# Patient Record
Sex: FEMALE | Race: WHITE | NOT HISPANIC OR LATINO | Employment: FULL TIME | URBAN - METROPOLITAN AREA
[De-identification: names, ages, dates, MRNs, and addresses within clinical notes are randomized per-mention and may not be internally consistent; named-entity substitution may affect disease eponyms.]

---

## 2018-03-08 ENCOUNTER — TRANSCRIBE ORDERS (OUTPATIENT)
Dept: GASTROENTEROLOGY | Facility: CLINIC | Age: 58
End: 2018-03-08

## 2018-03-14 ENCOUNTER — TELEPHONE (OUTPATIENT)
Dept: GASTROENTEROLOGY | Facility: CLINIC | Age: 58
End: 2018-03-14

## 2018-03-15 ENCOUNTER — TELEPHONE (OUTPATIENT)
Dept: GASTROENTEROLOGY | Facility: CLINIC | Age: 58
End: 2018-03-15

## 2020-12-10 LAB — HCV AB SER-ACNC: NEGATIVE

## 2021-05-11 ENCOUNTER — APPOINTMENT (OUTPATIENT)
Dept: URGENT CARE | Facility: MEDICAL CENTER | Age: 61
End: 2021-05-11

## 2021-05-11 ENCOUNTER — TRANSCRIBE ORDERS (OUTPATIENT)
Dept: URGENT CARE | Facility: MEDICAL CENTER | Age: 61
End: 2021-05-11

## 2021-05-11 DIAGNOSIS — Z02.1 PHYSICAL EXAM, PRE-EMPLOYMENT: Primary | ICD-10-CM

## 2021-05-11 PROCEDURE — 86480 TB TEST CELL IMMUN MEASURE: CPT | Performed by: PHYSICIAN ASSISTANT

## 2021-05-13 LAB
GAMMA INTERFERON BACKGROUND BLD IA-ACNC: 0.03 IU/ML
M TB IFN-G BLD-IMP: NEGATIVE
M TB IFN-G CD4+ BCKGRND COR BLD-ACNC: 0 IU/ML
M TB IFN-G CD4+ BCKGRND COR BLD-ACNC: 0 IU/ML
MITOGEN IGNF BCKGRD COR BLD-ACNC: >10 IU/ML

## 2021-06-18 ENCOUNTER — APPOINTMENT (OUTPATIENT)
Dept: LAB | Facility: HOSPITAL | Age: 61
End: 2021-06-18

## 2021-06-18 DIAGNOSIS — Z01.84 IMMUNITY STATUS TESTING: ICD-10-CM

## 2021-06-18 PROCEDURE — 86787 VARICELLA-ZOSTER ANTIBODY: CPT

## 2021-06-18 PROCEDURE — 36415 COLL VENOUS BLD VENIPUNCTURE: CPT

## 2021-06-22 LAB — VZV IGG SER IA-ACNC: NORMAL

## 2021-11-10 ENCOUNTER — IMMUNIZATIONS (OUTPATIENT)
Dept: FAMILY MEDICINE CLINIC | Facility: HOSPITAL | Age: 61
End: 2021-11-10

## 2021-11-10 DIAGNOSIS — Z23 ENCOUNTER FOR IMMUNIZATION: Primary | ICD-10-CM

## 2021-11-10 PROCEDURE — 0013A COVID-19 MODERNA VACC 0.25 ML BOOSTER: CPT

## 2021-11-10 PROCEDURE — 91306 COVID-19 MODERNA VACC 0.25 ML BOOSTER: CPT

## 2022-03-09 ENCOUNTER — EVALUATION (OUTPATIENT)
Dept: PHYSICAL THERAPY | Facility: CLINIC | Age: 62
End: 2022-03-09
Payer: COMMERCIAL

## 2022-03-09 DIAGNOSIS — M54.12 CERVICAL RADICULOPATHY: Primary | ICD-10-CM

## 2022-03-09 PROCEDURE — 97162 PT EVAL MOD COMPLEX 30 MIN: CPT | Performed by: PHYSICAL THERAPIST

## 2022-03-09 PROCEDURE — 97110 THERAPEUTIC EXERCISES: CPT | Performed by: PHYSICAL THERAPIST

## 2022-03-09 NOTE — PROGRESS NOTES
PT Evaluation     Today's date: 3/9/2022  Patient name: Roverto Medrano  : 1960  MRN: [id number]  Referring provider: No ref  provider found  Dx:   Encounter Diagnosis     ICD-10-CM    1  Cervical radiculopathy  M54 12                   Assessment  Assessment details: Patient presents with signs and symptoms consistent with cervical radiculopathy  A Love cervical mechanical exam was performed  Shoulder pain and weaknes was resolved with repeated retraction/extension  Positive prognostic indicators include: Motivated to improve, directional preference found (retrction/extension)  Negative prognostic indicators include: acute on chronic presentation  She presents with: pain, decreased: ROM, strength and  functional capacity  She requires skilled PT to address these deficits and restore maximal functional capacity  Thank you for this pleasant referral        Impairments: abnormal or restricted ROM, activity intolerance, impaired physical strength, lacks appropriate home exercise program and pain with function  Understanding of Dx/Px/POC: good   Prognosis: good    Goals  ST-6 weeks  1  Patient to be independent with HEP  2   Decrease pain at least 2 subjective levels  LT-12 weeks  1    Patient to voice comfort with self management of condition  2   75% or > decreased pain  3   75% or > decreased functional deficits  4   Normalize AROM of all deficit planes  5   Normalize strength  7   Patient to voice understanding of activities/positions to avoid  8   Centralize symptoms      Plan  Patient would benefit from: skilled PT  Referral necessary: No  Planned modality interventions: cryotherapy  Planned therapy interventions: IADL retraining, joint mobilization, manual therapy, motor coordination training, neuromuscular re-education, patient education, postural training, self care, strengthening, stretching, therapeutic activities, therapeutic exercise, home exercise program, flexibility, ADL training, balance and body mechanics training  Frequency: 2x week  Duration in weeks: 6  Treatment plan discussed with: patient        Subjective Evaluation    History of Present Illness  Onset date: Many years, new episode since February  Mechanism of injury: Chief Complaint: R shoulder/upper arm pain    History:  Patient has an episodic history of shoulder/upper arm pain that has been worse when she attempted to return to swimming in February  Symptoms have been improved since onset  She had x-rays of shoulder  Treatment this far has consisted of meds  Currently she is not swimming  She works as a Director of Nursing  PMH: neck/back pain, HTN,      Aggravating factors: Sleeping, reaching (car radio, grab phone), donning sports bra    Relieving factors:   Ice/rest, sometimes head, ibuprofin, tennis ball massage    Functional Deficits: Comforter on bed, reaching, swimming    Patient Goals: 20 min swim race in May, swim for fitness    Quality of life: good    Pain  At best pain ratin  At worst pain ratin  Location: R upper arm          Objective     Postural Observations  Seated posture: poor  Correction of posture: has no consistent effect      Mechanical Assessment    Cervical    Seated retraction: repeated movements   Pain intensity: better  Pain level: decreased  Seated Extension: repeated movements  Pain intensity: better  Pain level: abolished    Thoracic      Lumbar      General Comments:      Cervical/Thoracic Comments  Shoulder AROM WFL  Shoulder MMT 4-/5, painful flexion (resolved post cx interventions- retraction/retratctoin pt OP, retraction with extension)  (-) TTP   (-) Impingement  Shoulder pain resolved with retraction/retraction pt OP/retraction-extension seated      Flowsheet Rows      Most Recent Value   PT/OT G-Codes    Current Score 61   Projected Score 73             Precautions: (-)      Manuals             Retraction Mob             Retraction with Ext with Distraction                                       Neuro Re-Ed             Posture Ed             "Neck Acct"             Slouch overcorrect                                                                 Ther Ex             HEP 10'            Seated Ret             Seated Ret pt OP             Seated Ret Ext             Supine Ret/Ext             Scap Squeezes                                       Ther Activity                                       Gait Training                                       Modalities

## 2022-03-15 ENCOUNTER — OFFICE VISIT (OUTPATIENT)
Dept: PHYSICAL THERAPY | Facility: CLINIC | Age: 62
End: 2022-03-15
Payer: COMMERCIAL

## 2022-03-15 DIAGNOSIS — M54.12 CERVICAL RADICULOPATHY: Primary | ICD-10-CM

## 2022-03-15 PROCEDURE — 97110 THERAPEUTIC EXERCISES: CPT | Performed by: PHYSICAL THERAPIST

## 2022-03-15 PROCEDURE — 97112 NEUROMUSCULAR REEDUCATION: CPT | Performed by: PHYSICAL THERAPIST

## 2022-03-15 NOTE — PROGRESS NOTES
Daily Note     Today's date: 3/15/2022  Patient name: Arsen Mcconnell  : 1960  MRN: [id number]  Referring provider: John Jonas MD  Dx:   Encounter Diagnosis     ICD-10-CM    1  Cervical radiculopathy  M54 12                   Subjective: Significant pain Saturday, unsure why  Generally a bit less pain, has been compliant with HEP  Objective: See treatment diary below  Min pain at rest, painful resisted shoulder flexion pre tx  Post tx: pain free at rest and painless/strong shl flexion MMT  Pt ed in prevention/reductive exercises 1 set every 2 hours (minimum)  Voiced understanding      Assessment: Tolerated treatment well  Patient had resolved posterior derangement  Plan: Continue per plan of care        Precautions: (-)      Manuals  3/15           Retraction Mob             Retraction with Ext with Distraction                                       Neuro Re-Ed             Posture Ed  C           "Neck Acct"  C           Slouch overcorrect             Posture Lx Roll  C                                                  Ther Ex             HEP 10'            Seated Ret  20           Seated Ret pt OP  20           Seated Ret Ext  20           Supine Ret/Ext             Scap Squeezes             Seated Ret with Ext with pt OP (towel return)  20                        Ther Activity                                       Gait Training                                       Modalities

## 2022-03-17 ENCOUNTER — OFFICE VISIT (OUTPATIENT)
Dept: PHYSICAL THERAPY | Facility: CLINIC | Age: 62
End: 2022-03-17
Payer: COMMERCIAL

## 2022-03-17 DIAGNOSIS — M54.12 CERVICAL RADICULOPATHY: Primary | ICD-10-CM

## 2022-03-17 PROCEDURE — 97110 THERAPEUTIC EXERCISES: CPT | Performed by: PHYSICAL THERAPIST

## 2022-03-17 PROCEDURE — 97140 MANUAL THERAPY 1/> REGIONS: CPT | Performed by: PHYSICAL THERAPIST

## 2022-03-17 NOTE — PROGRESS NOTES
Daily Note     Today's date: 3/17/2022  Patient name: Berta Whiteside  : 1960  MRN: [id number]  Referring provider: Checo Hightower MD  Dx:   Encounter Diagnosis     ICD-10-CM    1  Cervical radiculopathy  M54 12                   Subjective: Felt good 1 5 days since LV but awoke today with significant pain, feels "something pinching" in shoudler      Objective: See treatment diary below  Baselines: Painful abduction/flexoin AROM and MMT R Shoulder flexion       Assessment: Tolerated treatment well  Patient would benefit from continued PT  Pain and baseline values virtually resolved post tx      Plan: Continue per plan of care        Precautions: (-)      Manuals  3/15 3/17          Retraction Mob             Retraction with Ext with Distraction   10'                                    Neuro Re-Ed             Posture Ed  C           "Neck Acct"  C           Slouch overcorrect             Posture Lx Roll  C                                                  Ther Ex             HEP 10'            Seated Ret  20 20          Seated Ret pt OP  20 20          Seated Ret Ext  20 20          Supine Ret/Ext   20          Scap Squeezes             Seated Ret with Ext with pt OP (towel return)  20 20                       Ther Activity                                       Gait Training                                       Modalities

## 2022-03-18 ENCOUNTER — APPOINTMENT (OUTPATIENT)
Dept: PHYSICAL THERAPY | Facility: CLINIC | Age: 62
End: 2022-03-18
Payer: COMMERCIAL

## 2022-03-22 ENCOUNTER — OFFICE VISIT (OUTPATIENT)
Dept: PHYSICAL THERAPY | Facility: CLINIC | Age: 62
End: 2022-03-22
Payer: COMMERCIAL

## 2022-03-22 DIAGNOSIS — M54.12 CERVICAL RADICULOPATHY: Primary | ICD-10-CM

## 2022-03-22 PROCEDURE — 97140 MANUAL THERAPY 1/> REGIONS: CPT | Performed by: PHYSICAL THERAPIST

## 2022-03-22 PROCEDURE — 97110 THERAPEUTIC EXERCISES: CPT | Performed by: PHYSICAL THERAPIST

## 2022-03-22 NOTE — PROGRESS NOTES
Daily Note     Today's date: 3/22/2022  Patient name: Kayleigh Goodwin  : 1960  MRN: [id number]  Referring provider: Fabio Velázquez MD  Dx:   Encounter Diagnosis     ICD-10-CM    1  Cervical radiculopathy  M54 12                   Subjective: Pt having more shoulder pain and LBP through the weekend  Exercises help but do not fully resolve pain with HEP      Objective: See treatment diary below  Baseline: Pain with active abduction, supine "scooting" with R UE   Pt ed in HEP frequency and supine retraction/extension vs just seated and doing exercises until pain resolves  Assessment: Tolerated treatment well  Patient had fully resolved sympotms post tx  Req'd aggressive/repeated end range extension procedures to clear       Plan: Continue per plan of care     IE for LBP NV     Precautions: (-)      Manuals  3/15 3/17 3/22         Retraction Mob             Retraction with Ext with Distraction   10' 5         Prone Lower cx PA mobs    Gr 2-4 5'                      Neuro Re-Ed             Posture Ed  C  C         "Neck Acct"  C           Slouch overcorrect             Posture Lx Roll  C                                                  Ther Ex             HEP 10'            Seated Ret  20 20 20         Seated Ret pt OP  20 20 20         Seated Ret Ext  20 20 20         Supine Ret/Ext   20 20         Scap Squeezes             Seated Ret with Ext with pt OP (towel return)  20 20 29                      Ther Activity                                       Gait Training                                       Modalities

## 2022-03-24 ENCOUNTER — OFFICE VISIT (OUTPATIENT)
Dept: PHYSICAL THERAPY | Facility: CLINIC | Age: 62
End: 2022-03-24
Payer: COMMERCIAL

## 2022-03-24 DIAGNOSIS — M54.16 LUMBAR RADICULOPATHY: Primary | ICD-10-CM

## 2022-03-24 PROCEDURE — 97110 THERAPEUTIC EXERCISES: CPT | Performed by: PHYSICAL THERAPIST

## 2022-03-24 PROCEDURE — 97161 PT EVAL LOW COMPLEX 20 MIN: CPT | Performed by: PHYSICAL THERAPIST

## 2022-03-24 NOTE — PROGRESS NOTES
PT Evaluation     Today's date: 3/24/2022  Patient name: Peace Parker  : 1960  MRN: [id number]  Referring provider: Mike Mcleod MD  Dx:   Encounter Diagnosis     ICD-10-CM    1  Lumbar radiculopathy  M54 16                   Assessment  Assessment details: Patient presents with signs and symptoms consistent with mechanical lumbar pain/radiculopathy  A Love cervical mechanical exam was performed with symptoms consistent with a posterior derangement with a treatment principle of lumbar extension  Symptoms were resolved with repeated lumbar extension and extension mobilizations  She would benefit from PT including lumbar spine as well as continuing treatment for her shoulder/neck  Thank you for this pleasant referral        Impairments: abnormal or restricted ROM, activity intolerance, impaired physical strength, lacks appropriate home exercise program and pain with function  Understanding of Dx/Px/POC: good   Prognosis: good    Goals  ST-6 weeks  1  Patient to be independent with HEP  2   Decrease pain at least 2 subjective levels  LT-12 weeks  1    Patient to voice comfort with self management of condition  2   75% or > decreased pain  3   75% or > decreased functional deficits  4   Normalize AROM of all deficit planes  5   Normalize strength  7   Patient to voice understanding of activities/positions to avoid  8   Centralize symptoms      Plan  Patient would benefit from: skilled PT  Referral necessary: No  Planned modality interventions: cryotherapy  Planned therapy interventions: IADL retraining, joint mobilization, manual therapy, motor coordination training, neuromuscular re-education, patient education, postural training, self care, strengthening, stretching, therapeutic activities, therapeutic exercise, home exercise program, flexibility, ADL training, balance and body mechanics training  Frequency: 2x week  Duration in weeks: 6  Treatment plan discussed with: patient        Subjective Evaluation    History of Present Illness  Onset date: a few months ago  Mechanism of injury: Chief Complaint: back, R buttock, R LE paresthesias    History:  Patient has a history of LBP and R LE pain for many years including having a L5 Laminectomy a few years ago   She notes increased pain with starting her new job with a long car ride  Symptoms are worse with prolonged standing and rising after sitting and asa the day progresses     Symptoms are eased with stretching (cobra)       PMH: neck/back pain, HTN,       Functional Deficits: Disturbed sleeping, swimming, prolonged sitting    Patient Goals: be able to hike and walk a few miles    Quality of life: good    Pain  At best pain ratin  At worst pain ratin  Location: Back R buttock          Objective     Postural Observations  Seated posture: poor  Correction of posture: makes symptoms better      Mechanical Assessment    Cervical    Seated retraction: repeated movements   Pain intensity: better  Pain level: decreased  Seated Extension: repeated movements  Pain intensity: better  Pain level: abolished    Thoracic      Lumbar      General Comments:      Lumbar Comments  Extension: Min [place]  Remainder AROM WFL  Neuromotor Exam WFL  Holding 15 pound box increased pain    Mechanical Assessment: ADAN/ADAN- abolished pain, Better    Cervical/Thoracic Comments  CERVICAL MEASURES NOT RETESTED TODAY      Shoulder AROM WFL  Shoulder MMT 4-/5, painful flexion (resolved post cx interventions- retraction/retratctoin pt OP, retraction with extension)  (-) TTP   (-) Impingement  Shoulder pain resolved with retraction/retraction pt OP/retraction-extension seated             Precautions: (-)  Manuals  3/15 3/17 3/22 3/24        Retraction Mob             Retraction with Ext with Distraction   10' 5         Prone Lower cx PA mobs    Gr 2-4 5' C                     Neuro Re-Ed             Posture Ed  C  C         "Neck Acct"  C           Slouch overcorrect             Posture Lx Roll  C                                                  Ther Ex             HEP 10'    Lx 10'        Seated Ret  20 20 20         Seated Ret pt OP  20 20 20         Seated Ret Ext  20 20 20         Supine Ret/Ext   20 20         Scap Squeezes             Seated Ret with Ext with pt OP (towel return)  20 20 29                      EIS             EIL             EIS belt OP             EIL pt OP             Ther Activity                                       Gait Training                                       Modalities

## 2022-03-25 ENCOUNTER — APPOINTMENT (OUTPATIENT)
Dept: PHYSICAL THERAPY | Facility: CLINIC | Age: 62
End: 2022-03-25
Payer: COMMERCIAL

## 2022-03-29 ENCOUNTER — OFFICE VISIT (OUTPATIENT)
Dept: PHYSICAL THERAPY | Facility: CLINIC | Age: 62
End: 2022-03-29
Payer: COMMERCIAL

## 2022-03-29 DIAGNOSIS — M54.16 LUMBAR RADICULOPATHY: Primary | ICD-10-CM

## 2022-03-29 DIAGNOSIS — M54.12 CERVICAL RADICULOPATHY: ICD-10-CM

## 2022-03-29 PROCEDURE — 97110 THERAPEUTIC EXERCISES: CPT | Performed by: PHYSICAL THERAPIST

## 2022-03-29 PROCEDURE — 97140 MANUAL THERAPY 1/> REGIONS: CPT | Performed by: PHYSICAL THERAPIST

## 2022-03-29 NOTE — PROGRESS NOTES
Daily Note     Today's date: 3/29/2022  Patient name: Meredith Brian  : 1960  MRN: [id number]  Referring provider: Maite Winn MD  Dx:   Encounter Diagnosis     ICD-10-CM    1  Lumbar radiculopathy  M54 16    2  Cervical radiculopathy  M54 12                   Subjective: Using lumbar roll and initiated use of cx roll with sleeping which made significant relief      Objective: See treatment diary below      Assessment: Tolerated treatment well  Patient continues to repond to extension based interventions for neck/back    Maximally motivated/compliant with HEP      Plan: assess reponse to swimmign if patient able to try before NV     Precautions: (-)  Manuals  3/15 3/17 3/22 3/24 3/29       Retraction Mob             Retraction with Ext with Distraction   10' 5  5       Prone Lower cx PA mobs    Gr 2-4 5' C 5                    Neuro Re-Ed             Posture Ed  C  C         "Neck Acct"  C           Slouch overcorrect             Posture Lx Roll  C                                                  Ther Ex             HEP 10'    Lx 10'        Seated Ret  20 20 20  20       Seated Ret pt OP  20 20 20  20       Seated Ret Ext  20 20 20  20       Supine Ret/Ext   20 20  20       Scap Squeezes             Seated Ret with Ext with pt OP (towel return)  20 20 29                      EIS      20       EIL      20       EIS belt OP      20       EIL pt OP             Ther Activity                                       Gait Training                                       Modalities

## 2022-03-31 ENCOUNTER — APPOINTMENT (OUTPATIENT)
Dept: PHYSICAL THERAPY | Facility: CLINIC | Age: 62
End: 2022-03-31
Payer: COMMERCIAL

## 2022-04-01 ENCOUNTER — APPOINTMENT (OUTPATIENT)
Dept: PHYSICAL THERAPY | Facility: CLINIC | Age: 62
End: 2022-04-01
Payer: COMMERCIAL

## 2022-04-05 ENCOUNTER — OFFICE VISIT (OUTPATIENT)
Dept: PHYSICAL THERAPY | Facility: CLINIC | Age: 62
End: 2022-04-05
Payer: COMMERCIAL

## 2022-04-05 DIAGNOSIS — M54.16 LUMBAR RADICULOPATHY: ICD-10-CM

## 2022-04-05 DIAGNOSIS — M54.12 CERVICAL RADICULOPATHY: Primary | ICD-10-CM

## 2022-04-05 PROCEDURE — 97110 THERAPEUTIC EXERCISES: CPT | Performed by: PHYSICAL THERAPIST

## 2022-04-05 PROCEDURE — 97140 MANUAL THERAPY 1/> REGIONS: CPT | Performed by: PHYSICAL THERAPIST

## 2022-04-05 NOTE — PROGRESS NOTES
Daily Note     Today's date: 2022  Patient name: Radha Lala  : 1960  MRN: [id number]  Referring provider: Claire Crespo MD  Dx:   Encounter Diagnosis     ICD-10-CM    1  Cervical radiculopathy  M54 12    2  Lumbar radiculopathy  M54 16                   Subjective: continues to have some LBP and R Ut/Shoulder pain  Was able to swim without increase in pain  Compliant with HEP  Going to [place] for the weekend  Objective: See treatment diary below  Reviewed preventative exercises and "back account" especially given prolonged driving this weekend   Per pt request shown strengthening exercises as noted for UEs      Assessment: Tolerated treatment well  Patient would benefit from continued PT      Plan: Continue per plan of care        Precautions: (-)  Manuals  3/15 3/17 3/22 3/24 3/29 4      Retraction Mob             Retraction with Ext with Distraction   10' 5  5 5      Prone Lower cx PA mobs    Gr 2-4 5' C 5 5                   Neuro Re-Ed             Posture Ed  C  C         "Neck Acct"  C           Slouch overcorrect             Posture Lx Roll  C                                                  Ther Ex             HEP 10'    Lx 10'        Seated Ret  20 20 20  20 20      Seated Ret pt OP  20 20 20  20 20      Seated Ret Ext  20 20 20  20 20      Supine Ret/Ext   20 20  20 20      Scap Squeezes             Seated Ret with Ext with pt OP (towel return)  20 20 29   20      TB Rows       G :03 20      TB Ext       G :03 20                   EIS      20 20      EIL      20       EIS belt OP      20 20      EIL pt OP             Ther Activity                                       Gait Training                                       Modalities

## 2022-04-08 ENCOUNTER — APPOINTMENT (OUTPATIENT)
Dept: PHYSICAL THERAPY | Facility: CLINIC | Age: 62
End: 2022-04-08
Payer: COMMERCIAL

## 2022-04-12 ENCOUNTER — EVALUATION (OUTPATIENT)
Dept: PHYSICAL THERAPY | Facility: CLINIC | Age: 62
End: 2022-04-12
Payer: COMMERCIAL

## 2022-04-12 DIAGNOSIS — M54.12 CERVICAL RADICULOPATHY: ICD-10-CM

## 2022-04-12 DIAGNOSIS — M54.16 LUMBAR RADICULOPATHY: Primary | ICD-10-CM

## 2022-04-12 PROCEDURE — 97140 MANUAL THERAPY 1/> REGIONS: CPT | Performed by: PHYSICAL THERAPIST

## 2022-04-12 PROCEDURE — 97110 THERAPEUTIC EXERCISES: CPT | Performed by: PHYSICAL THERAPIST

## 2022-04-12 NOTE — PROGRESS NOTES
PT ReEvaluation and Discharge    Today's date: 2022  Patient name: Hossein Garg  : 1960  MRN: [id number]  Referring provider: Rosa M Staton MD  Dx:   Encounter Diagnosis     ICD-10-CM    1  Lumbar radiculopathy  M54 16    2  Cervical radiculopathy  M54 12                   Assessment  Assessment details: Patient has some remaining pain but is compliant with HEP and feels ready to self DC  She will return to therapy only as needed  Thank you for this pleasant referral     Impairments: activity intolerance and pain with function  Understanding of Dx/Px/POC: good   Prognosis: good    Goals  ST-6 weeks  1  Patient to be independent with HEP - Met  2  Decrease pain at least 2 subjective levels  - Met    LT-12 weeks  1    Patient to voice comfort with self management of condition - Met  2   75% or > decreased pain  - Met  3   75% or > decreased functional deficits  - Met  4  Normalize AROM of all deficit planes  - Met  5  Normalize strength  - Met  7  Patient to voice understanding of activities/positions to avoid  - Met  8  Centralize symptoms - Met    Plan  Patient would benefit from: skilled PT  Referral necessary: No  Planned modality interventions: cryotherapy  Planned therapy interventions: IADL retraining, joint mobilization, manual therapy, motor coordination training, neuromuscular re-education, patient education, postural training, self care, strengthening, stretching, therapeutic activities, therapeutic exercise, home exercise program, flexibility, ADL training, balance and body mechanics training  Treatment plan discussed with: patient        Subjective Evaluation    History of Present Illness  Onset date: Many years, new episode since February  Mechanism of injury: Patient feels 90% improvement or better since beginning therapy   She has returned to swimming  She still has pain but is independent and feels ready for DC to HEP        Patient Goals: 20 min swim race in May, swim for fitness    Quality of life: good    Pain  At best pain ratin  Location: R upper arm          Objective     Postural Observations  Seated posture: poor  Correction of posture: has no consistent effect      Mechanical Assessment    Cervical    Seated retraction: repeated movements   Pain intensity: better  Pain level: abolished  Seated Extension: repeated movements  Pain intensity: better  Pain level: abolished    Thoracic      Lumbar      General Comments:      Cervical/Thoracic Comments  Shoulder AROM WFL  Shoulder MMT 4-/5, painful flexion (resolved post cx interventions- retraction/retratctoin pt OP, retraction with extension)  (-) TTP   (-) Impingement  Shoulder pain resolved with retraction/retraction pt OP/retraction-extension seated    Lumbar AROM WFL- symptoms resolve with repeated lumbar extension      Flowsheet Rows      Most Recent Value   PT/OT G-Codes    Current Score 67   Projected Score 60             Precautions: (-)         Manuals  3/15 3/17 3/22 3/24 3/29 4/5 4/12     Retraction Mob             Retraction with Ext with Distraction   10' 5  5 5 10     Prone Lower cx PA mobs    Gr 2-4 5' C 5 5 5                  Neuro Re-Ed             Posture Ed  C  C         "Neck Acct"  C           Slouch overcorrect             Posture Lx Roll  C                                                  Ther Ex             HEP 10'    Lx 10'        Seated Ret  20 20 20  20 20 20     Seated Ret pt OP  20 20 20  20 20 20     Seated Ret Ext  20 20 20  20 20 20     Supine Ret/Ext   20 20  20 20 20     Scap Squeezes             Seated Ret with Ext with pt OP (towel return)  20 20 29   20 20     TB Rows       G :03 20      TB Ext       G :03 20                   EIS      20 20      EIL      20       EIS belt OP      20 20      EIL pt OP             Ther Activity                                       Gait Training                                       Modalities

## 2022-04-15 ENCOUNTER — APPOINTMENT (OUTPATIENT)
Dept: PHYSICAL THERAPY | Facility: CLINIC | Age: 62
End: 2022-04-15
Payer: COMMERCIAL

## 2022-06-20 ENCOUNTER — TELEPHONE (OUTPATIENT)
Dept: PAIN MEDICINE | Facility: CLINIC | Age: 62
End: 2022-06-20

## 2022-06-20 ENCOUNTER — TRANSCRIBE ORDERS (OUTPATIENT)
Dept: PAIN MEDICINE | Facility: CLINIC | Age: 62
End: 2022-06-20

## 2022-07-07 ENCOUNTER — TELEPHONE (OUTPATIENT)
Dept: PAIN MEDICINE | Facility: CLINIC | Age: 62
End: 2022-07-07

## 2022-07-07 NOTE — TELEPHONE ENCOUNTER
Ambulance Spoke with the [facility] and they put the records in the mail to us, unfortunately as of 7/7 we did not receive them  Will have them resend them next week if we do not receive the records  EMS

## 2022-07-27 ENCOUNTER — OFFICE VISIT (OUTPATIENT)
Dept: PAIN MEDICINE | Facility: CLINIC | Age: 62
End: 2022-07-27
Payer: COMMERCIAL

## 2022-07-27 ENCOUNTER — TRANSCRIBE ORDERS (OUTPATIENT)
Dept: PAIN MEDICINE | Facility: CLINIC | Age: 62
End: 2022-07-27

## 2022-07-27 VITALS — SYSTOLIC BLOOD PRESSURE: 142 MMHG | WEIGHT: 175 LBS | HEART RATE: 77 BPM | DIASTOLIC BLOOD PRESSURE: 80 MMHG

## 2022-07-27 DIAGNOSIS — M79.18 MYOFASCIAL PAIN SYNDROME: ICD-10-CM

## 2022-07-27 DIAGNOSIS — M50.120 CERVICAL DISC DISORDER WITH RADICULOPATHY OF MID-CERVICAL REGION: ICD-10-CM

## 2022-07-27 DIAGNOSIS — M47.812 CERVICAL SPONDYLOSIS: ICD-10-CM

## 2022-07-27 DIAGNOSIS — M48.062 LUMBAR STENOSIS WITH NEUROGENIC CLAUDICATION: Primary | ICD-10-CM

## 2022-07-27 DIAGNOSIS — M47.816 LUMBAR SPONDYLOSIS: ICD-10-CM

## 2022-07-27 PROCEDURE — 99244 OFF/OP CNSLTJ NEW/EST MOD 40: CPT | Performed by: ANESTHESIOLOGY

## 2022-07-27 RX ORDER — ATORVASTATIN CALCIUM 10 MG/1
10 TABLET, FILM COATED ORAL
COMMUNITY
Start: 2022-02-28 | End: 2022-09-09 | Stop reason: SDUPTHER

## 2022-07-27 RX ORDER — LOSARTAN POTASSIUM 50 MG/1
50 TABLET ORAL DAILY
COMMUNITY
Start: 2021-10-28 | End: 2022-09-13 | Stop reason: SDUPTHER

## 2022-07-27 RX ORDER — TIZANIDINE 4 MG/1
4 TABLET ORAL
Qty: 30 TABLET | Refills: 1 | Status: SHIPPED | OUTPATIENT
Start: 2022-07-27 | End: 2022-08-04

## 2022-07-27 RX ORDER — TRAZODONE HYDROCHLORIDE 100 MG/1
50 TABLET ORAL
COMMUNITY
Start: 2022-05-12 | End: 2022-09-13 | Stop reason: SDUPTHER

## 2022-07-27 NOTE — PROGRESS NOTES
Assessment  1  Lumbar stenosis with neurogenic claudication    2  Lumbar spondylosis    3  Myofascial pain syndrome    4  Cervical disc disorder with radiculopathy of mid-cervical region    5  Cervical spondylosis        Plan  The patient's symptoms, history/physical are consistent with pain that is multifactorial in origin but predominantly result of her spinal stenosis at L4-5 which is causing her chronic lower back and leg symptoms  At this time, I will order an EMG of the bilateral lower extremities to assess for any active versus chronic radiculopathy  I have also asked her to obtain the MRI CD of the images for me to review  She also has underlying cervical spondylosis which is causing her chronic neck pain as well as significant spasm which causes her chronic discomfort so I will start her on tizanidine 4 mg at bedtime  She was apprised of the most common side effects including sleepiness and dizziness  My impressions and treatment recommendations were discussed in detail with the patient who verbalized understanding and had no further questions  Discharge instructions were provided  I personally saw and examined the patient and I agree with the above discussed plan of care  Orders Placed This Encounter   Procedures    EMG 2 limb lower extremity     Standing Status:   Future     Standing Expiration Date:   7/27/2023     Scheduling Instructions:      Pain and numbness into both legs     Order Specific Question:   Possible Diagnosis: Answer:   lumbar radiculopathy     New Medications Ordered This Visit   Medications    atorvastatin (LIPITOR) 10 mg tablet     Sig: Take 10 mg by mouth daily    losartan (COZAAR) 50 mg tablet     Sig: Take 50 mg by mouth daily    traZODone (DESYREL) 100 mg tablet     Sig: Take 100 mg by mouth if needed Patient states that she takes half a pill as needed at bedtime      tiZANidine (ZANAFLEX) 4 mg tablet     Sig: Take 1 tablet (4 mg total) by mouth daily at bedtime     Dispense:  30 tablet     Refill:  1       History of Present Illness    Rahat Anguiano is a 57 y o  female referred by Dr Janis Orta for chronic lower back and neck pain  Symptoms have been present for 10 years  Pain is moderate at times rated 6/10 on numeric rating scale and felt intermittently worse at night described to be dull aching cramping and burning  Pain starts in the lower back and travels into both legs  She also experiences pain in the neck that travels into the shoulders  Symptoms are aggravated sitting, walking  Treatment history has included prior epidurals which have been moderately helpful/1 was in June 2021  Prior back surgery in 2011 provided excellent relief  Chiropractic manipulation provides moderate relief  She uses ibuprofen as needed which is helpful  Diazepam in the past has been helpful for neck spasms  I have personally reviewed and/or updated the patient's past medical history, past surgical history, family history, social history, current medications, allergies, and vital signs today  Review of Systems   Constitutional: Negative for fever and unexpected weight change  HENT: Negative for trouble swallowing  Eyes: Negative for visual disturbance  Respiratory: Negative for shortness of breath and wheezing  Cardiovascular: Negative for chest pain and palpitations  Gastrointestinal: Negative for constipation, diarrhea, nausea and vomiting  Endocrine: Negative for cold intolerance, heat intolerance and polydipsia  Genitourinary: Negative for difficulty urinating and frequency  Musculoskeletal: Positive for back pain, gait problem and joint swelling  Negative for arthralgias and myalgias  Skin: Negative for rash  Neurological: Negative for dizziness, seizures, syncope, weakness and headaches  Hematological: Does not bruise/bleed easily  Psychiatric/Behavioral: Negative for dysphoric mood     All other systems reviewed and are negative  There is no problem list on file for this patient  Past Medical History:   Diagnosis Date    High cholesterol     Hypertension        History reviewed  No pertinent surgical history  History reviewed  No pertinent family history  Social History     Occupational History    Not on file   Tobacco Use    Smoking status: Not on file    Smokeless tobacco: Not on file   Substance and Sexual Activity    Alcohol use: Not on file    Drug use: Not on file    Sexual activity: Not on file       Current Outpatient Medications on File Prior to Visit   Medication Sig    atorvastatin (LIPITOR) 10 mg tablet Take 10 mg by mouth daily    losartan (COZAAR) 50 mg tablet Take 50 mg by mouth daily    traZODone (DESYREL) 100 mg tablet Take 100 mg by mouth if needed Patient states that she takes half a pill as needed at bedtime  No current facility-administered medications on file prior to visit  Not on File    Physical Exam    /80   Pulse 77   Wt 79 4 kg (175 lb)     Constitutional: normal, well developed, well nourished, alert, in no distress and non-toxic and no overt pain behavior  Eyes: anicteric  HEENT: grossly intact  Neck: supple, symmetric, trachea midline and no masses   Pulmonary:even and unlabored  Cardiovascular:No edema or pitting edema present  Skin:Normal without rashes or lesions and well hydrated  Psychiatric:Mood and affect appropriate  Neurologic:Cranial Nerves II-XII grossly intact  Musculoskeletal:Examination of the cervical and lumbar spine reveals full strength 5/5 in the bilateral upper extremity and lower extremity flexors/extensors with 2+ reflexes in the bilateral biceps, triceps, patellar, Achilles; full range of motion    Imaging    MRI Lumbar Spine (5/20/2021)  History: Lower back pain radiating to both legs without lumbar stenosis  Technique: Noncontrast MRI of the lumbar spine  Sagittal T1 and STIR, sagittal   and axial T2       Comparison: MRI of 6/27/2018  Findings: For the purposes of this dictation, the most inferior disc will be   designated L5-S1  Conus and lower thoracic cord: Both normal  Conus medullaris located at L1  Marrow and Alignment: No marrow replacing process  Alteration of the marrow   signal intensity at the L4-5 and L5-S1 endplates secondary to degenerative   change, present on the prior MRI  Normal alignment  L1-L2: Minimal annular bulge  3 mm perineural cyst in the left neural foramen  No change  L2-L3 : Minimal annular bulge  No change  L3-L4: Minimal loss of disc height  Annular bulging  No change  L4-L5: Further loss of disc height since the prior study  Resolution of the   previous right-sided disc protrusion  Diffuse annular bulge  Increased   hypertrophy of the ligamenta flava  Degenerated facet joints  Moderate to severe   central canal stenosis, unchanged  Increase in the size of the left   foraminal/extraforaminal disc protrusion  Mass effect upon the exiting left L4   nerve root, not present on the prior MRI  Moderate to severe left foraminal   stenosis, increased  L5-S1: Status post left hemilaminectomy  Patent central canal  No arachnoiditis  Annular bulge  Mild increase in the size of the small central disc protrusion   which indents the anterior surface of the thecal sac  Hypertrophy of the right   ligamentum flavum  Small osteophytes  Moderate to severe right and mild left   neural foraminal stenosis, unchanged  Paraspinal soft tissues: Unremarkable  MRI Cervical Spine (10/29/2021)  History: Cervicalgia     Procedure: MRI of the cervical spine without contrast dated 10/29/2021     Technique: MRI of the cervical spine was performed without contrast     Contrast: None     Comparison: MRI cervical spine dated 8/13/2015     Findings:     Alignment: There is normal cervical lordosis  The vertebral bodies are normal in   height and alignment       Bone Marrow: Multilevel degenerative endplate changes  No focal suspicious   marrow signal abnormality  CSF/Spinal Cord: The spinal cord is normal in caliber and signal      Visualized Posterior Fossa/Foramen Magnum: The cerebellar tonsils are normal in   location  Moderate disc space narrowing at C5-C6, progressed  Mild disc space narrowing at   C6-C7, progressed  Mild multilevel anterior endplate osteophyte formation  Levels:   C2-3: Broad, shallow central disc protrusion, greatly decreased in size, no   longer contacting the ventral cord  No spinal canal or foraminal stenosis  C3-4: Tiny central disc protrusion, not significantly changed  No spinal canal   or foraminal stenosis  C4-5: Broad left paracentral/foraminal disc protrusion, not significantly   changed  Left uncovertebral hypertrophy  Stable mild effacement of the left   lateral recess  No right foraminal stenosis  Severe left foraminal stenosis,   stable  C5-6: Mild disc bulge and posterior osteophytic ridging with left foraminal   extension  Bilateral uncovertebral hypertrophy, left more than right  Stable   effacement of the left lateral recess  Moderate right and severe left foraminal   stenosis, not significant changed  C6-7: Mild disc bulge and posterior osteophytic ridging  Bilateral uncovertebral   hypertrophy  No spinal canal stenosis  Severe right and moderate left foraminal   stenosis, not significant changed     C7-T1: Normal     Visualized Upper Thoracic Spine: Normal   Paraspinal Soft Tissues: Unremarkable

## 2022-07-27 NOTE — PATIENT INSTRUCTIONS
Lumbar Spinal Stenosis   WHAT YOU NEED TO KNOW:   What is lumbar spinal stenosis? Lumbar spinal stenosis is narrowing of the spinal canal in your lower back  Your spinal canal is the opening in your spine that contains your spinal cord  When your spinal canal narrows, it may put pressure on your spinal cord and nerves  What causes lumbar spinal stenosis? Narrowing of your spinal canal may be caused by changes that happen as you age  These changes include bone spurs (growths), herniated discs, and thickened ligaments  Bone growths can be caused by osteoarthritis  A herniated disc bulges out between the vertebrae (bones) and into your spinal canal  Discs are spongy cushions between the vertebrae in your spine  Herniated discs may be caused by activities that increase stress on the spine  An example is heavy lifting  Ligaments that connect the vertebrae may thicken and harden as you get older  Other conditions, such as spinal injuries and Paget's disease, can also cause spinal stenosis  What are the signs and symptoms of lumbar spinal stenosis? Signs and symptoms may start or get worse when you stand or walk  They are often relieved when you sit or lean forward  Low back pain    Pain, numbness, tingling, or weakness in one or both legs    Pain in your buttocks that extends to your thighs or legs    How is lumbar spinal stenosis diagnosed? Your healthcare provider will ask about your symptoms and when they started  He or she will ask if you have any medical conditions  Your provider may ask you to lift, bend, walk, sit, or reach  An x-ray, MRI or a CT may show problems in your spine that are causing spinal stenosis  You may be given contrast liquid to help the spine show up better in the pictures  Tell the healthcare provider if you have ever had an allergic reaction to contrast liquid  Do not enter the MRI room with anything metal  Metal can cause serious injury   Tell the healthcare provider if you have any metal in or on your body  How is lumbar spinal stenosis treated? NSAIDs , such as ibuprofen, help decrease swelling, pain, and fever  NSAIDs can cause stomach bleeding or kidney problems in certain people  If you take blood thinner medicine, always ask your healthcare provider if NSAIDs are safe for you  Always read the medicine label and follow directions  Acetaminophen  decreases pain and fever  It is available without a doctor's order  Ask how much to take and how often to take it  Follow directions  Read the labels of all other medicines you are using to see if they also contain acetaminophen, or ask your doctor or pharmacist  Acetaminophen can cause liver damage if not taken correctly  Do not use more than 4 grams (4,000 milligrams) total of acetaminophen in one day  Prescription pain medicine  may be given  Ask how to take this medicine safely  Muscle relaxers  help decrease pain and muscle spasms  A steroid injection  may be given to reduce inflammation  Steroid medicine is injected into the epidural space  The epidural space is between your spinal cord and vertebrae  A nerve block  is an injection of numbing medicine  You may need a nerve block if your pain is not going away, or is getting worse  Surgery  may be needed to widen your spinal canal or decrease pressure on your spinal cord  Surgery may also be done to fix damaged or injured vertebrae in your back  How can I manage my symptoms? Go to physical and occupational therapy  as directed  A physical therapist teaches you exercises to help improve movement and strength, and to decrease pain  An occupational therapist teaches you skills to help with your daily activities  Rest  when you feel it is needed  Slowly start to do more each day  Return to your daily activities as directed  Apply heat on your back for 20 to 30 minutes every 2 hours for as many days as directed  Heat helps decrease pain and muscle spasms  Apply ice  on your back for 15 to 20 minutes every hour or as directed  Use an ice pack, or put crushed ice in a plastic bag  Cover it with a towel before you apply it to your skin  Ice helps prevent tissue damage and decreases swelling and pain  When should I seek immediate care? You have pain in your leg that does not go away or gets worse  You have trouble moving your legs  You cannot control when you urinate or have a bowel movement  When should I contact my healthcare provider? You have new or worsening symptoms  Your symptoms keep you from doing your daily activities  You have questions or concerns about your condition or care  CARE AGREEMENT:   You have the right to help plan your care  Learn about your health condition and how it may be treated  Discuss treatment options with your healthcare providers to decide what care you want to receive  You always have the right to refuse treatment  The above information is an  only  It is not intended as medical advice for individual conditions or treatments  Talk to your doctor, nurse or pharmacist before following any medical regimen to see if it is safe and effective for you  © Copyright [employer] 2022 Information is for End User's use only and may not be sold, redistributed or otherwise used for commercial purposes   All illustrations and images included in CareNotes® are the copyrighted property of [employer]  or [address] [place] [address] [place]

## 2022-07-29 ENCOUNTER — TELEPHONE (OUTPATIENT)
Dept: ADMINISTRATIVE | Facility: OTHER | Age: 62
End: 2022-07-29

## 2022-07-29 PROBLEM — G47.09 OTHER INSOMNIA: Status: ACTIVE | Noted: 2022-07-29

## 2022-07-29 PROBLEM — M85.89 OSTEOPENIA OF MULTIPLE SITES: Status: ACTIVE | Noted: 2019-07-24

## 2022-07-29 PROBLEM — M47.812 CERVICAL SPONDYLOSIS WITHOUT MYELOPATHY: Status: ACTIVE | Noted: 2018-11-01

## 2022-07-29 PROBLEM — E78.2 MIXED HYPERLIPIDEMIA: Status: ACTIVE | Noted: 2018-07-11

## 2022-07-29 PROBLEM — I10 HYPERTENSION, ESSENTIAL: Status: ACTIVE | Noted: 2019-10-16

## 2022-07-29 PROBLEM — M48.062 SPINAL STENOSIS OF LUMBAR REGION WITH NEUROGENIC CLAUDICATION: Status: ACTIVE | Noted: 2021-10-22

## 2022-07-29 PROBLEM — Z86.010 HISTORY OF COLON POLYPS: Status: ACTIVE | Noted: 2022-07-29

## 2022-07-29 PROBLEM — F41.9 ANXIETY: Status: ACTIVE | Noted: 2022-07-29

## 2022-07-29 PROBLEM — Z86.0100 HISTORY OF COLON POLYPS: Status: ACTIVE | Noted: 2022-07-29

## 2022-07-29 PROBLEM — M54.12 CERVICAL RADICULOPATHY: Status: ACTIVE | Noted: 2018-11-01

## 2022-07-29 PROBLEM — E55.9 VITAMIN D DEFICIENCY: Status: ACTIVE | Noted: 2020-12-08

## 2022-07-29 NOTE — PROGRESS NOTES
Assessment/Plan:  Problem List Items Addressed This Visit        Respiratory    Non-seasonal allergic rhinitis     Management per ENT  Stable on Flonase  Cardiovascular and Mediastinum    Hypertension, essential     Stable  Check blood pressure outside of office  Recommend lifestyle modifications  Relevant Orders    CBC and differential    Comprehensive metabolic panel    Ambulatory Referral to Weight Management       Nervous and Auditory    Cervical radiculopathy     Management per Pain Management  Musculoskeletal and Integument    Osteopenia of multiple sites     Next Dexa due 9/8/22  No Rx previously  Continue Calcium and Vitamin D 1000IU daily  Relevant Orders    DXA bone density spine hip and pelvis    Ambulatory Referral to Nutrition Services    Vitamin D 25 hydroxy    Ambulatory Referral to Weight Management    Cervical spondylosis without myelopathy     Management per Pain Management  Other    Vitamin D deficiency     Pending labs  Continue MVI, Calcium, and Vitamin D 1000 daily  Relevant Orders    Vitamin D 25 hydroxy    Mixed hyperlipidemia     Pending labs  Continue Lipitor 10mg QHS  Recommend lifestyle modifications  Relevant Orders    Ambulatory Referral to Nutrition Services    CBC and differential    Comprehensive metabolic panel    Lipid panel    TSH, 3rd generation with Free T4 reflex    LDL cholesterol, direct    Ambulatory Referral to Weight Management    Anxiety     Stable s Rx or counseling  Relevant Orders    TSH, 3rd generation with Free T4 reflex    Other insomnia     Stable on Trazodone 50mg QHS PRN  Overweight     Stable  Recommend lifestyle modifications  Relevant Orders    Ambulatory Referral to Nutrition Services    Ambulatory Referral to Weight Management    Major depressive disorder with single episode, in remission ([facility] [address] )     Stable s Rx or counseling            Spinal stenosis of lumbar region with neurogenic claudication     Management per Pain Management  History of colon polyps     Colonoscopy is up-to-date  Other Visit Diagnoses     Annual physical exam    -  Primary    Screening for diabetes mellitus        Relevant Orders    Hemoglobin A1C    Screening for cardiovascular condition        Relevant Orders    CBC and differential    Comprehensive metabolic panel    Lipid panel    LDL cholesterol, direct    BMI 28 0-28 9,adult        Relevant Orders    Ambulatory Referral to Nutrition Services    Ambulatory Referral to Weight Management    Screening for HIV (human immunodeficiency virus)        Relevant Orders    HIV 1/2 Antigen/Antibody (4th Generation) w Reflex [facility]           Return in about 6 weeks (around 9/12/2022) for F/U HTN, HL, Anx/Dep, Insomnia, Vit D Def, Labs  Future Appointments   Date Time Provider Zachery Mahoney   9/9/2022  8:00 AM Matthew Barbosa DO [facility]   9/13/2022 10:40 AM Rissa January, DO [facility]   10/17/2022  1:30 PM Jamarcus Whyte MD [facility]        Subjective:     Ashly Corona is a 57 y o  female who presents today as a new patient for her medical conditions  New Patient    Previous PCP:  Dr Aislinn Valle at [facility]  Reason for Transfer:  Insurance   Last seen by previous PCP:  5/12/22  Last Labs:  10/28/21  Last Physical:  Unknown  Medical Records Requested:  No      HPI:  Chief Complaint   Patient presents with    Physical Exam     Hypertension  Weight   New position     Medication Refill     Not at this time   HM     Did not get second booster  HIV screening okay      -- Above per clinical staff and reviewed  --      HPI      Today:      Controlled Substance Review    [place] PDMP or [place]  reviewed: No red flags were identified; safe to proceed with prescription  Monia Le       Overweight - Trying to watch diet, but would like to cut back on salt  Previosly had success c keto diet  +Exercise - Swimming for 30 minutes, 3 days per week; Yoga for 1 hour, 1 day per week; Walking for 30 minutes, 3-4 days per week; Hiking for 2 hours, 2 times per month; Elliptical if not swimming; Weightlifting for 30 minutes, 1 day per week  HTN - On Losartan 50mg QD  D/C Ramipril due to ACE-I cough  No higher dose or other HTN Rx previously  Checking BP on arm cuff outside of office 130/86  Hyperlipidemia - On Lipitor 10mg QHS  No higher dose or alternate statin previously  Anxiety / Depression - Stable  Good social supports  No SI/HI/AH/VH  Previously on Celexa (D/C due anorgasmia, weight gain), Ativan in   No counseling previously  Insomnia - On Trazodone 100mg 1/2 tab QHS PRN - using 1-2 times per week  Without Rx, has difficulty falling asleep - sometimes takes 2 hours  With Rx, can fall asleep within 20 minutes  Sleeps 8 5 hours c or s Rx  No higher dose or alternate sleep Rx previously  PHQ-2/9 Depression Screening    Little interest or pleasure in doing things: 0 - not at all  Feeling down, depressed, or hopeless: 0 - not at all  Trouble falling or staying asleep, or sleeping too much: 0 - not at all  Feeling tired or having little energy: 1 - several days  Poor appetite or overeatin - not at all  Feeling bad about yourself - or that you are a failure or have let yourself or your family down: 0 - not at all  Trouble concentrating on things, such as reading the newspaper or watching television: 0 - not at all  Moving or speaking so slowly that other people could have noticed   Or the opposite - being so fidgety or restless that you have been moving around a lot more than usual: 0 - not at all  Thoughts that you would be better off dead, or of hurting yourself in some way: 0 - not at all  PHQ-2 Score: 0  PHQ-2 Interpretation: Negative depression screen  PHQ-9 Score: 1   PHQ-9 Interpretation: No or Minimal depression FANNY-7 Flowsheet Screening    Flowsheet Row Most Recent Value   Over the last 2 weeks, how often have you been bothered by any of the following problems? Feeling nervous, anxious, or on edge 0   Not being able to stop or control worrying 0   Worrying too much about different things 0   Trouble relaxing 1   Being so restless that it is hard to sit still 0   Becoming easily annoyed or irritable 0   Feeling afraid as if something awful might happen 0   FANNY-7 Total Score 1        MDQ:  1, Asynchronous, No Problem    Vitamin D Deficiency - s/p Maddie  Taking MVI, Calcium 1200mg daily, and OTC Vitamin D  Osteopenia - Next Dexa due 9/8/22  No Rx previously  Lumbar Stenosis c Neurogenic Claudication L4-L5 / Lumbar Spondylosis / Myofascial Pain Syndrome / Cervical Disc Disorder with Radiculopathy of Mid-Cervical Region / Cervical Spondylosis - Management per Pain Management per Dr Celester Hammans  Next appt PRN? Pending EMG B/L LE with Neuro Dr Gavino Delgado appointment 10/22  Rx Tizanidine 4mg QHS, but not taking as not needed yet  Chronic Sinus Problems / AR - Management per ENT Dr Gabriella Erickson  Next appt PRN  Stable on Flonase  H/O Colon Polyps - Next colonoscopy due 2/26/23  Reviewed:  Labs 10/28/21, Pain Management 7/27/22, Gyn 8/31/21    Sees Gyn - Pending appt c Dr Don Ernst at [facility] 9/22  Previously seeing [facility] and [facility]  Sees Dentist q6 months  Sees Optho q2 years  The following portions of the patient's history were reviewed and updated as appropriate: allergies, current medications, past family history, past medical history, past social history, past surgical history and problem list       Review of Systems   Constitutional: Positive for fatigue (Intermittent)  Negative for appetite change, chills, diaphoresis and fever  Respiratory: Negative for chest tightness and shortness of breath      Cardiovascular: Negative for chest pain  Gastrointestinal: Negative for abdominal pain, blood in stool, diarrhea, nausea and vomiting  Genitourinary: Negative for dysuria  Current Outpatient Medications   Medication Sig Dispense Refill    atorvastatin (LIPITOR) 10 mg tablet Take 10 mg by mouth daily at bedtime      b complex vitamins capsule Take 1 capsule by mouth daily      calcium carbonate (OS-DAWNA) 600 MG tablet Take 1,200 mg by mouth daily      cholecalciferol (VITAMIN D3) 1,000 units tablet Take 1,000 Units by mouth daily      fluticasone (FLONASE) 50 mcg/act nasal spray 2 sprays into each nostril daily OTC      losartan (COZAAR) 50 mg tablet Take 50 mg by mouth daily      Multiple Vitamin (multivitamin) tablet Take 1 tablet by mouth daily      Omega-3 Fatty Acids (fish oil) 1,000 mg Take 1,000 mg by mouth daily      Probiotic Product (PROBIOTIC-10 PO) Take 1 tablet by mouth in the morning      traZODone (DESYREL) 100 mg tablet Take 50 mg by mouth daily at bedtime as needed for sleep      zinc gluconate 50 mg tablet Take 50 mg by mouth daily      tiZANidine (ZANAFLEX) 4 mg tablet Take 1 tablet (4 mg total) by mouth daily at bedtime (Patient not taking: Reported on 8/1/2022) 30 tablet 1     No current facility-administered medications for this visit  Objective:  /78   Pulse 75   Temp (!) 97 3 °F (36 3 °C)   Resp 14   Ht 5' 6" (1 676 m)   Wt 80 9 kg (178 lb 6 4 oz)   LMP 01/01/2015 (Within Years)   SpO2 100%   Breastfeeding No   BMI 28 79 kg/m²    Wt Readings from Last 3 Encounters:   08/01/22 80 9 kg (178 lb 6 4 oz)   07/27/22 79 4 kg (175 lb)      BP Readings from Last 3 Encounters:   08/01/22 128/78   07/27/22 142/80          Physical Exam  Vitals and nursing note reviewed  Constitutional:       Appearance: Normal appearance  She is well-developed  HENT:      Head: Normocephalic and atraumatic        Right Ear: Tympanic membrane, ear canal and external ear normal       Left Ear: Tympanic membrane, ear canal and external ear normal       Nose: Nose normal       Right Sinus: No maxillary sinus tenderness or frontal sinus tenderness  Left Sinus: No maxillary sinus tenderness or frontal sinus tenderness  Mouth/Throat:      Mouth: Mucous membranes are moist       Pharynx: Oropharynx is clear  Uvula midline  Tonsils: No tonsillar exudate  Eyes:      Extraocular Movements: Extraocular movements intact  Conjunctiva/sclera: Conjunctivae normal       Pupils: Pupils are equal, round, and reactive to light  Cardiovascular:      Rate and Rhythm: Normal rate and regular rhythm  Pulses: Normal pulses  Heart sounds: Normal heart sounds  Pulmonary:      Effort: Pulmonary effort is normal       Breath sounds: Normal breath sounds  Abdominal:      General: Bowel sounds are normal  There is no distension  Palpations: Abdomen is soft  There is no mass  Tenderness: There is no abdominal tenderness  There is no guarding or rebound  Musculoskeletal:         General: No swelling or tenderness  Cervical back: Neck supple  Right lower leg: No edema  Left lower leg: No edema  Lymphadenopathy:      Cervical: No cervical adenopathy  Skin:     Findings: No rash  Neurological:      General: No focal deficit present  Mental Status: She is alert and oriented to person, place, and time  Psychiatric:         Mood and Affect: Mood normal          Behavior: Behavior normal          Thought Content: Thought content normal          Judgment: Judgment normal          Lab Results:      Lab Results   Component Value Date    HGBA1C 5 4 10/28/2021     No results found for: URICACID  Invalid input(s): BASENAME Vitamin D    No results found  POCT Labs      BMI Counseling: Body mass index is 28 79 kg/m²  The BMI is above normal  Nutrition recommendations include encouraging healthy choices of fruits and vegetables   Exercise recommendations include exercising 3-5 times per week  No pharmacotherapy was ordered  Rationale for BMI follow-up plan is due to patient being overweight or obese  Depression Screening and Follow-up Plan: Patient was screened for depression during today's encounter  They screened negative with a PHQ-2 score of 0

## 2022-07-29 NOTE — TELEPHONE ENCOUNTER
Upon review of the In Basket request we were able to locate, review, and update the patient chart as requested for CRC: Colonoscopy, DEXA Scan, Mammogram and Pap Smear (HPV) aka Cervical Cancer Screening  Any additional questions or concerns should be emailed to the Practice Liaisons via [email or web address] email, please do not reply via In Basket      Thank you  Salma Alvarez

## 2022-08-01 ENCOUNTER — OFFICE VISIT (OUTPATIENT)
Dept: FAMILY MEDICINE CLINIC | Facility: CLINIC | Age: 62
End: 2022-08-01
Payer: COMMERCIAL

## 2022-08-01 VITALS
HEIGHT: 66 IN | WEIGHT: 178.4 LBS | SYSTOLIC BLOOD PRESSURE: 128 MMHG | OXYGEN SATURATION: 100 % | HEART RATE: 75 BPM | TEMPERATURE: 97.3 F | BODY MASS INDEX: 28.67 KG/M2 | RESPIRATION RATE: 14 BRPM | DIASTOLIC BLOOD PRESSURE: 78 MMHG

## 2022-08-01 DIAGNOSIS — F41.9 ANXIETY: ICD-10-CM

## 2022-08-01 DIAGNOSIS — M85.89 OSTEOPENIA OF MULTIPLE SITES: ICD-10-CM

## 2022-08-01 DIAGNOSIS — F32.5 MAJOR DEPRESSIVE DISORDER WITH SINGLE EPISODE, IN REMISSION (HCC): ICD-10-CM

## 2022-08-01 DIAGNOSIS — Z13.6 SCREENING FOR CARDIOVASCULAR CONDITION: ICD-10-CM

## 2022-08-01 DIAGNOSIS — Z86.010 HISTORY OF COLON POLYPS: ICD-10-CM

## 2022-08-01 DIAGNOSIS — Z13.1 SCREENING FOR DIABETES MELLITUS: ICD-10-CM

## 2022-08-01 DIAGNOSIS — M54.12 CERVICAL RADICULOPATHY: ICD-10-CM

## 2022-08-01 DIAGNOSIS — Z11.4 SCREENING FOR HIV (HUMAN IMMUNODEFICIENCY VIRUS): ICD-10-CM

## 2022-08-01 DIAGNOSIS — M48.062 SPINAL STENOSIS OF LUMBAR REGION WITH NEUROGENIC CLAUDICATION: ICD-10-CM

## 2022-08-01 DIAGNOSIS — J30.89 NON-SEASONAL ALLERGIC RHINITIS, UNSPECIFIED TRIGGER: ICD-10-CM

## 2022-08-01 DIAGNOSIS — Z00.00 ANNUAL PHYSICAL EXAM: Primary | ICD-10-CM

## 2022-08-01 DIAGNOSIS — M47.812 CERVICAL SPONDYLOSIS WITHOUT MYELOPATHY: ICD-10-CM

## 2022-08-01 DIAGNOSIS — E55.9 VITAMIN D DEFICIENCY: ICD-10-CM

## 2022-08-01 DIAGNOSIS — E66.3 OVERWEIGHT: ICD-10-CM

## 2022-08-01 DIAGNOSIS — G47.09 OTHER INSOMNIA: ICD-10-CM

## 2022-08-01 DIAGNOSIS — I10 HYPERTENSION, ESSENTIAL: ICD-10-CM

## 2022-08-01 DIAGNOSIS — E78.2 MIXED HYPERLIPIDEMIA: ICD-10-CM

## 2022-08-01 PROCEDURE — 99396 PREV VISIT EST AGE 40-64: CPT | Performed by: FAMILY MEDICINE

## 2022-08-01 PROCEDURE — 3725F SCREEN DEPRESSION PERFORMED: CPT | Performed by: FAMILY MEDICINE

## 2022-08-01 PROCEDURE — 99203 OFFICE O/P NEW LOW 30 MIN: CPT | Performed by: FAMILY MEDICINE

## 2022-08-01 RX ORDER — MELATONIN
1000 DAILY
COMMUNITY

## 2022-08-01 RX ORDER — VITAMIN B COMPLEX
1 CAPSULE ORAL DAILY
COMMUNITY

## 2022-08-01 RX ORDER — CHLORAL HYDRATE 500 MG
1000 CAPSULE ORAL DAILY
COMMUNITY

## 2022-08-01 RX ORDER — MULTIVITAMIN
1 TABLET ORAL DAILY
COMMUNITY

## 2022-08-01 RX ORDER — FLUTICASONE PROPIONATE 50 MCG
2 SPRAY, SUSPENSION (ML) NASAL DAILY
COMMUNITY

## 2022-08-01 RX ORDER — PHENOL 1.4 %
1200 AEROSOL, SPRAY (ML) MUCOUS MEMBRANE DAILY
COMMUNITY
End: 2022-09-13

## 2022-08-01 RX ORDER — ZINC GLUCONATE 50 MG
50 TABLET ORAL DAILY
COMMUNITY

## 2022-08-01 NOTE — PATIENT INSTRUCTIONS
Wellness Visit for Adults   AMBULATORY CARE:   A wellness visit  is when you see your healthcare provider to get screened for health problems  Your healthcare provider will also give you advice on how to stay healthy  Write down your questions so you remember to ask them  Ask your healthcare provider how often you should have a wellness visit  What happens at a wellness visit:  Your healthcare provider will ask about your health, and your family history of health problems  This includes high blood pressure, heart disease, and cancer  He or she will ask if you have symptoms that concern you, if you smoke, and about your mood  You may also be asked about your intake of medicines, supplements, food, and alcohol  Any of the following may be done:  · Your weight  will be checked  Your height may also be checked so your body mass index (BMI) can be calculated  Your BMI shows if you are at a healthy weight  · Your blood pressure  and heart rate will be checked  Your temperature may also be checked  · Blood and urine tests  may be done  Blood tests may be done to check your cholesterol levels  Abnormal cholesterol levels increase your risk for heart disease and stroke  You may also need a blood or urine test to check for diabetes if you are at increased risk  Urine tests may be done to look for signs of an infection or kidney disease  · A physical exam  includes checking your heartbeat and lungs with a stethoscope  Your healthcare provider may also check your skin to look for sun damage  · Screening tests  may be recommended  A screening test is done to check for diseases that may not cause symptoms  The screening tests you may need depend on your age, gender, family history, and lifestyle habits  For example, colorectal screening may be recommended if you are 48years old or older  Screening tests you need if you are a woman:   · A Pap smear  is used to screen for cervical cancer   Pap smears are usually done every 3 to 5 years depending on your age  You may need them more often if you have had abnormal Pap smear test results in the past  Ask your healthcare provider how often you should have a Pap smear  · A mammogram  is an x-ray of your breasts to screen for breast cancer  Experts recommend mammograms every 2 years starting at age 48 years  You may need a mammogram at age 52 years or younger if you have an increased risk for breast cancer  Talk to your healthcare provider about when you should start having mammograms and how often you need them  Vaccines you may need:   · Get an influenza vaccine  every year  The influenza vaccine protects you from the flu  Several types of viruses cause the flu  The viruses change over time, so new vaccines are made each year  · Get a tetanus-diphtheria (Td) booster vaccine  every 10 years  This vaccine protects you against tetanus and diphtheria  Tetanus is a severe infection that may cause painful muscle spasms and lockjaw  Diphtheria is a severe bacterial infection that causes a thick covering in the back of your mouth and throat  · Get a human papillomavirus (HPV) vaccine  if you are female and aged 23 to 32 or male 23 to 24 and never received it  This vaccine protects you from HPV infection  HPV is the most common infection spread by sexual contact  HPV may also cause vaginal, penile, and anal cancers  · Get a pneumococcal vaccine  if you are aged 72 years or older  The pneumococcal vaccine is an injection given to protect you from pneumococcal disease  Pneumococcal disease is an infection caused by pneumococcal bacteria  The infection may cause pneumonia, meningitis, or an ear infection  · Get a shingles vaccine  if you are 60 or older, even if you have had shingles before  The shingles vaccine is an injection to protect you from the varicella-zoster virus  This is the same virus that causes chickenpox   Shingles is a painful rash that develops in people who had chickenpox or have been exposed to the virus  How to eat healthy:  My Plate is a model for planning healthy meals  It shows the types and amounts of foods that should go on your plate  Fruits and vegetables make up about half of your plate, and grains and protein make up the other half  A serving of dairy is included on the side of your plate  The amount of calories and serving sizes you need depends on your age, gender, weight, and height  Examples of healthy foods are listed below:  · Eat a variety of vegetables  such as dark green, red, and orange vegetables  You can also include canned vegetables low in sodium (salt) and frozen vegetables without added butter or sauces  · Eat a variety of fresh fruits , canned fruit in 100% juice, frozen fruit, and dried fruit  · Include whole grains  At least half of the grains you eat should be whole grains  Examples include whole-wheat bread, wheat pasta, brown rice, and whole-grain cereals such as oatmeal     · Eat a variety of protein foods such as seafood (fish and shellfish), lean meat, and poultry without skin (turkey and chicken)  Examples of lean meats include pork leg, shoulder, or tenderloin, and beef round, sirloin, tenderloin, and extra lean ground beef  Other protein foods include eggs and egg substitutes, beans, peas, soy products, nuts, and seeds  · Choose low-fat dairy products such as skim or 1% milk or low-fat yogurt, cheese, and cottage cheese  · Limit unhealthy fats  such as butter, hard margarine, and shortening  Exercise:  Exercise at least 30 minutes per day on most days of the week  Some examples of exercise include walking, biking, dancing, and swimming  You can also fit in more physical activity by taking the stairs instead of the elevator or parking farther away from stores  Include muscle strengthening activities 2 days each week  Regular exercise provides many health benefits   It helps you manage your weight, and decreases your risk for type 2 diabetes, heart disease, stroke, and high blood pressure  Exercise can also help improve your mood  Ask your healthcare provider about the best exercise plan for you  General health and safety guidelines:   · Do not smoke  Nicotine and other chemicals in cigarettes and cigars can cause lung damage  Ask your healthcare provider for information if you currently smoke and need help to quit  E-cigarettes or smokeless tobacco still contain nicotine  Talk to your healthcare provider before you use these products  · Limit alcohol  A drink of alcohol is 12 ounces of beer, 5 ounces of wine, or 1½ ounces of liquor  · Lose weight, if needed  Being overweight increases your risk of certain health conditions  These include heart disease, high blood pressure, type 2 diabetes, and certain types of cancer  · Protect your skin  Do not sunbathe or use tanning beds  Use sunscreen with a SPF 15 or higher  Apply sunscreen at least 15 minutes before you go outside  Reapply sunscreen every 2 hours  Wear protective clothing, hats, and sunglasses when you are outside  · Drive safely  Always wear your seatbelt  Make sure everyone in your car wears a seatbelt  A seatbelt can save your life if you are in an accident  Do not use your cell phone when you are driving  This could distract you and cause an accident  Pull over if you need to make a call or send a text message  · Practice safe sex  Use latex condoms if are sexually active and have more than one partner  Your healthcare provider may recommend screening tests for sexually transmitted infections (STIs)  · Wear helmets, lifejackets, and protective gear  Always wear a helmet when you ride a bike or motorcycle, go skiing, or play sports that could cause a head injury  Wear protective equipment when you play sports  Wear a lifejacket when you are on a boat or doing water sports      © Copyright [employer] 2022 Information is for End User's use only and may not be sold, redistributed or otherwise used for commercial purposes  All illustrations and images included in CareNotes® are the copyrighted property of [employer]  or Therese Campos  The above information is an  only  It is not intended as medical advice for individual conditions or treatments  Talk to your doctor, nurse or pharmacist before following any medical regimen to see if it is safe and effective for you  Weight Management   AMBULATORY CARE:   Why it is important to manage your weight:  Being overweight increases your risk of health conditions such as heart disease, high blood pressure, type 2 diabetes, and certain types of cancer  It can also increase your risk for osteoarthritis, sleep apnea, and other respiratory problems  Aim for a slow, steady weight loss  Even a small amount of weight loss can lower your risk of health problems  Risks of being overweight:  Extra weight can cause many health problems, including the following:  · Diabetes (high blood sugar level)    · High blood pressure or high cholesterol    · Heart disease    · Stroke    · Gallbladder or liver disease    · Cancer of the colon, breast, prostate, liver, or kidney    · Sleep apnea    · Arthritis or gout    Screening  is done to check for health conditions before you have signs or symptoms  If you are 28to 79years old, your blood sugar level may be checked every 3 years for signs of prediabetes or diabetes  Your healthcare provider will check your blood pressure at each visit  High blood pressure can lead to a stroke or other problems  Your provider may check for signs of heart disease, cancer, or other health problems  How to lose weight safely:  A safe and healthy way to lose weight is to eat fewer calories and get regular exercise  · You can lose up about 1 pound a week by decreasing the number of calories you eat by 500 calories each day   You can decrease calories by eating smaller portion sizes or by cutting out high-calorie foods  Read labels to find out how many calories are in the foods you eat  · You can also burn calories with exercise such as walking, swimming, or biking  You will be more likely to keep weight off if you make these changes part of your lifestyle  Exercise at least 30 minutes per day on most days of the week  You can also fit in more physical activity by taking the stairs instead of the elevator or parking farther away from stores  Ask your healthcare provider about the best exercise plan for you  Healthy meal plan for weight management:  A healthy meal plan includes a variety of foods, contains fewer calories, and helps you stay healthy  A healthy meal plan includes the following:     · Eat whole-grain foods more often  A healthy meal plan should contain fiber  Fiber is the part of grains, fruits, and vegetables that is not broken down by your body  Whole-grain foods are healthy and provide extra fiber in your diet  Some examples of whole-grain foods are whole-wheat breads and pastas, oatmeal, brown rice, and bulgur  · Eat a variety of vegetables every day  Include dark, leafy greens such as spinach, kale, lesli greens, and mustard greens  Eat yellow and orange vegetables such as carrots, sweet potatoes, and winter squash  · Eat a variety of fruits every day  Choose fresh or canned fruit (canned in its own juice or light syrup) instead of juice  Fruit juice has very little or no fiber  · Eat low-fat dairy foods  Drink fat-free (skim) milk or 1% milk  Eat fat-free yogurt and low-fat cottage cheese  Try low-fat cheeses such as mozzarella and other reduced-fat cheeses  · Choose meat and other protein foods that are low in fat  Choose beans or other legumes such as split peas or lentils  Choose fish, skinless poultry (chicken or turkey), or lean cuts of red meat (beef or pork)   Before you cook meat or poultry, cut off any visible fat  · Use less fat and oil  Try baking foods instead of frying them  Add less fat, such as margarine, sour cream, regular salad dressing and mayonnaise to foods  Eat fewer high-fat foods  Some examples of high-fat foods include french fries, doughnuts, ice cream, and cakes  · Eat fewer sweets  Limit foods and drinks that are high in sugar  This includes candy, cookies, regular soda, and sweetened drinks  Ways to decrease calories:   · Eat smaller portions  ? Use a small plate with smaller servings  ? Do not eat second helpings  ? When you eat at a restaurant, ask for a box and place half of your meal in the box before you eat  ? Share an entrée with someone else  · Replace high-calorie snacks with healthy, low-calorie snacks  ? Choose fresh fruit, vegetables, fat-free rice cakes, or air-popped popcorn instead of potato chips, nuts, or chocolate  ? Choose water or calorie-free drinks instead of soda or sweetened drinks  · Do not shop for groceries when you are hungry  You may be more likely to make unhealthy food choices  Take a grocery list of healthy foods and shop after you have eaten  · Eat regular meals  Do not skip meals  Skipping meals can lead to overeating later in the day  This can make it harder for you to lose weight  Eat a healthy snack in place of a meal if you do not have time to eat a regular meal  Talk with a dietitian to help you create a meal plan and schedule that is right for you  Other things to consider as you try to lose weight:   · Be aware of situations that may give you the urge to overeat, such as eating while watching television  Find ways to avoid these situations  For example, read a book, go for a walk, or do crafts  · Meet with a weight loss support group or friends who are also trying to lose weight  This may help you stay motivated to continue working on your weight loss goals      © Copyright [employer] 2022 Information is for End User's use only and may not be sold, redistributed or otherwise used for commercial purposes  All illustrations and images included in CareNotes® are the copyrighted property of [employer]  or Therese Campos  The above information is an  only  It is not intended as medical advice for individual conditions or treatments  Talk to your doctor, nurse or pharmacist before following any medical regimen to see if it is safe and effective for you  Cholesterol and Your Health   AMBULATORY CARE:   Cholesterol  is a waxy, fat-like substance  Your body uses cholesterol to make hormones and new cells, and to protect nerves  Cholesterol is made by your body  It also comes from certain foods you eat, such as meat and dairy products  Your healthcare provider can help you set goals for your cholesterol levels  He or she can help you create a plan to meet your goals  Cholesterol level goals: Your cholesterol level goals depend on your risk for heart disease, your age, and your other health conditions  The following are general guidelines:  · Total cholesterol  includes low-density lipoprotein (LDL), high-density lipoprotein (HDL), and triglyceride levels  The total cholesterol level should be lower than 200 mg/dL and is best at about 150 mg/dL  · LDL cholesterol  is called bad cholesterol  because it forms plaque in your arteries  As plaque builds up, your arteries become narrow, and less blood flows through  When plaque decreases blood flow to your heart, you may have chest pain  If plaque completely blocks an artery that brings blood to your heart, you may have a heart attack  Plaque can break off and form blood clots  Blood clots may block arteries in your brain and cause a stroke  The level should be less than 130 mg/dL and is best at about 100 mg/dL  · HDL cholesterol  is called good cholesterol  because it helps remove LDL cholesterol from your arteries   It does this by attaching to LDL cholesterol and carrying it to your liver  Your liver breaks down LDL cholesterol so your body can get rid of it  High levels of HDL cholesterol can help prevent a heart attack and stroke  Low levels of HDL cholesterol can increase your risk for heart disease, heart attack, and stroke  The level should be 60 mg/dL or higher  · Triglycerides  are a type of fat that store energy from foods you eat  High levels of triglycerides also cause plaque buildup  This can increase your risk for a heart attack or stroke  If your triglyceride level is high, your LDL cholesterol level may also be high  The level should be less than 150 mg/dL  Any of the following can increase your risk for high cholesterol:   · Smoking cigarettes    · Being overweight or obese, or not getting enough exercise    · Drinking large amounts of alcohol    · A medical condition such as hypertension (high blood pressure) or diabetes    · Certain genes passed from your parents to you    · Age older than 65 years    What you need to know about having your cholesterol levels checked: Adults 21to 39years of age should have their cholesterol levels checked every 4 to 6 years  Adults 45 years or older should have their cholesterol checked every 1 to 2 years  You may need your cholesterol checked more often, or at a younger age, if you have risk factors for heart disease  You may also need to have your cholesterol checked more often if you have other health conditions, such as diabetes  Blood tests are used to check cholesterol levels  Blood tests measure your levels of triglycerides, LDL cholesterol, and HDL cholesterol  How healthy fats affect your cholesterol levels:  Healthy fats, also called unsaturated fats, help lower LDL cholesterol and triglyceride levels  Healthy fats include the following:  · Monounsaturated fats  are found in foods such as olive oil, canola oil, avocado, nuts, and olives      · Polyunsaturated fats,  such as omega 3 fats, are found in fish, such as salmon, trout, and tuna  They can also be found in plant foods such as flaxseed, walnuts, and soybeans  How unhealthy fats affect your cholesterol levels:  Unhealthy fats increase LDL cholesterol and triglyceride levels  They are found in foods high in cholesterol, saturated fat, and trans fat:  · Cholesterol  is found in eggs, dairy, and meat  · Saturated fat  is found in butter, cheese, ice cream, whole milk, and coconut oil  Saturated fat is also found in meat, such as sausage, hot dogs, and bologna  · Trans fat  is found in liquid oils and is used in fried and baked foods  Foods that contain trans fats include chips, crackers, muffins, sweet rolls, microwave popcorn, and cookies  Treatment  for high cholesterol will also decrease your risk of heart disease, heart attack, and stroke  Treatment may include any of the following:  · Lifestyle changes  may include food, exercise, weight loss, and quitting smoking  You may also need to decrease the amount of alcohol you drink  Your healthcare provider will want you to start with lifestyle changes  Other treatment may be added if lifestyle changes are not enough  Your healthcare provider may recommend you work with a team to manage hyperlipidemia  The team may include medical experts such as a dietitian, an exercise or physical therapist, and a behavior therapist  Your family members may be included in helping you create lifestyle changes  · Medicines  may be given to lower your LDL cholesterol, triglyceride levels, or total cholesterol level  You may need medicines to lower your cholesterol if any of the following is true:    ? You have a history of stroke, TIA, unstable angina, or a heart attack  ? Your LDL cholesterol level is 190 mg/dL or higher  ? You are age 36 to 76 years, have diabetes or heart disease risk factors, and your LDL cholesterol is 70 mg/dL or higher      · Supplements  include fish oil, red yeast rice, and garlic  Fish oil may help lower your triglyceride and LDL cholesterol levels  It may also increase your HDL cholesterol level  Red yeast rice may help decrease your total cholesterol level and LDL cholesterol level  Garlic may help lower your total cholesterol level  Do not take any supplements without talking to your healthcare provider  Food changes you can make to lower your cholesterol levels:  A dietitian can help you create a healthy eating plan  He or she can show you how to read food labels and choose foods low in saturated fat, trans fats, and cholesterol  · Decrease the total amount of fat you eat  Choose lean meats, fat-free or 1% fat milk, and low-fat dairy products, such as yogurt and cheese  Try to limit or avoid red meats  Limit or do not eat fried foods or baked goods, such as cookies  · Replace unhealthy fats with healthy fats  Cook foods in olive oil or canola oil  Choose soft margarines that are low in saturated fat and trans fat  Seeds, nuts, and avocados are other examples of healthy fats  · Eat foods with omega-3 fats  Examples include salmon, tuna, mackerel, walnuts, and flaxseed  Eat fish 2 times per week  Pregnant women should not eat fish that have high levels of mercury, such as shark, swordfish, and meggan mackerel  · Increase the amount of high-fiber foods you eat  High-fiber foods can help lower your LDL cholesterol  Aim to get between 20 and 30 grams of fiber each day  Fruits and vegetables are high in fiber  Eat at least 5 servings each day  Other high-fiber foods are whole-grain or whole-wheat breads, pastas, or cereals, and brown rice  Eat 3 ounces of whole-grain foods each day  Increase fiber slowly  You may have abdominal discomfort, bloating, and gas if you add fiber to your diet too quickly  · Eat healthy protein foods  Examples include low-fat dairy products, skinless chicken and turkey, fish, and nuts      · Limit foods and drinks that are high in sugar  Your dietitian or healthcare provider can help you create daily limits for high-sugar foods and drinks  The limit may be lower if you have diabetes or another health condition  Limits can also help you lose weight if needed  Lifestyle changes you can make to lower your cholesterol levels:   · Maintain a healthy weight  Ask your healthcare provider what a healthy weight is for you  Ask him or her to help you create a weight loss plan if needed  Weight loss can decrease your total cholesterol and triglyceride levels  Weight loss may also help keep your blood pressure at a healthy level  · Be physically active throughout the day  Physical activity, such as exercise, can help lower your total cholesterol level and maintain a healthy weight  Physical activity can also help increase your HDL cholesterol level  Work with your healthcare provider to create an program that is right for you  Get at least 30 to 40 minutes of moderate physical activity most days of the week  Examples of exercise include brisk walking, swimming, or biking  Also include strength training at least 2 times each week  Your healthcare providers can help you create a physical activity plan  · Do not smoke  Nicotine and other chemicals in cigarettes and cigars can raise your cholesterol levels  Ask your healthcare provider for information if you currently smoke and need help to quit  E-cigarettes or smokeless tobacco still contain nicotine  Talk to your healthcare provider before you use these products  · Limit or do not drink alcohol  Alcohol can increase your triglyceride levels  Ask your healthcare provider before you drink alcohol  Ask how much is okay for you to drink in 24 hours or 1 week  Follow up with your doctor as directed:  Write down your questions so you remember to ask them during your visits    © Copyright [employer] 2022 Information is for End User's use only and may not be sold, redistributed or otherwise used for commercial purposes  All illustrations and images included in CareNotes® are the copyrighted property of [employer]  or Therese Campos  The above information is an  only  It is not intended as medical advice for individual conditions or treatments  Talk to your doctor, nurse or pharmacist before following any medical regimen to see if it is safe and effective for you

## 2022-08-03 NOTE — PROGRESS NOTES
Assessment/Plan:  Problem List Items Addressed This Visit        Respiratory    Non-seasonal allergic rhinitis     Management per ENT  Stable on Flonase  Cardiovascular and Mediastinum    Hypertension, essential     Stable  Check blood pressure outside of office  Recommend lifestyle modifications  Relevant Orders    CBC and differential    Comprehensive metabolic panel    Ambulatory Referral to Weight Management       Nervous and Auditory    Cervical radiculopathy     Management per Pain Management  Musculoskeletal and Integument    Osteopenia of multiple sites     Next Dexa due 9/8/22  No Rx previously  Continue Calcium and Vitamin D 1000IU daily  Relevant Orders    DXA bone density spine hip and pelvis    Ambulatory Referral to Nutrition Services    Vitamin D 25 hydroxy    Ambulatory Referral to Weight Management    Cervical spondylosis without myelopathy     Management per Pain Management  Other    Vitamin D deficiency     Pending labs  Continue MVI, Calcium, and Vitamin D 1000 daily  Relevant Orders    Vitamin D 25 hydroxy    Mixed hyperlipidemia     Pending labs  Continue Lipitor 10mg QHS  Recommend lifestyle modifications  Relevant Orders    Ambulatory Referral to Nutrition Services    CBC and differential    Comprehensive metabolic panel    Lipid panel    TSH, 3rd generation with Free T4 reflex    LDL cholesterol, direct    Ambulatory Referral to Weight Management    Anxiety     Stable s Rx or counseling  Relevant Orders    TSH, 3rd generation with Free T4 reflex    Other insomnia     Stable on Trazodone 50mg QHS PRN  Overweight     Stable  Recommend lifestyle modifications  Relevant Orders    Ambulatory Referral to Nutrition Services    Ambulatory Referral to Weight Management    Major depressive disorder with single episode, in remission ([facility] [address] )     Stable s Rx or counseling            Spinal stenosis of lumbar region with neurogenic claudication     Management per Pain Management  History of colon polyps     Colonoscopy is up-to-date  Other Visit Diagnoses     Annual physical exam    -  Primary    Screening for diabetes mellitus        Relevant Orders    Hemoglobin A1C    Screening for cardiovascular condition        Relevant Orders    CBC and differential    Comprehensive metabolic panel    Lipid panel    LDL cholesterol, direct    BMI 28 0-28 9,adult        Relevant Orders    Ambulatory Referral to Nutrition Services    Ambulatory Referral to Weight Management    Screening for HIV (human immunodeficiency virus)        Relevant Orders    HIV 1/2 Antigen/Antibody (4th Generation) w Reflex [facility]           Return in about 6 weeks (around 9/12/2022) for F/U HTN, HL, Anx/Dep, Insomnia, Vit D Def, Labs  Future Appointments   Date Time Provider Zachery Mahoney   9/9/2022  8:00 AM Whitney Mcleod DO [facility]   9/13/2022 10:40 AM Saji Tate DO [facility]   10/17/2022  1:30 PM Brian Salazar MD NEURO [facility]        Subjective:     Ronald Naik is a 57 y o  female who presents today for a follow-up on her chronic medical conditions  HPI:  Chief Complaint   Patient presents with    Physical Exam     Hypertension  Weight   New position     Medication Refill     Not at this time   HM     Did not get second booster  HIV screening okay      -- Above per clinical staff and reviewed  --      HPI      Today:      Physical    Trying to watch diet  +Exercise  Sees Gyn - Pending appt c Dr Criselda Gonzalez at [phone] 9/22  Previously seeing [facility] and Jefyr Douglas Ms  Cole Speaks, CRNP  Sees Dentist q6 months  Sees Optho q2 years        Health Maintenance   Topic Date Due    HIV Screening  Never done    Influenza Vaccine (1) 09/01/2022    COVID-19 Vaccine (4 - Booster for Moderna series) 11/01/2022 (Originally 3/10/2022)    Breast Cancer Screening: Mammogram  01/12/2023    Colorectal Cancer Screening  02/26/2023    BMI: Followup Plan  08/01/2023    BMI: Adult  08/01/2023    Annual Physical  08/01/2023    Depression Remission PHQ  08/01/2023    DXA SCAN  09/08/2023    Cervical Cancer Screening  08/04/2025    DTaP,Tdap,and Td Vaccines (2 - Td or Tdap) 12/08/2030    Hepatitis C Screening  Completed    Pneumococcal Vaccine: Pediatrics (0 to 5 Years) and At-Risk Patients (6 to 59 Years)  Aged Out    HIB Vaccine  Aged Out    Hepatitis B Vaccine  Aged Out    IPV Vaccine  Aged Out    Hepatitis A Vaccine  Aged Out    Meningococcal ACWY Vaccine  Aged Out    HPV Vaccine  Aged Out         The following portions of the patient's history were reviewed and updated as appropriate: allergies, current medications, past family history, past medical history, past social history, past surgical history and problem list       Review of Systems     See other note          Current Outpatient Medications   Medication Sig Dispense Refill    atorvastatin (LIPITOR) 10 mg tablet Take 10 mg by mouth daily at bedtime      b complex vitamins capsule Take 1 capsule by mouth daily      calcium carbonate (OS-DAWNA) 600 MG tablet Take 1,200 mg by mouth daily      cholecalciferol (VITAMIN D3) 1,000 units tablet Take 1,000 Units by mouth daily      fluticasone (FLONASE) 50 mcg/act nasal spray 2 sprays into each nostril daily OTC      losartan (COZAAR) 50 mg tablet Take 50 mg by mouth daily      Multiple Vitamin (multivitamin) tablet Take 1 tablet by mouth daily      Omega-3 Fatty Acids (fish oil) 1,000 mg Take 1,000 mg by mouth daily      Probiotic Product (PROBIOTIC-10 PO) Take 1 tablet by mouth in the morning      traZODone (DESYREL) 100 mg tablet Take 50 mg by mouth daily at bedtime as needed for sleep      zinc gluconate 50 mg tablet Take 50 mg by mouth daily      tiZANidine (ZANAFLEX) 4 mg tablet Take 1 tablet (4 mg total) by mouth daily at bedtime (Patient not taking: Reported on 8/1/2022) 30 tablet 1     No current facility-administered medications for this visit  Objective:  /78   Pulse 75   Temp (!) 97 3 °F (36 3 °C)   Resp 14   Ht 5' 6" (1 676 m)   Wt 80 9 kg (178 lb 6 4 oz)   LMP 01/01/2015 (Within Years)   SpO2 100%   Breastfeeding No   BMI 28 79 kg/m²    Wt Readings from Last 3 Encounters:   08/01/22 80 9 kg (178 lb 6 4 oz)   07/27/22 79 4 kg (175 lb)      BP Readings from Last 3 Encounters:   08/01/22 128/78   07/27/22 142/80          Physical Exam     Vitals and nursing note reviewed  Constitutional:       Appearance: Normal appearance  She is well-developed  HENT:      Head: Normocephalic and atraumatic  Right Ear: Tympanic membrane, ear canal and external ear normal       Left Ear: Tympanic membrane, ear canal and external ear normal       Nose: Nose normal       Right Sinus: No maxillary sinus tenderness or frontal sinus tenderness  Left Sinus: No maxillary sinus tenderness or frontal sinus tenderness  Mouth/Throat:      Mouth: Mucous membranes are moist       Pharynx: Oropharynx is clear  Uvula midline  Tonsils: No tonsillar exudate  Eyes:      Extraocular Movements: Extraocular movements intact  Conjunctiva/sclera: Conjunctivae normal       Pupils: Pupils are equal, round, and reactive to light  Cardiovascular:      Rate and Rhythm: Normal rate and regular rhythm  Pulses: Normal pulses  Heart sounds: Normal heart sounds  Pulmonary:      Effort: Pulmonary effort is normal       Breath sounds: Normal breath sounds  Abdominal:      General: Bowel sounds are normal  There is no distension  Palpations: Abdomen is soft  There is no mass  Tenderness: There is no abdominal tenderness  There is no guarding or rebound  Musculoskeletal:         General: No swelling or tenderness  Cervical back: Neck supple  Right lower leg: No edema  Left lower leg: No edema  Lymphadenopathy:      Cervical: No cervical adenopathy  Skin:     Findings: No rash  Neurological:      General: No focal deficit present  Mental Status: She is alert and oriented to person, place, and time  Psychiatric:         Mood and Affect: Mood normal          Behavior: Behavior normal          Thought Content: Thought content normal          Judgment: Judgment normal         Lab Results:      Lab Results   Component Value Date    HGBA1C 5 4 10/28/2021     No results found for: URICACID  Invalid input(s): BASENAME Vitamin D    No results found       POCT Labs

## 2022-08-04 ENCOUNTER — TELEPHONE (OUTPATIENT)
Dept: RADIOLOGY | Facility: CLINIC | Age: 62
End: 2022-08-04

## 2022-08-04 DIAGNOSIS — M79.18 MYOFASCIAL PAIN SYNDROME: Primary | ICD-10-CM

## 2022-08-04 RX ORDER — METHOCARBAMOL 500 MG/1
500 TABLET, FILM COATED ORAL 3 TIMES DAILY PRN
Qty: 90 TABLET | Refills: 0 | Status: SHIPPED | OUTPATIENT
Start: 2022-08-04 | End: 2023-04-24 | Stop reason: SDUPTHER

## 2022-08-04 NOTE — TELEPHONE ENCOUNTER
Pt came in to the office  States she will be leaving for vacation at the end of the day today  She recently had OV with FQ and was given script for tizanidine, pt reports being unable to tolerable s/e (fatigue / tiredness) and is no longer taking the tizanidine  Pt would like to know if she can get another prescription for a different medication prior to her trip  Please advise, thank you

## 2022-08-04 NOTE — TELEPHONE ENCOUNTER
Script for Methocarbamol sent, I tried to see if she was previously on this but couldn't find anything

## 2022-08-04 NOTE — TELEPHONE ENCOUNTER
Attempted to call pt on her cell, voice mailbox is full, unable to leave a message  Attempted to call pt's home #, this number is no longer in service  Pls try and call pt on 8/5/22

## 2022-08-19 ENCOUNTER — TELEPHONE (OUTPATIENT)
Dept: RADIOLOGY | Facility: CLINIC | Age: 62
End: 2022-08-19

## 2022-08-19 DIAGNOSIS — M51.16 INTERVERTEBRAL DISC DISORDER WITH RADICULOPATHY OF LUMBAR REGION: Primary | ICD-10-CM

## 2022-08-19 NOTE — TELEPHONE ENCOUNTER
She is disc bulging and arthritis at L4-5 and L5-S1 where there stenosis worst at L4-5 that is moderate  Suspect that is the etiology of the pain that she experiences    Can proceed with a right L4-5 transforaminal epidural steroid injection to help with symptoms that she would like

## 2022-08-19 NOTE — TELEPHONE ENCOUNTER
Pt said she was seen in our office on 7/27 and FQ asked her to drop off her CD of her MRI and she did the following day  She is scheduled for the EMG in Oct   When she was in the office her pain was intermittent, she then went on vac to [facility] and she couldn't even walk on the beach, after walking 25 yrds she had to stop  She get pain in B/L buttocks that goes into the iliac crest and into the Rt groin  The pain occurs with activities  She has tried ice  She wants to know what the cause of the pain is? Does she need an injection?

## 2022-08-19 NOTE — TELEPHONE ENCOUNTER
Pt informed of the same as stated in previous task  Pt is interested in the injection  Pt denies taking any blood thinners  Pt aware that our  will call her to set up the injection

## 2022-08-19 NOTE — TELEPHONE ENCOUNTER
Pt called in to schedule a procedure  Please be advised thank you    Pt can be reached @[phone]

## 2022-08-22 ENCOUNTER — OFFICE VISIT (OUTPATIENT)
Dept: BARIATRICS | Facility: CLINIC | Age: 62
End: 2022-08-22
Payer: COMMERCIAL

## 2022-08-22 VITALS
SYSTOLIC BLOOD PRESSURE: 126 MMHG | DIASTOLIC BLOOD PRESSURE: 82 MMHG | HEART RATE: 69 BPM | HEIGHT: 66 IN | BODY MASS INDEX: 29.18 KG/M2 | TEMPERATURE: 97 F | WEIGHT: 181.6 LBS | RESPIRATION RATE: 16 BRPM

## 2022-08-22 DIAGNOSIS — E55.9 VITAMIN D DEFICIENCY: ICD-10-CM

## 2022-08-22 DIAGNOSIS — F32.5 MAJOR DEPRESSIVE DISORDER WITH SINGLE EPISODE, IN REMISSION (HCC): ICD-10-CM

## 2022-08-22 DIAGNOSIS — I10 HYPERTENSION, ESSENTIAL: ICD-10-CM

## 2022-08-22 DIAGNOSIS — R63.5 ABNORMAL WEIGHT GAIN: Primary | ICD-10-CM

## 2022-08-22 PROCEDURE — 99203 OFFICE O/P NEW LOW 30 MIN: CPT | Performed by: FAMILY MEDICINE

## 2022-08-22 NOTE — PROGRESS NOTES
Assessment/Plan:  Kaiser Simmons was seen today for consult  Diagnoses and all orders for this visit:    Abnormal weight gain  -     Insulin, fasting; Future  pending HbA1C TSH and CMP, vit D from PCP orders  1200-1300kcal diet plan handout given  Snack list provided  [facility] or personalized dietician appointments  Return with me in 3 mo  Hypertension, essential  Controlled continue current meds, weight loss will help   Vitamin D deficiency  Advised check levels, taking 5000 units on and off  Major depressive disorder with single episode, in remission ([address] )  Controlled for the most part   Avoid alcohol  - Discussed options of HealthyCORE-Intensive Lifestyle Intervention Program, Very Low Calorie Diet-VLCD and Conservative Program and the role of weight loss medications  - Explained the importance of making lifestyle changes first before starting any anti-obesity medications  Patient should demonstrate lifestyle changes first before anti-obesity medication can be initiated  - Patient is interested in pursuing HealthyCORE-Intensive Lifestyle Intervention Program  - Initial weight loss goal of 5-10% weight loss for improved health  - Weight loss can improve patient's co-morbid conditions and/or prevent weight-related complications  Goals:  Do not skip any meals! Food log (ie ) www myfitnesspal com,sparkpeople  com,loseit com,calorieking  com,etc  baritastic (use skinnytaste  com, dietdoctor  com or smartphone marry Xoinka for recipes)  No sugary beverages  At least 64oz of water daily  Increase physical activity by 10 minutes daily  Gradually increase physical activity to a goal of 5 days per week for 30 minutes of MODERATE intensity PLUS 2 days per week of FULL BODY resistance training (use smartphone apps FitON, Home Workout, etc )    Total time spent: 30 min, with >50% face-to-face time spent counseling patient on nonsurgical interventions for the treatment of excess weight   Discussed in detail nonsurgical options including intensive lifestyle intervention program, very low-calorie diet program and conservative program   Discussed the role of weight loss medications  Counseled patient on diet behavior and exercise modification for weight loss  Follow up in approximately 3 months with Non-Surgical Physician/Advanced Practitioner  Subjective:   Chief Complaint   Patient presents with    Consult     Initial visit with alan       Patient ID: Karin Ramesh  is a 57 y o  female with excess weight/obesity here to pursue weight management  Previous notes and records have been reviewed  HPI  Wt Readings from Last 20 Encounters:   22 82 4 kg (181 lb 9 6 oz)   22 80 9 kg (178 lb 6 4 oz)   22 79 4 kg (175 lb)     /Excess Weight:  Severity: mild  Onset:   2018, menopause also   Modifiers:Keto diet lost 18lbs, Nutrisystem, no Rx   Contributing factors:  Insufficient time to make appropriate lifestyle changes, working long hours, did not get time to go to the gym  Associated symptoms: fatigue and decreased mobility  Goal to loose 20lbs  B-yogurt and blueberries or protein shake  3 cups in the morning with cream  S-  L-salad with protein  S-  D-meat and salad or veggies sometimes burger and fries  S-  Hydration: 8 glasses per day  Alcohol: 2 glasses wine 4 days   Smoking: none  Exercise: [facility]  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  Sleep: well  STOP ban/8    Past Medical History:   Diagnosis Date    Anxiety 2022    Cervical radiculopathy 2018    Cervical spondylosis without myelopathy 2018    History of colon polyps 2022    Hypertension, essential 10/16/2019    Major depressive disorder with single episode, in remission ([facility] [address] )     Mixed hyperlipidemia 2018    Non-seasonal allergic rhinitis     Osteopenia of multiple sites 2019    Other insomnia 2022    Overweight     Spinal stenosis of lumbar region with neurogenic claudication 10/22/2021    Vitamin D deficiency 12/08/2020     Past Surgical History:   Procedure Laterality Date    EGD  02/26/2018    LAMINECTOMY AND MICRODISCECTOMY LUMBAR SPINE       The following portions of the patient's history were reviewed and updated as appropriate: allergies, current medications, past family history, past medical history, past social history, past surgical history, and problem list     ROS:  Review of Systems   Constitutional: Negative for activity change  Fatigue  HENT: Negative for trouble swallowing  Respiratory: Negative for shortness of breath  Cardiovascular: Negative for chest pain, edema  Gastrointestinal: Negative for abdominal pain, nausea and vomiting,+ acid reflux, sometimes alternatingconstipation/diarrhea  Endocrine: negative for heat /cold intolerance  Genitourinary: Negative for difficulty urinating  Regular menses /Menopausal  Musculoskeletal: Negative for gait problem and myalgias  Feels more weakness in legs from back  Psychiatric/Behavioral: Negative for behavioral problems and suicidal ideas   + depression    Objective:  /82 (BP Location: Left arm, Patient Position: Sitting, Cuff Size: Standard)   Pulse 69   Temp (!) 97 °F (36 1 °C) (Tympanic)   Resp 16   Ht 5' 6" (1 676 m)   Wt 82 4 kg (181 lb 9 6 oz)   LMP 01/01/2015 (Within Years)   BMI 29 31 kg/m²   Constitutional: Well-developed, well-nourished and Overweight Body mass index is 29 31 kg/m²  Desmond Simple Alert, cooperative  HEENT: No conjunctival injection  No thyroid masses  Pulmonary: No increased work of breathing or signs of respiratory distress  Clear respiratory sounds  CV: Well-perfused, Regular rate and rhythm, no murmurs  Vascular: no peripheral edema  GI: Abdomen obese, Non-distended  Not tender  MSK: no sarcopenia noted   Neuro: Oriented to person, place and time  Normal Speech  Normal gait  Psych: Normal affect and mood   No Suicidal ideation or Homicidal thoughts  Labs and Imaging  Recent labs and imaging have been personally reviewed    No results found for: WBC, HGB, HCT, MCV, PLT  No results found for: NA, SODIUM, K, CL, CO2, ANIONGAP, AGAP, BUN, CREATININE, GLUC, GLUF, CALCIUM, AST, ALT, ALKPHOS, PROT, TP, BILITOT, TBILI, EGFR  Lab Results   Component Value Date    HGBA1C 5 4 10/28/2021     No results found for: [id number], TSH  No results found for: CHOLESTEROL  No results found for: HDL  No results found for: TRIG  No results found for: [address]

## 2022-08-29 ENCOUNTER — LAB (OUTPATIENT)
Dept: LAB | Facility: AMBULARY SURGERY CENTER | Age: 62
End: 2022-08-29
Payer: COMMERCIAL

## 2022-08-29 DIAGNOSIS — M85.89 OSTEOPENIA OF MULTIPLE SITES: ICD-10-CM

## 2022-08-29 DIAGNOSIS — E78.2 MIXED HYPERLIPIDEMIA: ICD-10-CM

## 2022-08-29 DIAGNOSIS — I10 HYPERTENSION, ESSENTIAL: ICD-10-CM

## 2022-08-29 DIAGNOSIS — Z11.4 SCREENING FOR HIV (HUMAN IMMUNODEFICIENCY VIRUS): ICD-10-CM

## 2022-08-29 DIAGNOSIS — R63.5 ABNORMAL WEIGHT GAIN: ICD-10-CM

## 2022-08-29 DIAGNOSIS — F41.9 ANXIETY: ICD-10-CM

## 2022-08-29 DIAGNOSIS — E55.9 VITAMIN D DEFICIENCY: ICD-10-CM

## 2022-08-29 DIAGNOSIS — Z13.6 SCREENING FOR CARDIOVASCULAR CONDITION: ICD-10-CM

## 2022-08-29 DIAGNOSIS — Z13.1 SCREENING FOR DIABETES MELLITUS: ICD-10-CM

## 2022-08-29 LAB
25(OH)D3 SERPL-MCNC: 33.5 NG/ML (ref 30–100)
ALBUMIN SERPL BCP-MCNC: 4.2 G/DL (ref 3.5–5)
ALP SERPL-CCNC: 62 U/L (ref 46–116)
ALT SERPL W P-5'-P-CCNC: 28 U/L (ref 12–78)
ANION GAP SERPL CALCULATED.3IONS-SCNC: 6 MMOL/L (ref 4–13)
AST SERPL W P-5'-P-CCNC: 13 U/L (ref 5–45)
BASOPHILS # BLD AUTO: 0.04 THOUSANDS/ΜL (ref 0–0.1)
BASOPHILS NFR BLD AUTO: 1 % (ref 0–1)
BILIRUB SERPL-MCNC: 0.71 MG/DL (ref 0.2–1)
BUN SERPL-MCNC: 19 MG/DL (ref 5–25)
CALCIUM SERPL-MCNC: 9.6 MG/DL (ref 8.3–10.1)
CHLORIDE SERPL-SCNC: 102 MMOL/L (ref 96–108)
CHOLEST SERPL-MCNC: 200 MG/DL
CO2 SERPL-SCNC: 28 MMOL/L (ref 21–32)
CREAT SERPL-MCNC: 0.79 MG/DL (ref 0.6–1.3)
EOSINOPHIL # BLD AUTO: 0.15 THOUSAND/ΜL (ref 0–0.61)
EOSINOPHIL NFR BLD AUTO: 3 % (ref 0–6)
ERYTHROCYTE [DISTWIDTH] IN BLOOD BY AUTOMATED COUNT: 12.5 % (ref 11.6–15.1)
EST. AVERAGE GLUCOSE BLD GHB EST-MCNC: 111 MG/DL
GFR SERPL CREATININE-BSD FRML MDRD: 79 ML/MIN/1.73SQ M
GLUCOSE P FAST SERPL-MCNC: 99 MG/DL (ref 65–99)
HBA1C MFR BLD: 5.5 %
HCT VFR BLD AUTO: 42.2 % (ref 34.8–46.1)
HDLC SERPL-MCNC: 77 MG/DL
HGB BLD-MCNC: 13.6 G/DL (ref 11.5–15.4)
IMM GRANULOCYTES # BLD AUTO: 0.01 THOUSAND/UL (ref 0–0.2)
IMM GRANULOCYTES NFR BLD AUTO: 0 % (ref 0–2)
INSULIN SERPL-ACNC: 7 MU/L (ref 3–25)
LDLC SERPL CALC-MCNC: 96 MG/DL (ref 0–100)
LDLC SERPL DIRECT ASSAY-MCNC: 99 MG/DL (ref 0–100)
LYMPHOCYTES # BLD AUTO: 1.46 THOUSANDS/ΜL (ref 0.6–4.47)
LYMPHOCYTES NFR BLD AUTO: 33 % (ref 14–44)
MCH RBC QN AUTO: 29.5 PG (ref 26.8–34.3)
MCHC RBC AUTO-ENTMCNC: 32.2 G/DL (ref 31.4–37.4)
MCV RBC AUTO: 92 FL (ref 82–98)
MONOCYTES # BLD AUTO: 0.55 THOUSAND/ΜL (ref 0.17–1.22)
MONOCYTES NFR BLD AUTO: 12 % (ref 4–12)
NEUTROPHILS # BLD AUTO: 2.27 THOUSANDS/ΜL (ref 1.85–7.62)
NEUTS SEG NFR BLD AUTO: 51 % (ref 43–75)
NONHDLC SERPL-MCNC: 123 MG/DL
NRBC BLD AUTO-RTO: 0 /100 WBCS
PLATELET # BLD AUTO: 265 THOUSANDS/UL (ref 149–390)
PMV BLD AUTO: 9.9 FL (ref 8.9–12.7)
POTASSIUM SERPL-SCNC: 4.2 MMOL/L (ref 3.5–5.3)
PROT SERPL-MCNC: 7.8 G/DL (ref 6.4–8.4)
RBC # BLD AUTO: 4.61 MILLION/UL (ref 3.81–5.12)
SODIUM SERPL-SCNC: 136 MMOL/L (ref 135–147)
TRIGL SERPL-MCNC: 137 MG/DL
TSH SERPL DL<=0.05 MIU/L-ACNC: 2.18 UIU/ML (ref 0.45–4.5)
WBC # BLD AUTO: 4.48 THOUSAND/UL (ref 4.31–10.16)

## 2022-08-29 PROCEDURE — 36415 COLL VENOUS BLD VENIPUNCTURE: CPT

## 2022-08-29 PROCEDURE — 83721 ASSAY OF BLOOD LIPOPROTEIN: CPT

## 2022-08-29 PROCEDURE — 80061 LIPID PANEL: CPT

## 2022-08-29 PROCEDURE — 82306 VITAMIN D 25 HYDROXY: CPT

## 2022-08-29 PROCEDURE — 83036 HEMOGLOBIN GLYCOSYLATED A1C: CPT

## 2022-08-29 PROCEDURE — 83525 ASSAY OF INSULIN: CPT

## 2022-08-29 PROCEDURE — 85025 COMPLETE CBC W/AUTO DIFF WBC: CPT

## 2022-08-29 PROCEDURE — 80053 COMPREHEN METABOLIC PANEL: CPT

## 2022-08-29 PROCEDURE — 84443 ASSAY THYROID STIM HORMONE: CPT

## 2022-08-29 NOTE — RESULT ENCOUNTER NOTE
Stable labs - will review with patient at upcoming appointment        The 10-year ASCVD risk score (Tamiko Diaz et al , 2013) is: 4 9%    Values used to calculate the score:      Age: 60 years      Sex: Female      Is Non- : No      Diabetic: No      Tobacco smoker: No      Systolic Blood Pressure: 555 mmHg      Is BP treated: Yes      HDL Cholesterol: 77 mg/dL      Total Cholesterol: 200 mg/dL

## 2022-08-30 ENCOUNTER — HOSPITAL ENCOUNTER (OUTPATIENT)
Dept: RADIOLOGY | Facility: CLINIC | Age: 62
Discharge: HOME | End: 2022-08-30
Payer: COMMERCIAL

## 2022-08-30 VITALS
DIASTOLIC BLOOD PRESSURE: 75 MMHG | HEART RATE: 69 BPM | OXYGEN SATURATION: 95 % | TEMPERATURE: 97.6 F | RESPIRATION RATE: 20 BRPM | SYSTOLIC BLOOD PRESSURE: 117 MMHG

## 2022-08-30 DIAGNOSIS — M51.16 INTERVERTEBRAL DISC DISORDER WITH RADICULOPATHY OF LUMBAR REGION: ICD-10-CM

## 2022-08-30 PROCEDURE — 64484 NJX AA&/STRD TFRM EPI L/S EA: CPT | Performed by: ANESTHESIOLOGY

## 2022-08-30 PROCEDURE — 64483 NJX AA&/STRD TFRM EPI L/S 1: CPT | Performed by: ANESTHESIOLOGY

## 2022-08-30 RX ORDER — METHYLPREDNISOLONE ACETATE 80 MG/ML
80 INJECTION, SUSPENSION INTRA-ARTICULAR; INTRALESIONAL; INTRAMUSCULAR; PARENTERAL; SOFT TISSUE ONCE
Status: COMPLETED | OUTPATIENT
Start: 2022-08-30 | End: 2022-08-30

## 2022-08-30 RX ORDER — 0.9 % SODIUM CHLORIDE 0.9 %
4 VIAL (ML) INJECTION ONCE
Status: COMPLETED | OUTPATIENT
Start: 2022-08-30 | End: 2022-08-30

## 2022-08-30 RX ORDER — BUPIVACAINE HCL/PF 2.5 MG/ML
2 VIAL (ML) INJECTION ONCE
Status: COMPLETED | OUTPATIENT
Start: 2022-08-30 | End: 2022-08-30

## 2022-08-30 RX ADMIN — BUPIVACAINE HYDROCHLORIDE 2 ML: 2.5 INJECTION, SOLUTION EPIDURAL; INFILTRATION; INTRACAUDAL at 14:11

## 2022-08-30 RX ADMIN — METHYLPREDNISOLONE ACETATE 80 MG: 80 INJECTION, SUSPENSION INTRA-ARTICULAR; INTRALESIONAL; INTRAMUSCULAR; PARENTERAL; SOFT TISSUE at 14:11

## 2022-08-30 RX ADMIN — Medication 4 ML: at 14:08

## 2022-08-30 RX ADMIN — IOHEXOL 1 ML: 300 INJECTION, SOLUTION INTRAVENOUS at 14:10

## 2022-08-30 NOTE — H&P
History of Present Illness:  The patient is a 60 y o  female who presents with complaints of right lower back and leg pain is here today for right L4-5 transforaminal epidural steroid injection    Patient Active Problem List   Diagnosis    Vitamin D deficiency    Spinal stenosis of lumbar region with neurogenic claudication    Osteopenia of multiple sites    Mixed hyperlipidemia    Hypertension, essential    Cervical spondylosis without myelopathy    Cervical radiculopathy    History of colon polyps    Anxiety    Other insomnia    Non-seasonal allergic rhinitis    Overweight    Major depressive disorder with single episode, in remission [facility])       Past Medical History:   Diagnosis Date    Anxiety 07/29/2022    Cervical radiculopathy 11/01/2018    Cervical spondylosis without myelopathy 11/01/2018    History of colon polyps 07/29/2022    Hypertension, essential 10/16/2019    Major depressive disorder with single episode, in remission ([facility] [address] )     Mixed hyperlipidemia 07/11/2018    Non-seasonal allergic rhinitis     Osteopenia of multiple sites 07/24/2019    Other insomnia 07/29/2022    Overweight     Spinal stenosis of lumbar region with neurogenic claudication 10/22/2021    Vitamin D deficiency 12/08/2020       Past Surgical History:   Procedure Laterality Date    EGD  02/26/2018    LAMINECTOMY AND MICRODISCECTOMY LUMBAR SPINE           Current Outpatient Medications:     atorvastatin (LIPITOR) 10 mg tablet, Take 10 mg by mouth daily at bedtime, Disp: , Rfl:     b complex vitamins capsule, Take 1 capsule by mouth daily, Disp: , Rfl:     calcium carbonate (OS-DAWNA) 600 MG tablet, Take 1,200 mg by mouth daily, Disp: , Rfl:     cholecalciferol (VITAMIN D3) 1,000 units tablet, Take 1,000 Units by mouth daily, Disp: , Rfl:     fluticasone (FLONASE) 50 mcg/act nasal spray, 2 sprays into each nostril daily OTC, Disp: , Rfl:     losartan (COZAAR) 50 mg tablet, Take 50 mg by mouth daily, Disp: , Rfl:     methocarbamol (ROBAXIN) 500 mg tablet, Take 1 tablet (500 mg total) by mouth 3 (three) times a day as needed for muscle spasms, Disp: 90 tablet, Rfl: 0    Multiple Vitamin (multivitamin) tablet, Take 1 tablet by mouth daily, Disp: , Rfl:     Omega-3 Fatty Acids (fish oil) 1,000 mg, Take 1,000 mg by mouth daily, Disp: , Rfl:     Probiotic Product (PROBIOTIC-10 PO), Take 1 tablet by mouth in the morning, Disp: , Rfl:     traZODone (DESYREL) 100 mg tablet, Take 50 mg by mouth daily at bedtime as needed for sleep, Disp: , Rfl:     zinc gluconate 50 mg tablet, Take 50 mg by mouth daily, Disp: , Rfl:     Current Facility-Administered Medications:     bupivacaine (PF) (MARCAINE) 0 25 % injection 2 mL, 2 mL, Epidural, Once, Ronal Kaur MD    iohexol (OMNIPAQUE) 300 mg/mL injection 1 mL, 1 mL, Epidural, Once, Ronal Kaur MD    lidocaine (PF) (XYLOCAINE-MPF) 2 % injection 4 mL, 4 mL, Infiltration, Once, Ronal Kaur MD    methylPREDNISolone acetate (DEPO-MEDROL) injection 80 mg, 80 mg, Epidural, Once, Ronal Kaur MD    sodium chloride (PF) 0 9 % injection 4 mL, 4 mL, Infiltration, Once, Ronal Kaur MD    No Known Allergies    Physical Exam:   Vitals:    08/30/22 1351   BP: 117/75   Pulse: 69   Resp: 20   Temp: 97 6 °F (36 4 °C)   SpO2: 95%     General: Awake, Alert, Oriented x 3, Mood and affect appropriate  Respiratory: Respirations even and unlabored  Cardiovascular: Peripheral pulses intact; no edema  Musculoskeletal Exam:  Right lower back and leg pain    ASA Score: 2    Patient/Chart Verification  Patient ID Verified: Verbal  ID Band Applied: No  Consents Confirmed: Procedural, To be obtained in the Pre-Procedure area  H&P( within 30 days) Verified: To be obtained in the Pre-Procedure area  Interval H&P(within 24 hr) Complete (required for Outpatients and Surgery Admit only): To be obtained in the Pre-Procedure area  Allergies Reviewed:  Yes  Anticoag/NSAID held?: NA  Currently on antibiotics?: No    Assessment:   1   Intervertebral disc disorder with radiculopathy of lumbar region        Plan: Right L4-5 TF DINORAH

## 2022-08-31 ENCOUNTER — TELEPHONE (OUTPATIENT)
Dept: BARIATRICS | Facility: CLINIC | Age: 62
End: 2022-08-31

## 2022-08-31 NOTE — TELEPHONE ENCOUNTER
Unable to leave voicemail for patient due to mailbox being full  Patient needs to reschedule appt with Dr Imtiaz Palomo on 11/22/22-provider will not be in office  Mychart msg was sent

## 2022-09-06 ENCOUNTER — TELEPHONE (OUTPATIENT)
Dept: PAIN MEDICINE | Facility: CLINIC | Age: 62
End: 2022-09-06

## 2022-09-07 ENCOUNTER — OFFICE VISIT (OUTPATIENT)
Dept: BARIATRICS | Facility: CLINIC | Age: 62
End: 2022-09-07
Payer: COMMERCIAL

## 2022-09-07 VITALS — BODY MASS INDEX: 28.38 KG/M2 | WEIGHT: 176.6 LBS | HEIGHT: 66 IN

## 2022-09-07 DIAGNOSIS — I10 HYPERTENSION, ESSENTIAL: ICD-10-CM

## 2022-09-07 DIAGNOSIS — M85.89 OSTEOPENIA OF MULTIPLE SITES: ICD-10-CM

## 2022-09-07 DIAGNOSIS — E66.3 OVERWEIGHT: ICD-10-CM

## 2022-09-07 DIAGNOSIS — E78.2 MIXED HYPERLIPIDEMIA: ICD-10-CM

## 2022-09-07 PROCEDURE — WMMFE WEIGHT MANAGEMENT MON FEE EMPLOYEE

## 2022-09-07 PROCEDURE — RECHECK

## 2022-09-07 NOTE — PROGRESS NOTES
Weight Management Medical Nutrition Assessment  Joi Cain is here today as a new start in the [facility] program   Today she weighs 176 6 lbs and has a goal weight of 155 - 160 lbs  She has been going through some personal stress recently and admits to emotional eating at times  She used to skip meals, but now makes it a point to not skip  She has started using my fitness pal to log, and has started swimming 3 - 4 times a week  Reivewed portion sizes, calorie needs and provider her with hand outs  Patient seen by Medical Provider in past 6 months:  yes  Requested to schedule appointment with Medical Provider: No      Anthropometric Measurements  Start Weight (#): 176 6  Current Weight (#): 176 6  TBW % Change from start weight:0%  Ideal Body Weight (#): 130   Goal Weight (#):155 - 160  Highest:181  Lowest:  148 - 150    Weight Loss History  Previous weight loss attempts: keto    Food and Nutrition Related History  Wake up:  9:30   Bed Time:4:45 am    Food Recall  Breakfast: 1/2 english muffin, banana    Snack:yogurt  Lunch:salad  Snack:apple  Dinner: lean meat, veg, starch  Snack: not now       Beverages: water  Volume of beverage intake: 60 - 64    Weekends: Same  Cravings: salty  Trouble area of day: night time    Frequency of Eating out: 3 times per week  Food restrictions:none  Cooking: self   Food Shopping: self    Physical Activity Intake  Activity:Swimming, walking and weights  Frequency:1/2 hour 3 days per week swimming  Physical limitations/barriers to exercise: spinal stenosis    Estimated Needs  Energy    B[employer] Energy Needs: BMR : 1761   1# loss weekly sedentary:  1147         1-2# loss weekly lightly active:888 - 1388  Maintenance calories for sedentary activity level: 1647  Protein:71 - 89      (1 2-1 5g/kg IBW)  Fluid: 68    (35mL/kg IBW)    Nutrition Diagnosis  Yes;     Overweight/obesity  related to Excess energy intake as evidenced by  BMI more than normative standard for age and sex (overweight 25-29  9)       Nutrition Intervention    Nutrition Prescription  Calories:1200 - 1400  Protein: 70 - 80  Fluid:70     Nutrition Education:    [facility]y [facility] Manual  Calorie controlled menu  Lean protein food choices  Healthy snack options  Food journaling tips      Nutrition Counseling:  Strategies: meal planning, portion sizes, healthy snack choices, hydration, fiber intake, protein intake, exercise, food journal      Monitoring and Evaluation:  Evaluation criteria:  Energy Intake  Meet protein needs  Maintain adequate hydration  Monitor weekly weight  Meal planning/preparation  Food journal   Decreased portions at mealtimes and snacks  Physical activity     Barriers to learning:none  Readiness to change: Action:  (Changing behavior)  Comprehension: good  Expected Compliance: good

## 2022-09-09 DIAGNOSIS — E78.2 MIXED HYPERLIPIDEMIA: Primary | ICD-10-CM

## 2022-09-09 RX ORDER — ATORVASTATIN CALCIUM 10 MG/1
10 TABLET, FILM COATED ORAL
Qty: 90 TABLET | Refills: 0 | Status: SHIPPED | OUTPATIENT
Start: 2022-09-09 | End: 2022-09-13 | Stop reason: SDUPTHER

## 2022-09-09 NOTE — TELEPHONE ENCOUNTER
Medication refill requested: atorvastatin 10mg  Last office visit: 8/1/22  Next office visit: 9/13/22  Last refilled: 2/28/22  Labs: Yes, describe: 8/29/22  Ordering Provider: Historical    Pharmacy (select pharmacy send RX to):   [address] ([place]) - [facility] [address]  [address] [address]  Phone: [phone] Fax: [phone]

## 2022-09-12 NOTE — PROGRESS NOTES
Assessment/Plan:  Problem List Items Addressed This Visit        Cardiovascular and Mediastinum    Hypertension, essential - Primary     Stable  Check blood pressure outside of office  Recommend lifestyle modifications  Relevant Medications    losartan (COZAAR) 50 mg tablet    Other Relevant Orders    Comprehensive metabolic panel       Musculoskeletal and Integument    Osteopenia of multiple sites     Next Dexa due 9/8/22 - overdue  No Rx previously  Low normal vitamin D - Recommend start multivitamin and over-the-counter vitamin D3 2000 - 3000 International Units daily  Relevant Orders    TSH, 3rd generation with Free T4 reflex       Other    Vitamin D deficiency     Low normal vitamin D - Recommend start multivitamin and over-the-counter vitamin D3 2000 - 3000 International Units daily  Relevant Orders    Comprehensive metabolic panel    Vitamin D 25 hydroxy    Mixed hyperlipidemia     Stable  Continue Lipitor 10mg QHS  Recommend lifestyle modifications  Relevant Medications    atorvastatin (LIPITOR) 10 mg tablet    Other Relevant Orders    Comprehensive metabolic panel    Lipid panel    LDL cholesterol, direct    Anxiety     Stable s Rx or counseling  Relevant Orders    TSH, 3rd generation with Free T4 reflex    Other insomnia     Stable on Trazodone 50mg QHS PRN  Relevant Medications    traZODone (DESYREL) 100 mg tablet    Other Relevant Orders    TSH, 3rd generation with Free T4 reflex    Overweight     Stable  Management per Weight Management  Recommend lifestyle modifications  Relevant Orders    TSH, 3rd generation with Free T4 reflex    Major depressive disorder with single episode, in remission ([facility] [address] )     Stable s Rx or counseling             Relevant Medications    traZODone (DESYREL) 100 mg tablet    Other Relevant Orders    TSH, 3rd generation with Free T4 reflex      Other Visit Diagnoses     Encounter for immunization Relevant Orders    Covid Vaccine at Practice    Encounter for screening mammogram for breast cancer        Relevant Orders    Mammo screening bilateral w 3d & cad           Return in 6 months (on 3/13/2023) for 6mo - HTN, HL, Anx/Dep, Insomnia, Vit D Def, Labs; COVID vaccine  Future Appointments   Date Time Provider Zachery Maru   9/21/2022  7:00 AM [facility]   9/28/2022  7:00 AM WEIGHT MGT [facility]   9/28/2022  8:00 AM Raúlmatt Fonseca, RD [facility]GT [facility]   10/5/2022  7:00 AM WEIGHT MGT CL[facility]EN [facility]   10/12/2022  7:00 AM WEIGHT MGT CL[facility]EN [facility]   10/17/2022  1:30 PM Oh Pendleton MD NEURO [facility]   10/19/2022  7:00 AM WEIGHT MGT CLASSR[facility]OM ALLEN [facility]   10/26/2022  7:00 AM WEIGHT MGT CLASSR[facility]OM ALLEN [facility]   11/2/2022  7:00 AM WEIGHT MGT CLASSR[facility]OM [facility]EN [facility]   11/9/2022  7:00 AM WEIGHT MGT CLASSROOM ALLEN [facility]   11/16/2022  7:00 AM WEIGHT MGT CLASSR[facility]OM [facility]EN [facility]   11/23/2022  7:00 AM WEIGHT MGT CLASSROOM ALLEN [facility]   11/30/2022  7:00 AM WEIGHT MGT CLASSR[facility]OM ALLEN [facility]   3/27/2023  8:00 AM Kira Stone DO [facility]        Subjective:     Kinga Ivan is a 60 y o  female who presents today for a follow-up on her chronic medical conditions  HPI:  Chief Complaint   Patient presents with    Follow-up     6 week follow up for hypertension     -- Above per clinical staff and reviewed  --      HPI      Today:    Return in about 6 weeks (around 9/12/2022) for F/U HTN, HL, Anx/Dep, Insomnia, Vit D Def, Labs         Overweight - Management per Weight Management per Dr Aron Mcknight  Next appt PRN  Watching diet, trying to limit 1200 calories daily    +Exercise - Swimming for 30 minutes, 3 days per week; Yoga for 1 hour, 1 day per week; Walking for 30 minutes, 3-4 days per week; Hiking for 2 hours, 2 times per month; Elliptical if not swimming; Weightlifting for 30 minutes, 1 day per week        HTN - On Losartan 50mg QD  D/C Ramipril due to ACE-I cough  No higher dose or other HTN Rx previously  Not checking BP on arm cuff outside of office        Hyperlipidemia - On Lipitor 10mg QHS  No higher dose or alternate statin previously        Anxiety / Depression - Stable  Good social supports  No SI/HI/AH/VH  Previously on Celexa (D/C due anorgasmia, weight gain), Ativan in   No counseling previously        Insomnia - On Trazodone 100mg 1/2 tab QHS PRN - using 1-2 times per week  Without Rx, has difficulty falling asleep - sometimes takes 2 hours  With Rx, can fall asleep within 20 minutes  Sleeps 8 5 hours c or s Rx  No higher dose or alternate sleep Rx previously           PHQ-2/9 Depression Screening    Little interest or pleasure in doing things: 0 - not at all  Feeling down, depressed, or hopeless: 0 - not at all  Trouble falling or staying asleep, or sleeping too much: 0 - not at all  Feeling tired or having little energy: 0 - not at all  Poor appetite or overeatin - not at all  Feeling bad about yourself - or that you are a failure or have let yourself or your family down: 0 - not at all  Trouble concentrating on things, such as reading the newspaper or watching television: 0 - not at all  Moving or speaking so slowly that other people could have noticed  Or the opposite - being so fidgety or restless that you have been moving around a lot more than usual: 0 - not at all  Thoughts that you would be better off dead, or of hurting yourself in some way: 0 - not at all  PHQ-9 Score: 0   PHQ-9 Interpretation: No or Minimal depression          FANNY-7 Flowsheet Screening    Flowsheet Row Most Recent Value   Over the last 2 weeks, how often have you been bothered by any of the following problems? Feeling nervous, anxious, or on edge 0   Not being able to stop or control worrying 0   Worrying too much about different things 0   Trouble relaxing 0   Being so restless that it is hard to sit still 0   Becoming easily annoyed or irritable 0   Feeling afraid as if something awful might happen 1   FANNY-7 Total Score 1             Vitamin D Deficiency - s/p Drisdol  Taking MVI, Calcium 100mg daily, and OTC Vitamin D 1000IU          Osteopenia - Next Dexa due 9/8/22 - overdue  No Rx previously  From previous note:    Controlled Substance Review     [place] PDMP or [place]  reviewed: No red flags were identified; safe to proceed with prescription           Overweight - Trying to watch diet, but would like to cut back on salt  Previosly had success c keto diet  +Exercise - Swimming for 30 minutes, 3 days per week; Yoga for 1 hour, 1 day per week; Walking for 30 minutes, 3-4 days per week; Hiking for 2 hours, 2 times per month; Elliptical if not swimming; Weightlifting for 30 minutes, 1 day per week        HTN - On Losartan 50mg QD  D/C Ramipril due to ACE-I cough  No higher dose or other HTN Rx previously  Checking BP on arm cuff outside of office 130/86        Hyperlipidemia - On Lipitor 10mg QHS  No higher dose or alternate statin previously        Anxiety / Depression - Stable  Good social supports  No SI/HI/AH/VH  Previously on Celexa (D/C due anorgasmia, weight gain), Ativan in 1995  No counseling previously        Insomnia - On Trazodone 100mg 1/2 tab QHS PRN - using 1-2 times per week  Without Rx, has difficulty falling asleep - sometimes takes 2 hours  With Rx, can fall asleep within 20 minutes  Sleeps 8 5 hours c or s Rx    No higher dose or alternate sleep Rx previously         PHQ-2/9 Depression Screening       Little interest or pleasure in doing things: 0 - not at all  Feeling down, depressed, or hopeless: 0 - not at all  Trouble falling or staying asleep, or sleeping too much: 0 - not at all  Feeling tired or having little energy: 1 - several days  Poor appetite or overeatin - not at all  Feeling bad about yourself - or that you are a failure or have let yourself or your family down: 0 - not at all  Trouble concentrating on things, such as reading the newspaper or watching television: 0 - not at all  Moving or speaking so slowly that other people could have noticed  Or the opposite - being so fidgety or restless that you have been moving around a lot more than usual: 0 - not at all  Thoughts that you would be better off dead, or of hurting yourself in some way: 0 - not at all  PHQ-2 Score: 0  PHQ-2 Interpretation: Negative depression screen  PHQ-9 Score: 1   PHQ-9 Interpretation: No or Minimal depression                    FANNY-7 Flowsheet Screening    Flowsheet Row Most Recent Value   Over the last 2 weeks, how often have you been bothered by any of the following problems?     Feeling nervous, anxious, or on edge 0   Not being able to stop or control worrying 0   Worrying too much about different things 0   Trouble relaxing 1   Being so restless that it is hard to sit still 0   Becoming easily annoyed or irritable 0   Feeling afraid as if something awful might happen 0   FANNY-7 Total Score 1          MDQ:  1, Asynchronous, No Problem     Vitamin D Deficiency - s/p Maddie  Taking MVI, Calcium 1200mg daily, and OTC Vitamin D        Osteopenia - Next Dexa due 22  No Rx previously        Lumbar Stenosis c Neurogenic Claudication L4-L5 / Lumbar Spondylosis / Myofascial Pain Syndrome / Cervical Disc Disorder with Radiculopathy of Mid-Cervical Region / Cervical Spondylosis - Management per Pain Management per Dr Barrera Found  Next appt PRN? Pending EMG B/L LE with Neuro Dr Cricket Harrison appointment 10/22  Rx Tizanidine 4mg QHS, but not taking as not needed yet          Chronic Sinus Problems / AR - Management per ENT Dr Sherley Castellanos  Next appt PRN   Stable on Flonase        H/O Colon Polyps - Next colonoscopy due 2/26/23        Reviewed:  Labs 8/29/22, Pain Management 7/27/22, Gyn 8/31/21, Weight Management 9/7/22     Sees Gyn - Pending appt c Dr Vishal Judd at [facility] 9/22 - overdue  Previously seeing [facility] and Helen Press Ms  Harvey Nails, CRNP        Sees Dentist q6 months  Sees Optho q2 years  The following portions of the patient's history were reviewed and updated as appropriate: allergies, current medications, past family history, past medical history, past social history, past surgical history and problem list       Review of Systems   Constitutional: Negative for appetite change, chills, diaphoresis, fatigue and fever  Respiratory: Negative for chest tightness and shortness of breath  Cardiovascular: Negative for chest pain  Gastrointestinal: Positive for abdominal pain (RLQ Twinge pain lasts for seconds, for years)  Negative for blood in stool, diarrhea, nausea and vomiting  Genitourinary: Negative for dysuria  Musculoskeletal: Positive for back pain          Current Outpatient Medications   Medication Sig Dispense Refill    atorvastatin (LIPITOR) 10 mg tablet Take 1 tablet (10 mg total) by mouth daily at bedtime 90 tablet 0    b complex vitamins capsule Take 1 capsule by mouth daily      calcium gluconate 500 mg tablet Take 1,000 mg by mouth daily      cholecalciferol (VITAMIN D3) 1,000 units tablet Take 1,000 Units by mouth daily      fluticasone (FLONASE) 50 mcg/act nasal spray 2 sprays into each nostril daily OTC      losartan (COZAAR) 50 mg tablet Take 1 tablet (50 mg total) by mouth daily 90 tablet 1    methocarbamol (ROBAXIN) 500 mg tablet Take 1 tablet (500 mg total) by mouth 3 (three) times a day as needed for muscle spasms 90 tablet 0    montelukast (SINGULAIR) 10 mg tablet Take 10 mg by mouth daily at bedtime      Multiple Vitamin (multivitamin) tablet Take 1 tablet by mouth daily      Omega-3 Fatty Acids (fish oil) 1,000 mg Take 1,000 mg by mouth daily      Probiotic Product (PROBIOTIC-10 PO) Take 1 tablet by mouth in the morning      traZODone (DESYREL) 100 mg tablet Take 0 5 tablets (50 mg total) by mouth daily at bedtime as needed for sleep 90 tablet 0    zinc gluconate 50 mg tablet Take 50 mg by mouth daily       No current facility-administered medications for this visit  Objective:  /84 (BP Location: Left arm, Patient Position: Sitting, Cuff Size: Standard)   Pulse 69   Temp (!) 97 3 °F (36 3 °C)   Ht 5' 6" (1 676 m)   Wt 79 4 kg (175 lb)   LMP 01/01/2015 (Within Years)   SpO2 98%   BMI 28 25 kg/m²    Wt Readings from Last 3 Encounters:   09/14/22 80 7 kg (178 lb)   09/13/22 79 4 kg (175 lb)   09/07/22 80 1 kg (176 lb 9 6 oz)      BP Readings from Last 3 Encounters:   09/13/22 124/84   08/30/22 117/75   08/22/22 126/82          Physical Exam  Vitals and nursing note reviewed  Constitutional:       Appearance: Normal appearance  She is well-developed  HENT:      Head: Normocephalic and atraumatic  Eyes:      Conjunctiva/sclera: Conjunctivae normal    Neck:      Thyroid: No thyromegaly  Cardiovascular:      Rate and Rhythm: Normal rate and regular rhythm  Pulses: Normal pulses  Heart sounds: Normal heart sounds  Pulmonary:      Effort: Pulmonary effort is normal       Breath sounds: Normal breath sounds  Abdominal:      General: Bowel sounds are normal  There is no distension  Palpations: Abdomen is soft  There is no mass  Tenderness: There is no abdominal tenderness  There is no right CVA tenderness, left CVA tenderness, guarding or rebound  Musculoskeletal:         General: No swelling or tenderness  Cervical back: Neck supple  Right lower leg: No edema  Left lower leg: No edema  Neurological:      General: No focal deficit present  Mental Status: She is alert and oriented to person, place, and time     Psychiatric:         Mood and Affect: Mood normal  Behavior: Behavior normal          Thought Content: Thought content normal          Judgment: Judgment normal          Lab Results:      Lab Results   Component Value Date    WBC 4 48 08/29/2022    HGB 13 6 08/29/2022    HCT 42 2 08/29/2022     08/29/2022    TRIG 137 08/29/2022    HDL 77 08/29/2022    LDLDIRECT 99 08/29/2022    ALT 28 08/29/2022    AST 13 08/29/2022    K 4 2 08/29/2022     08/29/2022    CREATININE 0 79 08/29/2022    BUN 19 08/29/2022    CO2 28 08/29/2022    GLUF 99 08/29/2022    HGBA1C 5 5 08/29/2022     No results found for: URICACID  Invalid input(s): BASENAME Vitamin D    FL spine and pain procedure    Result Date: 8/30/2022  Narrative: Pre-procedure Diagnosis: 1  Intervertebral disc disorder with radiculopathy of lumbar region  Post-procedure Diagnosis: 1  Intervertebral disc disorder with radiculopathy of lumbar region  Procedure Title(s):  1  Right L4 transforaminal epidural steroid injection 2  Right L5 transforaminal epidural steroid injection Attending Surgeon:   Christine Qiu MD Anesthesia:   Local Indications: The patient is a 60y o  year-old female with a diagnosis of 1  Intervertebral disc disorder with radiculopathy of lumbar region   The patient's history and physical exam were reviewed  The risks, benefits and alternatives to the procedure were discussed, and all questions were answered to the patient's satisfaction  The patient agreed to proceed, and written informed consent was obtained  Procedure in Detail: The patient was brought into the procedure room and placed in the prone position on the fluoroscopy table  The area of the lumbar spine was prepped with chloraprep solution  then draped in a sterile manner  The L4 vertebral body was identified with AP fluoroscopy  An oblique view to the right was obtained to better visualize the inferior junction of the pedicle and transverse process  The 6 o'clock position of the pedicle was marked and identified   The skin and subcutaneous tissues in the area were anesthetized with 1% lidocaine  A 22-gauge, 5 inch needle was directed toward the targeted point under fluoroscopy until bone was contacted  The needle was then walked inferiorly until the neural foramen was entered  A lateral fluoroscopic view was then used to place the needle tip at the 10 o'clock position of the foramen  The same procedure was repeated for the right L5 level  Negative aspiration was confirmed, and 1 ml Omnipaque 300 was injected at each level  Appropriate neurograms were observed under AP fluoroscopy  Digital subtraction angiography was performed showing no vascular uptake and appropriate spread in the epidural space  Then, after negative aspiration, a solution consisting of 1 mL 0 25% bupivacaine and 0 5 mL depo-medrol (80mg/mL) was easily injected at each level  The needles were removed with a 1% lidocaine flush  The patient's back was cleaned and a bandage was placed over the needle insertion points  Disposition: The patient tolerated the procedure well, and there were no apparent complications  The patient was taken to the recovery area where written discharge instructions for the procedure were given   Estimated Blood Loss: None Specimens Obtained: N/A            POCT Labs

## 2022-09-13 ENCOUNTER — OFFICE VISIT (OUTPATIENT)
Dept: FAMILY MEDICINE CLINIC | Facility: CLINIC | Age: 62
End: 2022-09-13
Payer: COMMERCIAL

## 2022-09-13 VITALS
BODY MASS INDEX: 28.12 KG/M2 | TEMPERATURE: 97.3 F | HEIGHT: 66 IN | DIASTOLIC BLOOD PRESSURE: 84 MMHG | WEIGHT: 175 LBS | OXYGEN SATURATION: 98 % | SYSTOLIC BLOOD PRESSURE: 124 MMHG | HEART RATE: 69 BPM

## 2022-09-13 DIAGNOSIS — M85.89 OSTEOPENIA OF MULTIPLE SITES: ICD-10-CM

## 2022-09-13 DIAGNOSIS — F41.9 ANXIETY: ICD-10-CM

## 2022-09-13 DIAGNOSIS — E55.9 VITAMIN D DEFICIENCY: ICD-10-CM

## 2022-09-13 DIAGNOSIS — I10 HYPERTENSION, ESSENTIAL: Primary | ICD-10-CM

## 2022-09-13 DIAGNOSIS — F32.5 MAJOR DEPRESSIVE DISORDER WITH SINGLE EPISODE, IN REMISSION (HCC): ICD-10-CM

## 2022-09-13 DIAGNOSIS — Z23 ENCOUNTER FOR IMMUNIZATION: ICD-10-CM

## 2022-09-13 DIAGNOSIS — E66.3 OVERWEIGHT: ICD-10-CM

## 2022-09-13 DIAGNOSIS — G47.09 OTHER INSOMNIA: ICD-10-CM

## 2022-09-13 DIAGNOSIS — Z12.31 ENCOUNTER FOR SCREENING MAMMOGRAM FOR BREAST CANCER: ICD-10-CM

## 2022-09-13 DIAGNOSIS — E78.2 MIXED HYPERLIPIDEMIA: ICD-10-CM

## 2022-09-13 PROCEDURE — 99214 OFFICE O/P EST MOD 30 MIN: CPT | Performed by: FAMILY MEDICINE

## 2022-09-13 PROCEDURE — 3725F SCREEN DEPRESSION PERFORMED: CPT | Performed by: FAMILY MEDICINE

## 2022-09-13 RX ORDER — CALCIUM GLUCONATE 45(500) MG
1000 TABLET ORAL DAILY
COMMUNITY

## 2022-09-13 RX ORDER — MONTELUKAST SODIUM 10 MG/1
10 TABLET ORAL
COMMUNITY

## 2022-09-13 RX ORDER — ATORVASTATIN CALCIUM 10 MG/1
10 TABLET, FILM COATED ORAL
Qty: 90 TABLET | Refills: 0 | Status: SHIPPED | OUTPATIENT
Start: 2022-09-13

## 2022-09-13 RX ORDER — TRAZODONE HYDROCHLORIDE 100 MG/1
50 TABLET ORAL
Qty: 90 TABLET | Refills: 0 | Status: SHIPPED | OUTPATIENT
Start: 2022-09-13

## 2022-09-13 RX ORDER — LOSARTAN POTASSIUM 50 MG/1
50 TABLET ORAL DAILY
Qty: 90 TABLET | Refills: 1 | Status: SHIPPED | OUTPATIENT
Start: 2022-09-13 | End: 2023-09-13

## 2022-09-13 NOTE — PATIENT INSTRUCTIONS
Low normal vitamin D - Recommend start multivitamin and over-the-counter vitamin D3 2000 - 3000 International Units daily  Cholesterol and Your Health   AMBULATORY CARE:   Cholesterol  is a waxy, fat-like substance  Your body uses cholesterol to make hormones and new cells, and to protect nerves  Cholesterol is made by your body  It also comes from certain foods you eat, such as meat and dairy products  Your healthcare provider can help you set goals for your cholesterol levels  He or she can help you create a plan to meet your goals  Cholesterol level goals: Your cholesterol level goals depend on your risk for heart disease, your age, and your other health conditions  The following are general guidelines: Total cholesterol  includes low-density lipoprotein (LDL), high-density lipoprotein (HDL), and triglyceride levels  The total cholesterol level should be lower than 200 mg/dL and is best at about 150 mg/dL  LDL cholesterol  is called bad cholesterol  because it forms plaque in your arteries  As plaque builds up, your arteries become narrow, and less blood flows through  When plaque decreases blood flow to your heart, you may have chest pain  If plaque completely blocks an artery that brings blood to your heart, you may have a heart attack  Plaque can break off and form blood clots  Blood clots may block arteries in your brain and cause a stroke  The level should be less than 130 mg/dL and is best at about 100 mg/dL  HDL cholesterol  is called good cholesterol  because it helps remove LDL cholesterol from your arteries  It does this by attaching to LDL cholesterol and carrying it to your liver  Your liver breaks down LDL cholesterol so your body can get rid of it  High levels of HDL cholesterol can help prevent a heart attack and stroke  Low levels of HDL cholesterol can increase your risk for heart disease, heart attack, and stroke  The level should be 60 mg/dL or higher      Triglycerides  are a type of fat that store energy from foods you eat  High levels of triglycerides also cause plaque buildup  This can increase your risk for a heart attack or stroke  If your triglyceride level is high, your LDL cholesterol level may also be high  The level should be less than 150 mg/dL  Any of the following can increase your risk for high cholesterol:   Smoking cigarettes    Being overweight or obese, or not getting enough exercise    Drinking large amounts of alcohol    A medical condition such as hypertension (high blood pressure) or diabetes    Certain genes passed from your parents to you    Age older than 65 years    What you need to know about having your cholesterol levels checked: Adults 21to 39years of age should have their cholesterol levels checked every 4 to 6 years  Adults 45 years or older should have their cholesterol checked every 1 to 2 years  You may need your cholesterol checked more often, or at a younger age, if you have risk factors for heart disease  You may also need to have your cholesterol checked more often if you have other health conditions, such as diabetes  Blood tests are used to check cholesterol levels  Blood tests measure your levels of triglycerides, LDL cholesterol, and HDL cholesterol  How healthy fats affect your cholesterol levels:  Healthy fats, also called unsaturated fats, help lower LDL cholesterol and triglyceride levels  Healthy fats include the following:  Monounsaturated fats  are found in foods such as olive oil, canola oil, avocado, nuts, and olives  Polyunsaturated fats,  such as omega 3 fats, are found in fish, such as salmon, trout, and tuna  They can also be found in plant foods such as flaxseed, walnuts, and soybeans  How unhealthy fats affect your cholesterol levels:  Unhealthy fats increase LDL cholesterol and triglyceride levels   They are found in foods high in cholesterol, saturated fat, and trans fat:  Cholesterol  is found in eggs, dairy, and meat     Saturated fat  is found in butter, cheese, ice cream, whole milk, and coconut oil  Saturated fat is also found in meat, such as sausage, hot dogs, and bologna  Trans fat  is found in liquid oils and is used in fried and baked foods  Foods that contain trans fats include chips, crackers, muffins, sweet rolls, microwave popcorn, and cookies  Treatment  for high cholesterol will also decrease your risk of heart disease, heart attack, and stroke  Treatment may include any of the following:  Lifestyle changes  may include food, exercise, weight loss, and quitting smoking  You may also need to decrease the amount of alcohol you drink  Your healthcare provider will want you to start with lifestyle changes  Other treatment may be added if lifestyle changes are not enough  Your healthcare provider may recommend you work with a team to manage hyperlipidemia  The team may include medical experts such as a dietitian, an exercise or physical therapist, and a behavior therapist  Your family members may be included in helping you create lifestyle changes  Medicines  may be given to lower your LDL cholesterol, triglyceride levels, or total cholesterol level  You may need medicines to lower your cholesterol if any of the following is true:    You have a history of stroke, TIA, unstable angina, or a heart attack  Your LDL cholesterol level is 190 mg/dL or higher  You are age 36 to 76 years, have diabetes or heart disease risk factors, and your LDL cholesterol is 70 mg/dL or higher  Supplements  include fish oil, red yeast rice, and garlic  Fish oil may help lower your triglyceride and LDL cholesterol levels  It may also increase your HDL cholesterol level  Red yeast rice may help decrease your total cholesterol level and LDL cholesterol level  Garlic may help lower your total cholesterol level  Do not take any supplements without talking to your healthcare provider      Food changes you can make to lower your cholesterol levels:  A dietitian can help you create a healthy eating plan  He or she can show you how to read food labels and choose foods low in saturated fat, trans fats, and cholesterol  Decrease the total amount of fat you eat  Choose lean meats, fat-free or 1% fat milk, and low-fat dairy products, such as yogurt and cheese  Try to limit or avoid red meats  Limit or do not eat fried foods or baked goods, such as cookies  Replace unhealthy fats with healthy fats  Cook foods in olive oil or canola oil  Choose soft margarines that are low in saturated fat and trans fat  Seeds, nuts, and avocados are other examples of healthy fats  Eat foods with omega-3 fats  Examples include salmon, tuna, mackerel, walnuts, and flaxseed  Eat fish 2 times per week  Pregnant women should not eat fish that have high levels of mercury, such as shark, swordfish, and meggan mackerel  Increase the amount of high-fiber foods you eat  High-fiber foods can help lower your LDL cholesterol  Aim to get between 20 and 30 grams of fiber each day  Fruits and vegetables are high in fiber  Eat at least 5 servings each day  Other high-fiber foods are whole-grain or whole-wheat breads, pastas, or cereals, and brown rice  Eat 3 ounces of whole-grain foods each day  Increase fiber slowly  You may have abdominal discomfort, bloating, and gas if you add fiber to your diet too quickly  Eat healthy protein foods  Examples include low-fat dairy products, skinless chicken and turkey, fish, and nuts  Limit foods and drinks that are high in sugar  Your dietitian or healthcare provider can help you create daily limits for high-sugar foods and drinks  The limit may be lower if you have diabetes or another health condition  Limits can also help you lose weight if needed  Lifestyle changes you can make to lower your cholesterol levels:   Maintain a healthy weight    Ask your healthcare provider what a healthy weight is for you  Ask him or her to help you create a weight loss plan if needed  Weight loss can decrease your total cholesterol and triglyceride levels  Weight loss may also help keep your blood pressure at a healthy level  Be physically active throughout the day  Physical activity, such as exercise, can help lower your total cholesterol level and maintain a healthy weight  Physical activity can also help increase your HDL cholesterol level  Work with your healthcare provider to create an program that is right for you  Get at least 30 to 40 minutes of moderate physical activity most days of the week  Examples of exercise include brisk walking, swimming, or biking  Also include strength training at least 2 times each week  Your healthcare providers can help you create a physical activity plan  Do not smoke  Nicotine and other chemicals in cigarettes and cigars can raise your cholesterol levels  Ask your healthcare provider for information if you currently smoke and need help to quit  E-cigarettes or smokeless tobacco still contain nicotine  Talk to your healthcare provider before you use these products  Limit or do not drink alcohol  Alcohol can increase your triglyceride levels  Ask your healthcare provider before you drink alcohol  Ask how much is okay for you to drink in 24 hours or 1 week  Follow up with your doctor as directed:  Write down your questions so you remember to ask them during your visits  © Copyright [employer] 2022 Information is for End User's use only and may not be sold, redistributed or otherwise used for commercial purposes  All illustrations and images included in CareNotes® are the copyrighted property of [employer]  or Therese Campos  The above information is an  only  It is not intended as medical advice for individual conditions or treatments   Talk to your doctor, nurse or pharmacist before following any medical regimen to see if it is safe and effective for you

## 2022-09-14 ENCOUNTER — CLINICAL SUPPORT (OUTPATIENT)
Dept: BARIATRICS | Facility: CLINIC | Age: 62
End: 2022-09-14

## 2022-09-14 VITALS — WEIGHT: 178 LBS | HEIGHT: 66 IN | BODY MASS INDEX: 28.61 KG/M2

## 2022-09-14 DIAGNOSIS — R63.5 ABNORMAL WEIGHT GAIN: Primary | ICD-10-CM

## 2022-09-14 NOTE — ASSESSMENT & PLAN NOTE
Low normal vitamin D - Recommend start multivitamin and over-the-counter vitamin D3 2000 - 3000 International Units daily

## 2022-09-14 NOTE — ASSESSMENT & PLAN NOTE
Next Dexa due 9/8/22 - overdue  No Rx previously  Low normal vitamin D - Recommend start multivitamin and over-the-counter vitamin D3 2000 - 3000 International Units daily

## 2022-09-21 ENCOUNTER — CLINICAL SUPPORT (OUTPATIENT)
Dept: BARIATRICS | Facility: CLINIC | Age: 62
End: 2022-09-21

## 2022-09-21 VITALS — WEIGHT: 174.2 LBS | BODY MASS INDEX: 28 KG/M2 | HEIGHT: 66 IN

## 2022-09-21 DIAGNOSIS — R63.5 ABNORMAL WEIGHT GAIN: Primary | ICD-10-CM

## 2022-09-28 ENCOUNTER — CLINICAL SUPPORT (OUTPATIENT)
Dept: BARIATRICS | Facility: CLINIC | Age: 62
End: 2022-09-28

## 2022-09-28 ENCOUNTER — OFFICE VISIT (OUTPATIENT)
Dept: BARIATRICS | Facility: CLINIC | Age: 62
End: 2022-09-28

## 2022-09-28 VITALS — WEIGHT: 172.8 LBS | BODY MASS INDEX: 27.77 KG/M2 | HEIGHT: 66 IN

## 2022-09-28 DIAGNOSIS — R63.5 ABNORMAL WEIGHT GAIN: Primary | ICD-10-CM

## 2022-09-28 PROCEDURE — RECHECK

## 2022-09-28 NOTE — PROGRESS NOTES
Weight Management Medical Nutrition Assessment  Tammie Brown had a virtual today for 2 week follow-up in the [facility] program   Today she weighs 172 8 lbs - She was in class this morning and was weighed  She has been logging her food diligently and staying around 1200 - 1400 hundred calories  She is eating more fruits and vegetables, and has increased her water  She continues to swim 3 -4 times a week, and is walking most days  She is going to [place] to visit her daughter, and has a plan as to having a shake and a bar during the day, and then have a sensible dinner       Patient seen by Medical Provider in past 6 months:  yes  Requested to schedule appointment with Medical Provider: No        Anthropometric Measurements  Start Weight (#): 176 6  Current Weight (#): 172 8  TBW % Change from start weight:2%  Ideal Body Weight (#): 130   Goal Weight (#):155 - 160  Highest:181  Lowest:  148 - 150     Weight Loss History  Previous weight loss attempts: keto     Food and Nutrition Related History  Wake up:  9:30              Bed Time:4:45 am     Food Recall  Breakfast: 1/2 english muffin, banana              Snack:yogurt  Lunch:salad  Snack:apple  Dinner: lean meat, veg, starch  Snack: not now         Beverages: water  Volume of beverage intake: 60 - 64     Weekends: Same  Cravings: salty  Trouble area of day: night time     Frequency of Eating out: 3 times per week  Food restrictions:none  Cooking: self   Food Shopping: self     Physical Activity Intake  Activity:Swimming, walking and weights  Frequency:1/2 hour 3 days per week swimming  Physical limitations/barriers to exercise: spinal stenosis     Estimated Needs  Energy     [employer] Energy Needs:  BMR : 1202   1# loss weekly sedentary:  1147         1-2# loss weekly lightly active:888 - 1388  Maintenance calories for sedentary activity level: 1647  Protein:71 - 89      (1 2-1 5g/kg IBW)  Fluid: 68    (35mL/kg IBW)     Nutrition Diagnosis  Yes; Overweight/obesity  related to Excess energy intake as evidenced by  BMI more than normative standard for age and sex (overweight 25-29  9)     Nutrition Intervention     Nutrition Prescription  Calories:1200 - 1400  Protein: 70 - 80  Fluid:70      Nutrition Education:    Healthy Core Manual  Calorie controlled menu  Lean protein food choices  Healthy snack options  Food journaling tips        Nutrition Counseling:  Strategies: meal planning, portion sizes, healthy snack choices, hydration, fiber intake, protein intake, exercise, food journal        Monitoring and Evaluation:  Evaluation criteria:  Energy Intake  Meet protein needs  Maintain adequate hydration  Monitor weekly weight  Meal planning/preparation  Food journal   Decreased portions at mealtimes and snacks  Physical activity      Barriers to learning:none  Readiness to change: Action:  (Changing behavior)  Comprehension: good  Expected Compliance: good

## 2022-10-12 ENCOUNTER — CLINICAL SUPPORT (OUTPATIENT)
Dept: BARIATRICS | Facility: CLINIC | Age: 62
End: 2022-10-12

## 2022-10-12 VITALS — BODY MASS INDEX: 28.16 KG/M2 | WEIGHT: 175.2 LBS | HEIGHT: 66 IN

## 2022-10-12 DIAGNOSIS — R63.5 ABNORMAL WEIGHT GAIN: Primary | ICD-10-CM

## 2022-10-12 PROCEDURE — RECHECK

## 2022-10-26 ENCOUNTER — CLINICAL SUPPORT (OUTPATIENT)
Dept: BARIATRICS | Facility: CLINIC | Age: 62
End: 2022-10-26

## 2022-10-26 VITALS — HEIGHT: 66 IN | BODY MASS INDEX: 27.35 KG/M2 | WEIGHT: 170.2 LBS

## 2022-10-26 DIAGNOSIS — R63.5 ABNORMAL WEIGHT GAIN: Primary | ICD-10-CM

## 2022-11-01 ENCOUNTER — OFFICE VISIT (OUTPATIENT)
Dept: OBGYN CLINIC | Facility: CLINIC | Age: 62
End: 2022-11-01

## 2022-11-01 VITALS
HEIGHT: 66 IN | DIASTOLIC BLOOD PRESSURE: 72 MMHG | WEIGHT: 170 LBS | SYSTOLIC BLOOD PRESSURE: 124 MMHG | BODY MASS INDEX: 27.32 KG/M2

## 2022-11-01 DIAGNOSIS — M85.89 OSTEOPENIA OF MULTIPLE SITES: ICD-10-CM

## 2022-11-01 DIAGNOSIS — Z12.31 ENCOUNTER FOR SCREENING MAMMOGRAM FOR MALIGNANT NEOPLASM OF BREAST: ICD-10-CM

## 2022-11-01 DIAGNOSIS — N95.8 GENITOURINARY SYNDROME OF MENOPAUSE: ICD-10-CM

## 2022-11-01 DIAGNOSIS — Z01.419 WELL WOMAN EXAM WITH ROUTINE GYNECOLOGICAL EXAM: Primary | ICD-10-CM

## 2022-11-01 NOTE — PROGRESS NOTES
ASSESSMENT & PLAN:   Diagnoses and all orders for this visit:    Well woman exam with routine gynecological exam    Encounter for screening mammogram for malignant neoplasm of breast  -     Mammo screening bilateral w 3d & cad; Future    Osteopenia of multiple sites    Genitourinary syndrome of menopause  -     estrogens, conjugated (Premarin) vaginal cream; Insert 1 g into the vagina 2 (two) times a week At bedtime          The following were reviewed in today's visit: ASCCP guidelines, breast self exam, mammography screening ordered, menopause, osteoporosis, adequate intake of calcium and vitamin D, exercise and healthy diet  Discussed silicone based lubricant, coconut oil with sex  replens and premarin for maintenance    Patient to return to office in yearly for annual exam      All questions have been answered to her satisfaction  CC:  Annual Gynecologic Examination  Chief Complaint   Patient presents with   • New Patient Visit     NP here today for her annual exam pap w/hpv 2020 neg/neg  3d mammogram 2022 wnl  dexa 2021 osteopenia of spine/Lft hip         HPI: [facility] is a 60 y o  [id number] who presents for annual gynecologic examination  She has the following concerns:  Vaginal dryness with intercourse and pain with sex  Has been using replens with sex  Tried estrogen 1 time a year ago but got nervous and didn't use it again         Health Maintenance:    Exercise: swimming 3x/wk, weight lifting 1x/wk, walking   Breast exams/breast awareness: yes  Diet: working with [employer]  Last mammogram: 2022 birads-2  Colorectal cancer screenin  DXA: 2021    Past Medical History:   Diagnosis Date   • Anxiety 2022   • Cervical radiculopathy 2018   • Cervical spondylosis without myelopathy 2018   • History of colon polyps 2022   • Hypertension, essential 10/16/2019   • Mixed hyperlipidemia 2018   • Non-seasonal allergic rhinitis    • Osteopenia of multiple sites 07/24/2019   • Other insomnia 07/29/2022   • Overweight    • Spinal stenosis of lumbar region with neurogenic claudication 10/22/2021   • Vitamin D deficiency 12/08/2020       Past Surgical History:   Procedure Laterality Date   • EGD  02/26/2018   • LAMINECTOMY AND MICRODISCECTOMY LUMBAR SPINE         Past OB/Gyn History:   Patient's last menstrual period was 01/01/2015 (within years)  Menopausal status: postmenopausal  Menopausal symptoms: dryness    Last Pap: 8/2020 : NILM neg HR HPV  History of abnormal Pap smear: no    Patient is currently sexually active  Family History  Family History   Adopted: Yes   Problem Relation Age of Onset   • No Known Problems Mother         Estranged   • No Known Problems Father         Estranged   • No Known Problems Daughter    • No Known Problems Daughter    • No Known Problems Son    • Asperger's syndrome Son    • Autism spectrum disorder Son    • Bipolar disorder Son    • Anxiety disorder Son        Family history of uterine or ovarian cancer: no  Family history of breast cancer: no  Family history of colon cancer: no    Social History:  Social History     Socioeconomic History   • Marital status: /Civil Union     Spouse name: Not on file   • Number of children: 4   • Years of education: Not on file   • Highest education level: Not on file   Occupational History   • Occupation: Director of Nursing for Acute Care     Employer: [employer] EMPLOYEES   Tobacco Use   • Smoking status: Never Smoker   • Smokeless tobacco: Never Used   Vaping Use   • Vaping Use: Never used   Substance and Sexual Activity   • Alcohol use:  Yes     Alcohol/week: 8 0 standard drinks     Types: 8 Glasses of wine per week   • Drug use: Never   • Sexual activity: Yes     Partners: Male     Birth control/protection: Male Sterilization, Post-menopausal     Comment: Vasectomy   Other Topics Concern   • Not on file   Social History Narrative     Lives with  Jim Donohue, Adult Son Zoe    [address] [facility], , 2 Daughters    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[id number] [facility] [address] Determinants of Health     Financial Resource Strain: Not on file   Food Insecurity: Not on file   Transportation Needs: Not on file   Physical Activity: Not on file   Stress: Not on file   Social Connections: Not on file   Intimate Partner Violence: Not on file   Housing Stability: Not on file     Domestic violence screen: negative    Allergies:  No Known Allergies    Medications:    Current Outpatient Medications:   •  atorvastatin (LIPITOR) 10 mg tablet, Take 1 tablet (10 mg total) by mouth daily at bedtime, Disp: 90 tablet, Rfl: 0  •  b complex vitamins capsule, Take 1 capsule by mouth daily, Disp: , Rfl:   •  calcium gluconate 500 mg tablet, Take 1,000 mg by mouth daily, Disp: , Rfl:   •  cholecalciferol (VITAMIN D3) 1,000 units tablet, Take 1,000 Units by mouth daily, Disp: , Rfl:   •  [START ON 11/3/2022] estrogens, conjugated (Premarin) vaginal cream, Insert 1 g into the vagina 2 (two) times a week At bedtime, Disp: 30 g, Rfl: 2  •  fluticasone (FLONASE) 50 mcg/act nasal spray, 2 sprays into each nostril daily OTC, Disp: , Rfl:   •  losartan (COZAAR) 50 mg tablet, Take 1 tablet (50 mg total) by mouth daily, Disp: 90 tablet, Rfl: 1  •  methocarbamol (ROBAXIN) 500 mg tablet, Take 1 tablet (500 mg total) by mouth 3 (three) times a day as needed for muscle spasms, Disp: 90 tablet, Rfl: 0  •  montelukast (SINGULAIR) 10 mg tablet, Take 10 mg by mouth daily at bedtime, Disp: , Rfl:   •  Multiple Vitamin (multivitamin) tablet, Take 1 tablet by mouth daily, Disp: , Rfl:   •  Omega-3 Fatty Acids (fish oil) 1,000 mg, Take 1,000 mg by mouth daily, Disp: , Rfl:   •  Probiotic Product (PROBIOTIC-10 PO), Take 1 tablet by mouth in the morning, Disp: , Rfl:   •  traZODone (DESYREL) 100 mg tablet, Take 0 5 tablets (50 mg total) by mouth daily at bedtime as needed for sleep, Disp: 90 tablet, Rfl: 0  •  zinc gluconate 50 mg tablet, Take 50 mg by mouth daily, Disp: , Rfl:     Review of Systems:  Denies fevers, chills, unintentional weight loss, excessive fatigue, chest pain, shortness of breath, abdominal pain, nausea, vomiting, urinary incontinence, urinary frequency, vaginal bleeding, vaginal discharge  All other systems negative unless otherwise stated  Physical Exam:  /72 (BP Location: Right arm, Patient Position: Sitting, Cuff Size: Standard)   Ht 5' 5 5" (1 664 m)   Wt 77 1 kg (170 lb)   LMP 01/01/2015 (Within Years)   BMI 27 86 kg/m²  Body mass index is 27 86 kg/m²  GEN: The patient was alert and oriented x3, pleasant well-appearing female in no acute distress  HEENT:  Unremarkable, no anterior or posterior lymphadenopathy, no thyromegaly  CV:  Regular rate and rhythm, normal S1 and S2, no murmurs  RESP:  Clear to auscultation bilaterally, no wheezes, rales or rhonchi  BREAST:  Symmetric breasts with no palpable breast masses or obvious breast lesions  She has no retractions or nipple discharge  She has no axillary abnormalities or palpable masses  GI:  Soft, nontender, non-distended  MSK: bilateral lower extremities are nontender, no edema  : Normal appearing external female genitalia, normal appearing urethral meatus  Atrophic appearing vaginal epithelium  On bimanual exam, no cervical motion tenderness; uterus is smooth, mobile, nontender 6 weeks size  No tenderness or fullness in the bilateral adnexa

## 2022-11-02 ENCOUNTER — CLINICAL SUPPORT (OUTPATIENT)
Dept: BARIATRICS | Facility: CLINIC | Age: 62
End: 2022-11-02

## 2022-11-02 VITALS — BODY MASS INDEX: 27.32 KG/M2 | WEIGHT: 170 LBS | HEIGHT: 66 IN

## 2022-11-02 DIAGNOSIS — R63.5 ABNORMAL WEIGHT GAIN: Primary | ICD-10-CM

## 2022-11-09 ENCOUNTER — CLINICAL SUPPORT (OUTPATIENT)
Dept: BARIATRICS | Facility: CLINIC | Age: 62
End: 2022-11-09

## 2022-11-09 VITALS — HEIGHT: 66 IN | WEIGHT: 169.4 LBS | BODY MASS INDEX: 27.23 KG/M2

## 2022-11-09 DIAGNOSIS — R63.5 ABNORMAL WEIGHT GAIN: Primary | ICD-10-CM

## 2022-11-16 ENCOUNTER — CLINICAL SUPPORT (OUTPATIENT)
Dept: BARIATRICS | Facility: CLINIC | Age: 62
End: 2022-11-16

## 2022-11-16 VITALS — HEIGHT: 66 IN | BODY MASS INDEX: 27.23 KG/M2 | WEIGHT: 169.4 LBS

## 2022-11-16 DIAGNOSIS — R63.5 ABNORMAL WEIGHT GAIN: Primary | ICD-10-CM

## 2022-11-28 ENCOUNTER — OFFICE VISIT (OUTPATIENT)
Dept: BARIATRICS | Facility: CLINIC | Age: 62
End: 2022-11-28

## 2022-11-28 VITALS — BODY MASS INDEX: 27.76 KG/M2 | HEIGHT: 66 IN

## 2022-11-28 DIAGNOSIS — R63.5 ABNORMAL WEIGHT GAIN: Primary | ICD-10-CM

## 2022-11-28 NOTE — PROGRESS NOTES
Weight Management Medical Nutrition Assessment  Jack Thorpe was here today for her 2 month follow-up in the Healthy [facility] Program   She asked not to get weighed this morning, since she will be getting weighed on Wednesday at class  (Please note: last week at class she weighed 169 4 lbs)  She continues to log her food using my fitness pal, and during the week is usually at 1200 - 1400 calories  She continues to swim, 3 - 4 times a week  She admits that she "stuggles on weekends "  This weekend was difficult because she had family staying with her and hosted Thanksgiving  She admits that she "did fine with the food making healthy meal choices, it was the snacking and alcohol "  She reports that she did still going swimming on her scheduled day even though she had a house full of family  She did order some additional bars to have at night if needed  Reviewed healthy snack ideas, and provided some recipes (roasted chick peas, healthy trail mix )  Discussed emotional and stress eating, which she understands and realizes that she does both    Reminded her of the General acute hospital visit - she has not scheduled it yet, but plans to once she gets her work schedule        Patient seen by Medical Provider in past 6 months:  yes  Requested to schedule appointment with Medical Provider: No        Anthropometric Measurements  Start Weight (#): 176 6  Current Weight (#): 169 4 (on 11/16)  TBW % Change from start weight:4%  Ideal Body Weight (#): 130   Goal Weight (#):155 - 160  Highest:181  Lowest:  148 - 150     Weight Loss History  Previous weight loss attempts: keto     Food and Nutrition Related History  Wake up:  9:30              Bed Time:4:45 am     Food Recall  Breakfast: 1/2 english muffin, banana              Snack:yogurt  Lunch:salad  Snack:apple  Dinner: lean meat, veg, starch  Snack: not now         Beverages: water  Volume of beverage intake: 60 - 64     Weekends: Same  Cravings: salty  Trouble area of day: night time     Frequency of Eating out: 3 times per week  Food restrictions:none  Cooking: self   Food Shopping: self     Physical Activity Intake  Activity:Swimming, walking and weights  Frequency:1/2 hour 3 days per week swimming  Physical limitations/barriers to exercise: spinal stenosis     Estimated Needs  Energy     [address] Energy Needs: BMR : 1114   1# loss weekly sedentary:  1099          1-2# loss weekly lightly active:832 - 1332  Maintenance calories for sedentary activity level: 1599  Protein:71 - 89      (1 2-1 5g/kg IBW)  Fluid: 68    (35mL/kg IBW)     Nutrition Diagnosis  Yes;    Overweight/obesity  related to Excess energy intake as evidenced by  BMI more than normative standard for age and sex (overweight 25-29  9)     Nutrition Intervention     Nutrition Prescription  Calories:1200 - 1400  Protein: 71 - 80  Fluid:70      Nutrition Education:    Healthy Core Manual  Calorie controlled menu  Lean protein food choices  Healthy snack options  Food journaling tips        Nutrition Counseling:  Strategies: meal planning, portion sizes, healthy snack choices, hydration, fiber intake, protein intake, exercise, food journal        Monitoring and Evaluation:  Evaluation criteria:  Energy Intake  Meet protein needs  Maintain adequate hydration  Monitor weekly weight  Meal planning/preparation  Food journal   Decreased portions at mealtimes and snacks  Physical activity      Barriers to learning:none  Readiness to change: Action:  (Changing behavior)  Comprehension: good  Expected Compliance: good

## 2022-12-06 ENCOUNTER — OFFICE VISIT (OUTPATIENT)
Dept: FAMILY MEDICINE CLINIC | Facility: CLINIC | Age: 62
End: 2022-12-06

## 2022-12-06 ENCOUNTER — HOSPITAL ENCOUNTER (OUTPATIENT)
Dept: RADIOLOGY | Facility: HOSPITAL | Age: 62
Discharge: HOME | End: 2022-12-06
Attending: FAMILY MEDICINE

## 2022-12-06 VITALS
TEMPERATURE: 97.4 F | SYSTOLIC BLOOD PRESSURE: 128 MMHG | WEIGHT: 169 LBS | OXYGEN SATURATION: 97 % | HEART RATE: 77 BPM | BODY MASS INDEX: 27.16 KG/M2 | HEIGHT: 66 IN | DIASTOLIC BLOOD PRESSURE: 74 MMHG | RESPIRATION RATE: 14 BRPM

## 2022-12-06 DIAGNOSIS — H69.81 EUSTACHIAN TUBE DYSFUNCTION, RIGHT: ICD-10-CM

## 2022-12-06 DIAGNOSIS — B34.9 VIRAL INFECTION, UNSPECIFIED: Primary | ICD-10-CM

## 2022-12-06 DIAGNOSIS — M89.8X8 SKULL MASS: ICD-10-CM

## 2022-12-06 DIAGNOSIS — E66.3 OVERWEIGHT: ICD-10-CM

## 2022-12-06 DIAGNOSIS — R05.1 ACUTE COUGH: ICD-10-CM

## 2022-12-06 DIAGNOSIS — I10 HYPERTENSION, ESSENTIAL: ICD-10-CM

## 2022-12-06 RX ORDER — BENZONATATE 100 MG/1
100 CAPSULE ORAL 3 TIMES DAILY PRN
Qty: 30 CAPSULE | Refills: 0 | Status: SHIPPED | OUTPATIENT
Start: 2022-12-06 | End: 2022-12-16

## 2022-12-06 NOTE — PROGRESS NOTES
COVID-19 Outpatient Progress Note    Assessment/Plan:    Problem List Items Addressed This Visit        Cardiovascular and Mediastinum    Hypertension, essential       Other    Overweight   Other Visit Diagnoses     Viral infection, unspecified    -  Primary    Acute cough        Relevant Medications    benzonatate (TESSALON PERLES) 100 mg capsule    Eustachian tube dysfunction, right        Skull mass        Relevant Orders    XR skull complete 4+ vw         Disposition:     After clarifying the patient's history, my suspicion for COVID-19 infection is very low  Discussed symptom directed medication options with patient  Viral Infection / Eustachian Tube Dysfunction - Start Tessalon Perles 100mg TID PRN Cough  Advise Estefania Yu med sinus rinse kit, Mucinex, Claritin/Zyrtec/Allegra, Flonase / Nasonex PRN, Avoid decongestants if you have high blood pressure  Skull Mass - Check Xray Skull - R/O Malignancy  I have spent 20 minutes directly with the patient  Greater than 50% of this time was spent in counseling/coordination of care regarding: diagnostic results, prognosis, risks and benefits of treatment options, instructions for management, patient and family education, importance of treatment compliance, risk factor reductions and impressions  Encounter provider: Pooja Hutchison DO     Provider located at: [address] [place] [address],[address]    [address] [facility] [address] [phone]     Recent Visits  No visits were found meeting these conditions  Showing recent visits within past 7 days and meeting all other requirements  Today's Visits  Date Type Provider Dept   12/06/22 Office Visit Garrison Celaya DO [facility] today's visits and meeting all other requirements  Future Appointments  No visits were found meeting these conditions    Showing future appointments within next 150 days and meeting all other requirements     Subjective: Kimmy Savage is a 60 y o  female who is concerned about COVID-19  Patient's symptoms include chills, fatigue, nasal congestion, rhinorrhea, sore throat (Worsening), cough, nausea, diarrhea (x 2 today, x 2 yesterday) and headache (Right frontal, parietal pain)  Patient denies fever, anosmia, loss of taste, shortness of breath, chest tightness, abdominal pain, vomiting and myalgias  - Date of symptom onset: 12/1/2022      COVID-19 vaccination status: Fully vaccinated with booster    Exposure:   Contact with a person who is under investigation (PUI) for or who is positive for COVID-19 within the last 14 days?: No    Hospitalized recently for fever and/or lower respiratory symptoms?: No      Currently a healthcare worker that is involved in direct patient care?: Yes      Works in a special setting where the risk of COVID-19 transmission may be high? (this may include long-term care, correctional and halfway facilities; homeless shelters; assisted-living facilities and group homes ): No      Resident in a special setting where the risk of COVID-19 transmission may be high? (this may include long-term care, correctional and halfway facilities; homeless shelters; assisted-living facilities and group homes ): No      Negative COVID home test 12/6/22, 12/4/22  Symptoms x 5 days  Using Flonase, Singulair, Mucinex c unknown benefit  Eating and drinking s difficulty  Lab Results   Component Value Date    [facility] Not Detected 02/27/2021       Review of Systems   Constitutional: Positive for chills and fatigue  Negative for fever  HENT: Positive for congestion, rhinorrhea and sore throat (Worsening)  Respiratory: Positive for cough  Negative for chest tightness and shortness of breath  Gastrointestinal: Positive for diarrhea (x 2 today, x 2 yesterday) and nausea  Negative for abdominal pain and vomiting  Musculoskeletal: Negative for myalgias     Neurological: Positive for headaches (Right frontal, parietal pain)  Current Outpatient Medications on File Prior to Visit   Medication Sig   • atorvastatin (LIPITOR) 10 mg tablet Take 1 tablet (10 mg total) by mouth daily at bedtime   • b complex vitamins capsule Take 1 capsule by mouth daily   • calcium gluconate 500 mg tablet Take 1,000 mg by mouth daily   • cholecalciferol (VITAMIN D3) 1,000 units tablet Take 1,000 Units by mouth daily   • estrogens, conjugated (Premarin) vaginal cream Insert 1 g into the vagina 2 (two) times a week At bedtime   • fluticasone (FLONASE) 50 mcg/act nasal spray 2 sprays into each nostril daily OTC   • losartan (COZAAR) 50 mg tablet Take 1 tablet (50 mg total) by mouth daily   • methocarbamol (ROBAXIN) 500 mg tablet Take 1 tablet (500 mg total) by mouth 3 (three) times a day as needed for muscle spasms   • montelukast (SINGULAIR) 10 mg tablet Take 10 mg by mouth daily at bedtime   • Multiple Vitamin (multivitamin) tablet Take 1 tablet by mouth daily   • Omega-3 Fatty Acids (fish oil) 1,000 mg Take 1,000 mg by mouth daily   • Probiotic Product (PROBIOTIC-10 PO) Take 1 tablet by mouth in the morning   • traZODone (DESYREL) 100 mg tablet Take 0 5 tablets (50 mg total) by mouth daily at bedtime as needed for sleep   • zinc gluconate 50 mg tablet Take 50 mg by mouth daily       Objective:    /74   Pulse 77   Temp (!) 97 4 °F (36 3 °C)   Resp 14   Ht 5' 5 5" (1 664 m)   Wt 76 7 kg (169 lb)   LMP 01/01/2015 (Within Years)   SpO2 97%   BMI 27 70 kg/m²      Physical Exam  Vitals and nursing note reviewed  Constitutional:       General: She is not in acute distress  Appearance: Normal appearance  She is well-developed  HENT:      Head: Normocephalic and atraumatic  Right Ear: A middle ear effusion (Clear) is present  Left Ear: Tympanic membrane, ear canal and external ear normal       Nose: Congestion present  Right Sinus: No maxillary sinus tenderness or frontal sinus tenderness  Left Sinus: No maxillary sinus tenderness or frontal sinus tenderness  Mouth/Throat:      Mouth: Mucous membranes are moist  Mucous membranes are pale  Pharynx: Oropharynx is clear  Uvula midline  No pharyngeal swelling, oropharyngeal exudate, posterior oropharyngeal erythema or uvula swelling  Eyes:      Extraocular Movements: Extraocular movements intact  Conjunctiva/sclera: Conjunctivae normal    Cardiovascular:      Rate and Rhythm: Normal rate and regular rhythm  Pulses: Normal pulses  Heart sounds: Normal heart sounds  No murmur heard  Pulmonary:      Effort: Pulmonary effort is normal  No respiratory distress  Breath sounds: Normal breath sounds  Abdominal:      Palpations: Abdomen is soft  Tenderness: There is no abdominal tenderness  There is no guarding or rebound  Musculoskeletal:         General: No swelling or tenderness  Cervical back: Neck supple  Right lower leg: No edema  Left lower leg: No edema  Comments: Right parietal skull superior to ear c 2cm x 2cm bony deposit on skull   Lymphadenopathy:      Cervical: No cervical adenopathy  Skin:     General: Skin is warm and dry  Capillary Refill: Capillary refill takes less than 2 seconds  Neurological:      General: No focal deficit present  Mental Status: She is alert and oriented to person, place, and time     Psychiatric:         Mood and Affect: Mood normal        Caesar Hurd DO

## 2022-12-06 NOTE — PATIENT INSTRUCTIONS
Advise Nirmala Leonard med sinus rinse kit, Mucinex, Claritin/Zyrtec/Allegra, Flonase / Nasonex PRN, Avoid decongestants if you have high blood pressure

## 2022-12-08 ENCOUNTER — TELEPHONE (OUTPATIENT)
Dept: FAMILY MEDICINE CLINIC | Facility: CLINIC | Age: 62
End: 2022-12-08

## 2022-12-08 NOTE — TELEPHONE ENCOUNTER
Patient called, she was in on Tuesday and she is out of work not feeling well still  She went to work yesterday but it was just too much to push through it  Would you please give her a work note for today and tomorrow  She also had her xray done on Tuesday afternoon   Please advise

## 2022-12-08 NOTE — TELEPHONE ENCOUNTER
Patient has been notified that work note has been completed and it is in her my chart, she had no further questions

## 2022-12-12 NOTE — RESULT ENCOUNTER NOTE
X-ray of the skull shows a slight bony prominence in the right parietal bone  Radiologist advises head CT with contrast for further evaluation  Nurse to see orders  Message sent to patient via [facility] patient portal     Nurse to also call patient

## 2022-12-13 ENCOUNTER — LAB (OUTPATIENT)
Dept: LAB | Facility: CLINIC | Age: 62
End: 2022-12-13

## 2022-12-13 ENCOUNTER — PATIENT MESSAGE (OUTPATIENT)
Dept: FAMILY MEDICINE CLINIC | Facility: CLINIC | Age: 62
End: 2022-12-13

## 2022-12-13 DIAGNOSIS — I10 HYPERTENSION, ESSENTIAL: Primary | ICD-10-CM

## 2022-12-13 DIAGNOSIS — I10 HYPERTENSION, ESSENTIAL: ICD-10-CM

## 2022-12-13 LAB
ALBUMIN SERPL BCP-MCNC: 4.7 G/DL (ref 3.5–5)
ALP SERPL-CCNC: 61 U/L (ref 34–104)
ALT SERPL W P-5'-P-CCNC: 15 U/L (ref 7–52)
ANION GAP SERPL CALCULATED.3IONS-SCNC: 5 MMOL/L (ref 4–13)
AST SERPL W P-5'-P-CCNC: 17 U/L (ref 13–39)
BILIRUB SERPL-MCNC: 0.51 MG/DL (ref 0.2–1)
BUN SERPL-MCNC: 18 MG/DL (ref 5–25)
CALCIUM SERPL-MCNC: 9.7 MG/DL (ref 8.4–10.2)
CHLORIDE SERPL-SCNC: 101 MMOL/L (ref 96–108)
CO2 SERPL-SCNC: 29 MMOL/L (ref 21–32)
CREAT SERPL-MCNC: 0.74 MG/DL (ref 0.6–1.3)
GFR SERPL CREATININE-BSD FRML MDRD: 86 ML/MIN/1.73SQ M
GLUCOSE SERPL-MCNC: 92 MG/DL (ref 65–140)
POTASSIUM SERPL-SCNC: 3.7 MMOL/L (ref 3.5–5.3)
PROT SERPL-MCNC: 7.9 G/DL (ref 6.4–8.4)
SODIUM SERPL-SCNC: 135 MMOL/L (ref 135–147)

## 2022-12-14 ENCOUNTER — HOSPITAL ENCOUNTER (OUTPATIENT)
Dept: CT IMAGING | Facility: HOSPITAL | Age: 62
Discharge: HOME | End: 2022-12-14
Attending: FAMILY MEDICINE

## 2022-12-14 ENCOUNTER — CLINICAL SUPPORT (OUTPATIENT)
Dept: BARIATRICS | Facility: CLINIC | Age: 62
End: 2022-12-14

## 2022-12-14 VITALS — WEIGHT: 171.2 LBS | HEIGHT: 66 IN | BODY MASS INDEX: 27.51 KG/M2

## 2022-12-14 DIAGNOSIS — M89.8X8 SKULL MASS: ICD-10-CM

## 2022-12-14 DIAGNOSIS — R63.5 ABNORMAL WEIGHT GAIN: Primary | ICD-10-CM

## 2022-12-14 DIAGNOSIS — G93.89 BRAIN MASS: Primary | ICD-10-CM

## 2022-12-14 LAB — HIV 1+2 AB+HIV1 P24 AG SERPL QL IA: NORMAL

## 2022-12-14 RX ADMIN — IOHEXOL 85 ML: 350 INJECTION, SOLUTION INTRAVENOUS at 10:30

## 2022-12-14 NOTE — RESULT ENCOUNTER NOTE
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

## 2022-12-19 DIAGNOSIS — M89.8X8 SKULL MASS: ICD-10-CM

## 2022-12-19 DIAGNOSIS — G93.89 BRAIN MASS: Primary | ICD-10-CM

## 2022-12-29 ENCOUNTER — HOSPITAL ENCOUNTER (OUTPATIENT)
Dept: MRI IMAGING | Facility: HOSPITAL | Age: 62
Discharge: HOME | End: 2022-12-29
Attending: FAMILY MEDICINE

## 2022-12-29 DIAGNOSIS — G93.89 BRAIN MASS: ICD-10-CM

## 2022-12-29 DIAGNOSIS — M89.8X8 SKULL MASS: ICD-10-CM

## 2022-12-29 RX ADMIN — GADOBUTROL 7 ML: 604.72 INJECTION INTRAVENOUS at 18:01

## 2023-01-03 NOTE — RESULT ENCOUNTER NOTE
MRI of the brain with and without contrast shows nonenhancing parenchymal cyst in the left frontoparietal junction, measuring 4 1 x 3 0 x 4 9 cm  There is no surrounding signal abnormality  The cyst is pushing on the body of the left left lateral ventricle without midline shift  Differential diagnosis considerations remain unchanged from prior study, which include neuroglial cyst, solitary dilated perivascular space, or ependymal cyst   Radiologist thinks low-grade neoplasm is very unlikely given this appearance  Radiologist advises repeat MRI with contrast of the brain in 6 months to confirm stability  Nurse to see orders  Message sent to patient via [facility] [employer] patient portal     Nurse to also call patient

## 2023-01-04 ENCOUNTER — CLINICAL SUPPORT (OUTPATIENT)
Dept: BARIATRICS | Facility: CLINIC | Age: 63
End: 2023-01-04

## 2023-01-04 VITALS — HEIGHT: 66 IN | WEIGHT: 172.2 LBS | BODY MASS INDEX: 27.68 KG/M2

## 2023-01-04 DIAGNOSIS — R63.5 ABNORMAL WEIGHT GAIN: Primary | ICD-10-CM

## 2023-01-11 ENCOUNTER — OFFICE VISIT (OUTPATIENT)
Dept: NEUROSURGERY | Facility: CLINIC | Age: 63
End: 2023-01-11

## 2023-01-11 VITALS
BODY MASS INDEX: 47.09 KG/M2 | RESPIRATION RATE: 18 BRPM | DIASTOLIC BLOOD PRESSURE: 84 MMHG | WEIGHT: 293 LBS | HEIGHT: 66 IN | HEART RATE: 72 BPM | SYSTOLIC BLOOD PRESSURE: 138 MMHG | OXYGEN SATURATION: 97 % | TEMPERATURE: 97.9 F

## 2023-01-11 DIAGNOSIS — R51.9 HEADACHE: Primary | ICD-10-CM

## 2023-01-11 DIAGNOSIS — M89.8X8 SKULL MASS: ICD-10-CM

## 2023-01-11 DIAGNOSIS — G93.89 BRAIN MASS: ICD-10-CM

## 2023-01-11 NOTE — PROGRESS NOTES
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   Patient is aware of the cyst that was incidentally found about 8 years ago on brain images done at [facility], but she says recently feeling a pressure type headache and discomfort since November 2022, she saw her PCP and ordered MRI  Concerned about increasing size and ongoing headache  Patient also reports feeling stressed at work, felt somewhat better during [place] when she took off days from work  Patient reported that Tylenol is not helping  her headache  Otherwise patient denies any  seizures, nausea, vomiting, vision issues, and  sensory or motor neurological dysfunction  A report from Images done on 5/14/ 2014 read a 2 7 x 2 cm, there is  also report of the  size of the cyst from 2009 image as 1 7 x 1 2 cm  MRI of brain done on 12/29/2022 reported 4 1 x 3 x 4 9 cms  Exam-A&OX3  PERRL, EOMI 2 mm conj bilaterally intact VF x4  SF  Genet  Finger-to-nose test is normal and without drift bilaterally  Strength is 5/5 in both upper and lower extremities  Sensation to light touch intact throughout  DTR 2+ without clonus bilaterally  Stable gait on heel-to-toe walking  Hx, PEx, and images reviewed with the patient  Mx plan discussed  Dr José Lamar reviewed the images and discussed with the patient, recommends to obtain old images for comparison  Amanda e-mailed [facility] to upload the image/ patient to get CD of previous ( 2014) images from [facility]  Ambulatory referral to neurology for headache Mx order placed   Will review the images once we obtained previous images and call the patient to discuss next Mx plan  All questions and concerns were answered to patient's satisfaction  Patient verbalized her understanding's and agreed with the plan    Plan:  1  Ambulatory referral to neurology  2  Pending previous images uploads/obtain a CD by the patient  3  Will call patient and discuss Mx plan once we review the images  4  Call with questions or concerns    I spent 30  minutes with the patient today in which >50% of the time was spent counseling/coordination of care regarding diagnosis, imaging review, symptoms and treatment plan  C/C: " Referred by her PCP for evaluation of abnormal MRI"    History of Present Illness     Patient is a 60 yrs old pleasant woman with PMHx of HTN, Hyperlipidemia, Anxiety, MDD morbid obesity, chronic neck pain,  here today referred by her PCP for evaluation of abnormal MRI of the brain  Brain MRI with and without contrast on 12/29/2022  shows 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, 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   Patient is aware of the cyst that was incidentally found about 8 years ago on brain images done at [facility], but she says recently feeling a pressure type headache and discomfort since November 2022, she saw her PCP and ordered MRI  Concerned about increasing size and ongoing headache  Patient also reports feeling stressed at work, felt somewhat better during [place] when she took off days from work  Patient reported that Tylenol is not helping  her headache  Otherwise patient denies any  seizures, nausea, vomiting, vision issues, and  sensory or motor neurological dysfunction  Patient denies history of fever, chills, rigors, cough or chest pain  She denies history of diabetes mellitus, congestive failure, stroke, seizures, bleeding disorder or taking truculent medications  She has history of smoking cigarettes but drinks alcohol  Image findings as described in the assessment section above        REVIEW OF SYSTEMS  Review of system personally reviewed and updated  Review of Systems   Constitutional: Negative  HENT: Negative for facial swelling ( b/l jaw pain at times), trouble swallowing and voice change  Eyes: Negative  Negative for visual disturbance  Respiratory: Negative  Cardiovascular: Negative  Gastrointestinal: Negative  Endocrine: Negative  Genitourinary: Negative  Musculoskeletal: Positive for neck pain  Skin: Negative  Allergic/Immunologic: Negative  Neurological: Positive for headaches (x 2months  She describes her headache has pressure like tension on right side of her head)  Has not seen Neuology  Brain MRI done 12/29/22  XR SKULL 12/6/22    CT HEAD 12/14/22      Hematological: Negative  Psychiatric/Behavioral: Negative  All other systems reviewed and are negative          Meds/Allergies     Current Outpatient Medications   Medication Sig Dispense Refill   • atorvastatin (LIPITOR) 10 mg tablet Take 1 tablet (10 mg total) by mouth daily at bedtime 90 tablet 0   • b complex vitamins capsule Take 1 capsule by mouth daily     • calcium gluconate 500 mg tablet Take 1,000 mg by mouth daily     • cholecalciferol (VITAMIN D3) 1,000 units tablet Take 1,000 Units by mouth daily     • estrogens, conjugated (Premarin) vaginal cream Insert 1 g into the vagina 2 (two) times a week At bedtime 30 g 2   • fluticasone (FLONASE) 50 mcg/act nasal spray 2 sprays into each nostril daily OTC     • losartan (COZAAR) 50 mg tablet Take 1 tablet (50 mg total) by mouth daily 90 tablet 1   • methocarbamol (ROBAXIN) 500 mg tablet Take 1 tablet (500 mg total) by mouth 3 (three) times a day as needed for muscle spasms 90 tablet 0   • montelukast (SINGULAIR) 10 mg tablet Take 10 mg by mouth daily at bedtime     • Multiple Vitamin (multivitamin) tablet Take 1 tablet by mouth daily     • Omega-3 Fatty Acids (fish oil) 1,000 mg Take 1,000 mg by mouth daily     • Probiotic Product (PROBIOTIC-10 PO) Take 1 tablet by mouth in the morning     • traZODone (DESYREL) 100 mg tablet Take 0 5 tablets (50 mg total) by mouth daily at bedtime as needed for sleep 90 tablet 0   • zinc gluconate 50 mg tablet Take 50 mg by mouth daily       No current facility-administered medications for this visit  No Known Allergies    PAST HISTORY    Past Medical History:   Diagnosis Date   • Anxiety 07/29/2022   • Cervical radiculopathy 11/01/2018   • Cervical spondylosis without myelopathy 11/01/2018   • History of colon polyps 07/29/2022   • Hypertension, essential 10/16/2019   • Mixed hyperlipidemia 07/11/2018   • Non-seasonal allergic rhinitis    • Osteopenia of multiple sites 07/24/2019   • Other insomnia 07/29/2022   • Overweight    • Spinal stenosis of lumbar region with neurogenic claudication 10/22/2021   • Vitamin D deficiency 12/08/2020       Past Surgical History:   Procedure Laterality Date   • EGD  02/26/2018   • LAMINECTOMY AND MICRODISCECTOMY LUMBAR SPINE         Social History     Tobacco Use   • Smoking status: Never   • Smokeless tobacco: Never   Vaping Use   • Vaping Use: Never used   Substance Use Topics   • Alcohol use: Yes     Alcohol/week: 8 0 standard drinks     Types: 8 Glasses of wine per week   • Drug use: Never       Family History   Adopted: Yes   Problem Relation Age of Onset   • No Known Problems Mother         Estranged   • No Known Problems Father         Estranged   • No Known Problems Daughter    • No Known Problems Daughter    • No Known Problems Son    • Asperger's syndrome Son    • Autism spectrum disorder Son    • Bipolar disorder Son    • Anxiety disorder Son          Above history personally reviewed  EXAM    Vitals:Last menstrual period 01/01/2015, not currently breastfeeding  ,There is no height or weight on file to calculate BMI  Physical Exam  Constitutional:       Appearance: Normal appearance  HENT:      Head: Normocephalic and atraumatic     Eyes:      Extraocular Movements: Extraocular movements intact  Pupils: Pupils are equal, round, and reactive to light  Cardiovascular:      Rate and Rhythm: Normal rate  Pulmonary:      Effort: Pulmonary effort is normal    Musculoskeletal:         General: Normal range of motion  Cervical back: Normal range of motion  Neurological:      General: No focal deficit present  Mental Status: She is alert and oriented to person, place, and time  GCS: GCS eye subscore is 4  GCS verbal subscore is 5  GCS motor subscore is 6  Cranial Nerves: Cranial nerves 2-12 are intact  Sensory: Sensation is intact  Motor: Motor function is intact  Coordination: Finger-Nose-Finger Test normal       Deep Tendon Reflexes: Reflexes are normal and symmetric  Reflex Scores:       Tricep reflexes are 2+ on the right side and 2+ on the left side  Bicep reflexes are 2+ on the right side and 2+ on the left side  Brachioradialis reflexes are 2+ on the right side and 2+ on the left side  Patellar reflexes are 2+ on the right side and 2+ on the left side  Achilles reflexes are 2+ on the right side and 2+ on the left side  Psychiatric:         Speech: Speech normal          Neurologic Exam     Mental Status   Oriented to person, place, and time  Speech: speech is normal   Level of consciousness: alert    Cranial Nerves   Cranial nerves II through XII intact  CN III, IV, VI   Pupils are equal, round, and reactive to light  Right pupil: Size: 2 mm  Shape: regular  Reactivity: brisk  Left pupil: Size: 2 mm  Shape: regular  Reactivity: brisk     Nystagmus: none     CN XI   CN XI normal      Motor Exam   Muscle bulk: normal  Overall muscle tone: normal  Right arm tone: normal  Left arm tone: normal  Right arm pronator drift: absent  Left arm pronator drift: absent  Right leg tone: normal  Left leg tone: normal    Sensory Exam   Light touch normal      Gait, Coordination, and Reflexes Coordination   Finger to nose coordination: normal    Reflexes   Right brachioradialis: 2+  Left brachioradialis: 2+  Right biceps: 2+  Left biceps: 2+  Right triceps: 2+  Left triceps: 2+  Right patellar: 2+  Left patellar: 2+  Right achilles: 2+  Left achilles: 2+  Right : 2+  Left : 2+  Right Kurtz: absent  Left Kurtz: absent  Right ankle clonus: absent  Left pendular knee jerk: absent        MEDICAL DECISION MAKING    Imaging Studies:     CT head w wo contrast    Result Date: 12/14/2022  Narrative: CT BRAIN - WITH AND WITHOUT CONTRAST INDICATION:   M89 8X8: Other specified disorders of bone, other site  COMPARISON:  Calvarial radiographs 12/6/2022 TECHNIQUE:  CT examination of the brain was performed both prior to and after the administration of intravenous contrast   In addition to axial images, sagittal and coronal 2D reformatted images were created and submitted for interpretation  Radiation dose length product (DLP) for this visit:  1780 mGy-cm   This examination, like all CT scans performed in the [facility], was performed utilizing techniques to minimize radiation dose exposure, including the use of iterative reconstruction and automated exposure control  IV Contrast:  85 mL of iohexol (OMNIPAQUE)  IMAGE QUALITY:  Diagnostic  FINDINGS: PARENCHYMA:  Focal 3 8 x 2 8 x 4 7 cm (AP x TRV x CC) left parietal cystic lesion located superior to the posterior horn of the left lateral ventricle  There is no surrounding vasogenic edema  The lesion demonstrates no significant enhancement  There is secondary mass effect along the superior aspect of the body and posterior horn of the left lateral ventricle  There is no midline shift  No CT signs of acute infarction  No acute parenchymal hemorrhage  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 within the posterior horns  VENTRICLES AND EXTRA-AXIAL SPACES:  Normal for patient's age   VISUALIZED ORBITS AND PARANASAL SINUSES: Normal visualized orbits  Moderate right maxillary sinus mucosal thickening  The remainder of the visualized paranasal sinus cavities are clear  The mastoid air cells are clear  CALVARIUM:  A sessile osteoma is noted along the right parietal outer calvarial table corresponding to the radiographic abnormality  Impression: 1  Focal left parietal nonenhancing cystic lesion located superior to the posterior horn of the left lateral ventricle  There is no surrounding vasogenic edema  Differential diagnosis includes neuroglial cyst, ependymal cyst, versus solitary dilated perivascular space  MRI of the brain with contrast is recommended to assess for any subtle perilesional signal abnormality  2  Small sessile osteoma is noted along the right parietal outer calvarial table  Workstation performed: [id number]     MRI brain w wo contrast    Result Date: 1/3/2023  Narrative: MRI BRAIN WITH AND WITHOUT CONTRAST INDICATION: G93 89: Other specified disorders of brain M89 8X8: Other specified disorders of bone, other site  COMPARISON:  Prior CT December 14, 2022 TECHNIQUE: Multiplanar, multisequence imaging of the brain was performed before and after gadolinium administration  IV Contrast:  7 mL of Gadobutrol injection (SINGLE-DOSE)  IMAGE QUALITY:   Diagnostic  FINDINGS: BRAIN PARENCHYMA:  CSF intensity nonenhancing cystic lesion is identified in the left frontotemporal parietal junction measuring 4 1 x 3 0 x 4 9 cm  There is no surrounding FLAIR or T2 signal abnormality  Local mass effect is noted on the body of the left lateral ventricle  No midline shift  Small scattered hyperintensities on T2/FLAIR imaging are noted in the periventricular and subcortical white matter demonstrating an appearance that is statistically most likely to represent mild microangiopathic change  Postcontrast imaging of the brain demonstrates no abnormal enhancement  VENTRICLES:  Normal for the patient's age   SELLA AND PITUITARY GLAND: Normal  ORBITS:  Normal  PARANASAL SINUSES:  Normal  VASCULATURE:  Evaluation of the major intracranial vasculature demonstrates appropriate flow voids  CALVARIUM AND SKULL BASE:  Normal  EXTRACRANIAL SOFT TISSUES:  Normal      Impression: Nonenhancing parenchymal cyst identified left frontoparietal junction measuring 4 1 x 3 0 x 4 9 cm  No surrounding signal abnormality  Mass effect on the body of left lateral ventricle without midline shift  Differential considerations remain unchanged from the prior study with neuroglial cyst, solitary dilated perivascular space, or ependymal cyst   Low-grade neoplasm would be very unlikely given this appearance  Repeat gadolinium-enhanced MRI of the brain recommended in 6 months to confirm  stability   Workstation performed: [id number]       I have personally reviewed pertinent reports   , I have personally reviewed pertinent films in PACS and I have personally reviewed pertinent films in PACS with a Radiologist

## 2023-01-13 ENCOUNTER — HOSPITAL ENCOUNTER (OUTPATIENT)
Dept: MAMMOGRAPHY | Facility: HOSPITAL | Age: 63
Discharge: HOME | End: 2023-01-13
Attending: OBSTETRICS & GYNECOLOGY

## 2023-01-13 VITALS — HEIGHT: 66 IN | BODY MASS INDEX: 27.16 KG/M2 | WEIGHT: 169 LBS

## 2023-01-13 DIAGNOSIS — Z12.31 ENCOUNTER FOR SCREENING MAMMOGRAM FOR MALIGNANT NEOPLASM OF BREAST: ICD-10-CM

## 2023-01-24 ENCOUNTER — TELEPHONE (OUTPATIENT)
Dept: OTHER | Facility: OTHER | Age: 63
End: 2023-01-24

## 2023-02-21 ENCOUNTER — TELEPHONE (OUTPATIENT)
Dept: PAIN MEDICINE | Facility: CLINIC | Age: 63
End: 2023-02-21

## 2023-02-21 NOTE — TELEPHONE ENCOUNTER
Fatimah Sommer    Doctor: Patricia Grullon    Reason for call: patient called asking for a repeat procedure that she had on Aug 2022- pain level 5/10    Pt is looking disks that she brought into the office and wants to if the office still has them    Call back#: [phone]

## 2023-02-21 NOTE — TELEPHONE ENCOUNTER
S/w pt, states her lower back pain to right hip is back again 5/10 with weakness on her right leg  Pt had right L4-L5 LESI  8/30/22 with 90% relief  Pt is requesting a repeat of this procedure

## 2023-02-21 NOTE — TELEPHONE ENCOUNTER
Denise Degree     Doctor: Floyd Smith     Reason for call: patient returning call from clinical -transferred to clinical

## 2023-03-14 ENCOUNTER — ANESTHESIA EVENT (OUTPATIENT)
Dept: GASTROENTEROLOGY | Facility: HOSPITAL | Age: 63
End: 2023-03-14

## 2023-03-14 ENCOUNTER — ANESTHESIA (OUTPATIENT)
Dept: GASTROENTEROLOGY | Facility: HOSPITAL | Age: 63
End: 2023-03-14

## 2023-03-14 ENCOUNTER — HOSPITAL ENCOUNTER (OUTPATIENT)
Dept: GASTROENTEROLOGY | Facility: HOSPITAL | Age: 63
Setting detail: OUTPATIENT SURGERY
Discharge: HOME | End: 2023-03-14
Attending: COLON & RECTAL SURGERY

## 2023-03-14 VITALS
HEIGHT: 65 IN | WEIGHT: 164.46 LBS | OXYGEN SATURATION: 100 % | BODY MASS INDEX: 27.4 KG/M2 | DIASTOLIC BLOOD PRESSURE: 73 MMHG | TEMPERATURE: 99.1 F | RESPIRATION RATE: 20 BRPM | SYSTOLIC BLOOD PRESSURE: 127 MMHG | HEART RATE: 62 BPM

## 2023-03-14 DIAGNOSIS — Z87.19 HISTORY OF RECTAL POLYPS: ICD-10-CM

## 2023-03-14 RX ORDER — LIDOCAINE HYDROCHLORIDE 10 MG/ML
INJECTION, SOLUTION EPIDURAL; INFILTRATION; INTRACAUDAL; PERINEURAL AS NEEDED
Status: DISCONTINUED | OUTPATIENT
Start: 2023-03-14 | End: 2023-03-14

## 2023-03-14 RX ORDER — SODIUM CHLORIDE, SODIUM LACTATE, POTASSIUM CHLORIDE, CALCIUM CHLORIDE 600; 310; 30; 20 MG/100ML; MG/100ML; MG/100ML; MG/100ML
INJECTION, SOLUTION INTRAVENOUS CONTINUOUS PRN
Status: DISCONTINUED | OUTPATIENT
Start: 2023-03-14 | End: 2023-03-14

## 2023-03-14 RX ORDER — PROPOFOL 10 MG/ML
INJECTION, EMULSION INTRAVENOUS AS NEEDED
Status: DISCONTINUED | OUTPATIENT
Start: 2023-03-14 | End: 2023-03-14

## 2023-03-14 RX ADMIN — SODIUM CHLORIDE, SODIUM LACTATE, POTASSIUM CHLORIDE, AND CALCIUM CHLORIDE: .6; .31; .03; .02 INJECTION, SOLUTION INTRAVENOUS at 08:14

## 2023-03-14 RX ADMIN — PROPOFOL 50 MG: 10 INJECTION, EMULSION INTRAVENOUS at 08:35

## 2023-03-14 RX ADMIN — SODIUM CHLORIDE, SODIUM LACTATE, POTASSIUM CHLORIDE, AND CALCIUM CHLORIDE: .6; .31; .03; .02 INJECTION, SOLUTION INTRAVENOUS at 08:05

## 2023-03-14 RX ADMIN — LIDOCAINE HYDROCHLORIDE 5 ML: 10 INJECTION, SOLUTION EPIDURAL; INFILTRATION; INTRACAUDAL; PERINEURAL at 08:28

## 2023-03-14 RX ADMIN — PROPOFOL 50 MG: 10 INJECTION, EMULSION INTRAVENOUS at 08:38

## 2023-03-14 RX ADMIN — PROPOFOL 50 MG: 10 INJECTION, EMULSION INTRAVENOUS at 08:32

## 2023-03-14 RX ADMIN — PROPOFOL 150 MG: 10 INJECTION, EMULSION INTRAVENOUS at 08:28

## 2023-03-14 NOTE — ANESTHESIA PREPROCEDURE EVALUATION
Procedure:  COLONOSCOPY    Relevant Problems   CARDIO   (+) Hypertension, essential   (+) Mixed hyperlipidemia      MUSCULOSKELETAL   (+) Cervical spondylosis without myelopathy      NEURO/PSYCH   (+) Anxiety   (+) History of colon polyps   (+) Major depressive disorder with single episode, in remission (HCC)      Musculoskeletal and Integument   (+) Osteopenia of multiple sites        Physical Exam    Airway    Mallampati score: II  TM Distance: >3 FB  Neck ROM: full     Dental       Cardiovascular      Pulmonary      Other Findings        Anesthesia Plan  ASA Score- 2     Anesthesia Type- IV sedation with anesthesia with ASA Monitors  Additional Monitors:   Airway Plan:     Comment: Recent labs personally reviewed:  Lab Results       Component                Value               Date                       WBC                      4 48                08/29/2022                 HGB                      13 6                08/29/2022                 PLT                      265                 08/29/2022            Lab Results       Component                Value               Date                       K                        3 7                 12/13/2022                 BUN                      18                  12/13/2022                 CREATININE               0 74                12/13/2022            No results found for: PTT   No results found for: INR    Blood type     I, Savanah Vazquez MD, have personally seen and evaluated the patient prior to anesthetic care  I have reviewed the pre-anesthetic record, medical history, allergies, medications and any other medical records if appropriate to the anesthetic care  If a CRNA is involved in the case, I have reviewed the CRNA assessment, if present, and agree  Patient consented for IV Sedation, general anesthesia as back up  Discussed risks of aspiration, IV infiltration, indications for conversion to general anesthesia   All questions and concerns addressed          Plan Factors-Exercise tolerance (METS): >4 METS  Chart reviewed  Patient summary reviewed  Patient is not a current smoker  Patient did not smoke on day of surgery  Obstructive sleep apnea risk education given perioperatively  Induction- intravenous  Postoperative Plan-     Informed Consent- Anesthetic plan and risks discussed with patient  I personally reviewed this patient with the CRNA  Discussed and agreed on the Anesthesia Plan with the CRNA  Giorgi Golden

## 2023-03-14 NOTE — ANESTHESIA POSTPROCEDURE EVALUATION
Post-Op Assessment Note    CV Status:  Stable    Pain management: adequate     Mental Status:  Alert and awake   Hydration Status:  Euvolemic   PONV Controlled:  Controlled   Airway Patency:  Patent      Post Op Vitals Reviewed: Yes      Staff: CRNA         No notable events documented      /74 (03/14/23 0847)    Temp 99 1 °F (37 3 °C) (03/14/23 0847)    Pulse 67 (03/14/23 0847)   Resp 17 (03/14/23 0847)    SpO2 99 % (03/14/23 0847)

## 2023-03-14 NOTE — H&P
History and Physical - [facility]  Pavel Reynolds 60 y o  female MRN: [id number]                  HPI: Pavel Reynolds is a 60y o  year old female who presents for  Hx colon polyps      REVIEW OF SYSTEMS: Per the HPI, and otherwise unremarkable  Historical Information   Past Medical History:   Diagnosis Date   • Anxiety 07/29/2022   • Cervical radiculopathy 11/01/2018   • Cervical spondylosis without myelopathy 11/01/2018   • History of colon polyps 07/29/2022   • Hypertension, essential 10/16/2019   • Mixed hyperlipidemia 07/11/2018   • Non-seasonal allergic rhinitis    • Osteopenia of multiple sites 07/24/2019   • Other insomnia 07/29/2022   • Overweight    • Spinal stenosis of lumbar region with neurogenic claudication 10/22/2021   • Vitamin D deficiency 12/08/2020     Past Surgical History:   Procedure Laterality Date   • BREAST EXCISIONAL BIOPSY Left 1991   • BREAST EXCISIONAL BIOPSY Left     benign   • EGD  02/26/2018   • LAMINECTOMY AND MICRODISCECTOMY LUMBAR SPINE       Social History   Social History     Substance and Sexual Activity   Alcohol Use Yes   • Alcohol/week: 8 0 standard drinks   • Types: 8 Glasses of wine per week     Social History     Substance and Sexual Activity   Drug Use Never     Social History     Tobacco Use   Smoking Status Never   Smokeless Tobacco Never     Family History   Adopted: Yes   Problem Relation Age of Onset   • No Known Problems Mother         Estranged   • No Known Problems Father         Estranged   • No Known Problems Daughter    • No Known Problems Daughter    • No Known Problems Son    • Asperger's syndrome Son    • Autism spectrum disorder Son    • Bipolar disorder Son    • Anxiety disorder Son        Meds/Allergies     (Not in a hospital admission)      No Known Allergies    Objective     Blood pressure 154/81, pulse 74, temperature 98 6 °F (37 °C), temperature source Temporal, resp   rate 16, height 5' 5" (1 651 m), weight 74 6 kg (164 lb 7 4 oz), last menstrual period 01/01/2015, SpO2 98 %, not currently breastfeeding        PHYSICAL EXAM    Gen: NAD  CV: RRR  CHEST: Clear  ABD: soft, NT/ND  EXT: no edema  Neuro: AAO      ASSESSMENT/PLAN:  This is a 60y o  year old female here for hx colon polyps     PLAN:   Procedure: colonoscopy

## 2023-03-28 ENCOUNTER — TELEPHONE (OUTPATIENT)
Dept: OTHER | Facility: OTHER | Age: 63
End: 2023-03-28

## 2023-03-28 NOTE — TELEPHONE ENCOUNTER
Patient is calling regarding cancelling an appointment      Date/Time: 3/28/23 @ 8:30am    Patient was rescheduled: YES [] NO [x]    Patient requesting call back to reschedule: YES [x] NO []

## 2023-04-06 ENCOUNTER — TELEPHONE (OUTPATIENT)
Dept: PAIN MEDICINE | Facility: CLINIC | Age: 63
End: 2023-04-06

## 2023-04-06 NOTE — TELEPHONE ENCOUNTER
Caller: Nuria Hassan     Doctor: Yue Mahan    Reason for call: patient is having neck and upper back issues also getting migraines can you prescribe anything?     Call back#: [phone]    I transferred patient to procedure

## 2023-04-06 NOTE — TELEPHONE ENCOUNTER
S/w pt, states she has been experiencing significant upper back and neck pain  Pt states she feels migraines are coming from back symptoms  Pt denies taking medication for pain aside from muscle relaxer, denies migraine medication  Scheduled for OV on 4/24, LESI on 4/27  Any further recommendations for neck pain? Thank you

## 2023-04-06 NOTE — TELEPHONE ENCOUNTER
Her neck has never been evaluated by our office  She can do OTC NSAIDs and Tylenol, lidocaine patches and voltaren gel

## 2023-04-10 NOTE — TELEPHONE ENCOUNTER
----- Message from Damian Jurado DO sent at 7/29/2022 10:03 AM EDT -----  Regarding: Hep C 12/10/20 @ [facility], Colonoscopy 2/26/18 @ [facility], Pap 8/4/20 @ [facility], Mammo 1/12/22 @ [facility], Dexa 9/8/22 @ [facility]  07/29/22 10:03 AM    Hello, our patient Peace Parker has had CRC: Colonoscopy, DEXA Scan, Hepatitis C, Mammogram, and Pap Smear (HPV) aka Cervical Cancer Screening completed/performed  Please assist in updating the patient chart by pulling the Care Everywhere (CE) document  The date of service is        Hep C 12/10/20 @ [facility], Colonoscopy 2/26/18 @ [facility], Pap 8/4/20 @ [facility], Mammo 1/12/22 @ [facility], Dexa 9/8/22 @ [facility]    Thank you,  Karyn Euceda DO  [facility] Day Of The Week Treatment Administered: Monday

## 2023-04-24 ENCOUNTER — OFFICE VISIT (OUTPATIENT)
Dept: PAIN MEDICINE | Facility: CLINIC | Age: 63
End: 2023-04-24

## 2023-04-24 VITALS
HEART RATE: 70 BPM | RESPIRATION RATE: 18 BRPM | HEIGHT: 65 IN | WEIGHT: 162 LBS | DIASTOLIC BLOOD PRESSURE: 92 MMHG | SYSTOLIC BLOOD PRESSURE: 141 MMHG | BODY MASS INDEX: 26.99 KG/M2

## 2023-04-24 DIAGNOSIS — M54.12 CERVICAL RADICULOPATHY: ICD-10-CM

## 2023-04-24 DIAGNOSIS — G89.4 CHRONIC PAIN SYNDROME: Primary | ICD-10-CM

## 2023-04-24 DIAGNOSIS — M54.2 NECK PAIN: ICD-10-CM

## 2023-04-24 DIAGNOSIS — M79.18 MYOFASCIAL PAIN SYNDROME: ICD-10-CM

## 2023-04-24 DIAGNOSIS — M47.812 CERVICAL SPONDYLOSIS: ICD-10-CM

## 2023-04-24 RX ORDER — METHOCARBAMOL 500 MG/1
500 TABLET, FILM COATED ORAL 3 TIMES DAILY PRN
Qty: 90 TABLET | Refills: 3 | Status: SHIPPED | OUTPATIENT
Start: 2023-04-24

## 2023-04-24 NOTE — PROGRESS NOTES
Assessment:  1  Chronic pain syndrome    2  Cervical radiculopathy    3  Myofascial pain syndrome    4  Neck pain    5  Cervical spondylosis        Plan:  The patient is a 34-year-old female with a history of chronic pain secondary to low back pain, lumbar stenosis with neurogenic claudication, lumbar spondylosis, neck pain, cervical radiculopathy, cervical spondylosis and myofascial pain who presents to the office with ongoing neck pain, left worse than right and numbness and pins and needle sensation into bilateral shoulders and right-sided mid back pain  At this time, I instructed patient we can perform a cervical epidural steroid injection to decrease inflammation and provide her relief  Patient would like to proceed  I instructed our  will schedule her  Complete risks and benefits including bleeding, infection, tissue reaction, nerve injury and allergic reaction were discussed  The approach was demonstrated using models and literature was provided  Verbal and written consent was obtained  The patient has previously used a chiropractor which provided her relief, therefore I will refer her back to the chiropractor  A referral was placed and provided to the patient  She can also continue her current pain medication regimen, refills for methocarbamol were sent to the patient's pharmacy  She can continue with physical therapy  My impressions and treatment recommendations were discussed in detail with the patient who verbalized understanding and had no further questions  Discharge instructions were provided  I personally saw and examined the patient and I agree with the above discussed plan of care  Orders Placed This Encounter   Procedures   • [place] spine and pain procedure     Dr Rima Medellin     Standing Status:   Future     Standing Expiration Date:   4/24/2027     Order Specific Question:   Reason for Exam:     Answer:   JOELLE     Order Specific Question:   Anticoagulant hold needed? Answer:   No   • Ambulatory referral to Chiropractic     Standing Status:   Future     Standing Expiration Date:   4/24/2024     Referral Priority:   Routine     Referral Type:   Chiropractic     Referral Reason:   Specialty Services Required     Referred to Provider:   Chaparro Leonard DC     Requested Specialty:   Chiropractic Medicine     Number of Visits Requested:   1     Expiration Date:   4/24/2024     New Medications Ordered This Visit   Medications   • methocarbamol (ROBAXIN) 500 mg tablet     Sig: Take 1 tablet (500 mg total) by mouth 3 (three) times a day as needed for muscle spasms     Dispense:  90 tablet     Refill:  3       History of Present Illness:  Julissa Kinney is a 60 y o  female with a history of chronic pain secondary to low back pain, lumbar stenosis with neurogenic claudication, lumbar spondylosis, neck pain, cervical radiculopathy, cervical spondylosis and myofascial pain  She was last seen on on 7/27/2022 where she was started on tizanidine to help with muscle spasms  At that last office visit, she was also ordered an EMG of the lower extremities  She presents to the office with ongoing neck pain, left worse than right with numbness and pins and needle sensation into bilateral shoulders and right-sided mid back pain  She states her pain is the same since the last office visit and intermittent but worse in the morning  She rates the quality of her pain as dull/aching, pressure-like and is currently rating her pain a 5/10 on a numeric scale  Current pain medications include ibuprofen and Voltaren as needed  She also uses methocarbamol 500 mg as needed for muscle spasms  She states this medication is providing her mild relief of her pain  She is currently scheduled to undergo a right L4-L5 TFESI on 4/27/2023 to help with her low back pain  She is also currently in physical therapy which is providing her some relief      I have personally reviewed and/or updated the patient's past medical history, past surgical history, family history, social history, current medications, allergies, and vital signs today  Review of Systems   Respiratory: Negative for shortness of breath  Cardiovascular: Negative for chest pain  Gastrointestinal: Negative for constipation, diarrhea, nausea and vomiting  Musculoskeletal: Positive for back pain, joint swelling and neck pain  Negative for arthralgias, gait problem and myalgias  Skin: Negative for rash  Neurological: Negative for dizziness, seizures and weakness  All other systems reviewed and are negative        Patient Active Problem List   Diagnosis   • Vitamin D deficiency   • Spinal stenosis of lumbar region with neurogenic claudication   • Osteopenia of multiple sites   • Mixed hyperlipidemia   • Hypertension, essential   • Cervical spondylosis without myelopathy   • Cervical radiculopathy   • History of colon polyps   • Anxiety   • Other insomnia   • Non-seasonal allergic rhinitis   • Overweight   • Major depressive disorder with single episode, [facility]   • Intervertebral disc disorder with radiculopathy of lumbar region       Past Medical History:   Diagnosis Date   • Anxiety 07/29/2022   • Cervical radiculopathy 11/01/2018   • Cervical spondylosis without myelopathy 11/01/2018   • Diverticulosis 03/14/2023   • History of colon polyps 07/29/2022   • Hypertension, essential 10/16/2019   • Internal hemorrhoids 03/14/2023   • Mixed hyperlipidemia 07/11/2018   • Non-seasonal allergic rhinitis    • Osteopenia of multiple sites 07/24/2019   • Other insomnia 07/29/2022   • Overweight    • Spinal stenosis of lumbar region with neurogenic claudication 10/22/2021   • Vitamin D deficiency 12/08/2020       Past Surgical History:   Procedure Laterality Date   • BREAST EXCISIONAL BIOPSY Left 1991   • BREAST EXCISIONAL BIOPSY Left     benign   • EGD  02/26/2018   • LAMINECTOMY AND MICRODISCECTOMY LUMBAR SPINE         Family History   Adopted: Yes   Problem Relation Age of Onset   • No Known Problems Mother         Estranged   • No Known Problems Father         Estranged   • No Known Problems Daughter    • No Known Problems Daughter    • No Known Problems Son    • Asperger's syndrome Son    • Autism spectrum disorder Son    • Bipolar disorder Son    • Anxiety disorder Son        Social History     Occupational History   • Occupation: Director of Nursing for Acute Care     Employer: [employer]   Tobacco Use   • Smoking status: Never   • Smokeless tobacco: Never   Vaping Use   • Vaping Use: Never used   Substance and Sexual Activity   • Alcohol use:  Yes     Alcohol/week: 8 0 standard drinks     Types: 8 Glasses of wine per week   • Drug use: Never   • Sexual activity: Yes     Partners: Male     Birth control/protection: Male Sterilization, Post-menopausal     Comment: Vasectomy       Current Outpatient Medications on File Prior to Visit   Medication Sig   • atorvastatin (LIPITOR) 10 mg tablet Take 1 tablet (10 mg total) by mouth daily at bedtime   • b complex vitamins capsule Take 1 capsule by mouth daily   • calcium gluconate 500 mg tablet Take 1,000 mg by mouth daily   • cholecalciferol (VITAMIN D3) 1,000 units tablet Take 1,000 Units by mouth daily   • estrogens, conjugated (Premarin) vaginal cream Insert 1 g into the vagina 2 (two) times a week At bedtime   • fluticasone (FLONASE) 50 mcg/act nasal spray 2 sprays into each nostril daily OTC   • losartan (COZAAR) 50 mg tablet Take 1 tablet (50 mg total) by mouth daily   • Multiple Vitamin (multivitamin) tablet Take 1 tablet by mouth daily   • Omega-3 Fatty Acids (fish oil) 1,000 mg Take 1,000 mg by mouth daily   • Probiotic Product (PROBIOTIC-10 PO) Take 1 tablet by mouth in the morning   • traZODone (DESYREL) 100 mg tablet Take 0 5 tablets (50 mg total) by mouth daily at bedtime as needed for sleep   • zinc gluconate 50 mg tablet Take 50 mg by mouth daily   • montelukast (SINGULAIR) 10 mg "tablet Take 10 mg by mouth daily at bedtime (Patient not taking: Reported on 4/24/2023)   • [DISCONTINUED] methocarbamol (ROBAXIN) 500 mg tablet Take 1 tablet (500 mg total) by mouth 3 (three) times a day as needed for muscle spasms (Patient not taking: Reported on 4/24/2023)     No current facility-administered medications on file prior to visit  No Known Allergies    Physical Exam:    /92   Pulse 70   Resp 18   Ht 5' 5\" (1 651 m)   Wt 73 5 kg (162 lb)   LMP 01/01/2015 (Within Years)   BMI 26 96 kg/m²     Constitutional:normal, well developed, well nourished, alert, in no distress and non-toxic and no overt pain behavior    Eyes:anicteric  HEENT:grossly intact  Neck:supple, symmetric, trachea midline and no masses   Pulmonary:even and unlabored  Cardiovascular:No edema or pitting edema present  Skin:Normal without rashes or lesions and well hydrated  Psychiatric:Mood and affect appropriate  Neurologic:Cranial Nerves II-XII grossly intact  Musculoskeletal:normal     Cervical Spine Exam    Appearance:  Normal lordosis  Palpation/Tenderness:  no tenderness or spasm  Sensory:  no sensory deficits noted  Range of Motion:  Flexion:  Minimally limited  with pain  Extension:  Minimally limited  with pain  Rotation - Left:  Minimally limited  with pain  Rotation - Right:  Minimally limited  with pain  Motor Strength:  Left Arm Flexion  5/5  Left Arm Extension  5/5  Right Arm Flexion  5/5  Right Arm Extension  5/5  Left    5/5  Right   5/5      Imaging    "

## 2023-04-27 ENCOUNTER — HOSPITAL ENCOUNTER (OUTPATIENT)
Dept: RADIOLOGY | Facility: CLINIC | Age: 63
Discharge: HOME | End: 2023-04-27
Admitting: ANESTHESIOLOGY

## 2023-04-27 VITALS
RESPIRATION RATE: 18 BRPM | HEART RATE: 65 BPM | OXYGEN SATURATION: 95 % | TEMPERATURE: 98.2 F | DIASTOLIC BLOOD PRESSURE: 73 MMHG | SYSTOLIC BLOOD PRESSURE: 115 MMHG

## 2023-04-27 DIAGNOSIS — M54.16 LUMBAR RADICULOPATHY: ICD-10-CM

## 2023-04-27 RX ORDER — METHYLPREDNISOLONE ACETATE 80 MG/ML
80 INJECTION, SUSPENSION INTRA-ARTICULAR; INTRALESIONAL; INTRAMUSCULAR; PARENTERAL; SOFT TISSUE ONCE
Status: COMPLETED | OUTPATIENT
Start: 2023-04-27 | End: 2023-04-27

## 2023-04-27 RX ORDER — 0.9 % SODIUM CHLORIDE 0.9 %
4 VIAL (ML) INJECTION ONCE
Status: COMPLETED | OUTPATIENT
Start: 2023-04-27 | End: 2023-04-27

## 2023-04-27 RX ORDER — BUPIVACAINE HCL/PF 2.5 MG/ML
2 VIAL (ML) INJECTION ONCE
Status: COMPLETED | OUTPATIENT
Start: 2023-04-27 | End: 2023-04-27

## 2023-04-27 RX ADMIN — METHYLPREDNISOLONE ACETATE 80 MG: 80 INJECTION, SUSPENSION INTRA-ARTICULAR; INTRALESIONAL; INTRAMUSCULAR; PARENTERAL; SOFT TISSUE at 14:55

## 2023-04-27 RX ADMIN — IOHEXOL 1 ML: 300 INJECTION, SOLUTION INTRAVENOUS at 14:54

## 2023-04-27 RX ADMIN — Medication 4 ML: at 14:52

## 2023-04-27 RX ADMIN — BUPIVACAINE HYDROCHLORIDE 2 ML: 2.5 INJECTION, SOLUTION EPIDURAL; INFILTRATION; INTRACAUDAL at 14:55

## 2023-04-27 NOTE — H&P
History of Present Illness:  The patient is a 60 y o  female who presents with complaints of right lower back and leg pain and is here today for right L4-5 transforaminal epidural steroid injection    Past Medical History:   Diagnosis Date   • Anxiety 07/29/2022   • Cervical radiculopathy 11/01/2018   • Cervical spondylosis without myelopathy 11/01/2018   • Diverticulosis 03/14/2023   • History of colon polyps 07/29/2022   • Hypertension, essential 10/16/2019   • Internal hemorrhoids 03/14/2023   • Mixed hyperlipidemia 07/11/2018   • Non-seasonal allergic rhinitis    • Osteopenia of multiple sites 07/24/2019   • Other insomnia 07/29/2022   • Overweight    • Spinal stenosis of lumbar region with neurogenic claudication 10/22/2021   • Vitamin D deficiency 12/08/2020       Past Surgical History:   Procedure Laterality Date   • BREAST EXCISIONAL BIOPSY Left 1991   • BREAST EXCISIONAL BIOPSY Left     benign   • EGD  02/26/2018   • LAMINECTOMY AND MICRODISCECTOMY LUMBAR SPINE           Current Outpatient Medications:   •  atorvastatin (LIPITOR) 10 mg tablet, Take 1 tablet (10 mg total) by mouth daily at bedtime, Disp: 90 tablet, Rfl: 0  •  b complex vitamins capsule, Take 1 capsule by mouth daily, Disp: , Rfl:   •  calcium gluconate 500 mg tablet, Take 1,000 mg by mouth daily, Disp: , Rfl:   •  cholecalciferol (VITAMIN D3) 1,000 units tablet, Take 1,000 Units by mouth daily, Disp: , Rfl:   •  estrogens, conjugated (Premarin) vaginal cream, Insert 1 g into the vagina 2 (two) times a week At bedtime, Disp: 30 g, Rfl: 2  •  fluticasone (FLONASE) 50 mcg/act nasal spray, 2 sprays into each nostril daily OTC, Disp: , Rfl:   •  losartan (COZAAR) 50 mg tablet, Take 1 tablet (50 mg total) by mouth daily, Disp: 90 tablet, Rfl: 1  •  methocarbamol (ROBAXIN) 500 mg tablet, Take 1 tablet (500 mg total) by mouth 3 (three) times a day as needed for muscle spasms, Disp: 90 tablet, Rfl: 3  •  montelukast (SINGULAIR) 10 mg tablet, Take 10 mg by mouth daily at bedtime (Patient not taking: Reported on 4/24/2023), Disp: , Rfl:   •  Multiple Vitamin (multivitamin) tablet, Take 1 tablet by mouth daily, Disp: , Rfl:   •  Omega-3 Fatty Acids (fish oil) 1,000 mg, Take 1,000 mg by mouth daily, Disp: , Rfl:   •  Probiotic Product (PROBIOTIC-10 PO), Take 1 tablet by mouth in the morning, Disp: , Rfl:   •  traZODone (DESYREL) 100 mg tablet, Take 0 5 tablets (50 mg total) by mouth daily at bedtime as needed for sleep, Disp: 90 tablet, Rfl: 0  •  zinc gluconate 50 mg tablet, Take 50 mg by mouth daily, Disp: , Rfl:     Current Facility-Administered Medications:   •  bupivacaine (PF) (MARCAINE) 0 25 % injection 2 mL, 2 mL, Epidural, Once, Yadi Haynes MD  •  iohexol (OMNIPAQUE) 300 mg/mL injection 1 mL, 1 mL, Epidural, Once, Yadi Haynes MD  •  lidocaine (PF) (XYLOCAINE-MPF) 2 % injection 4 mL, 4 mL, Infiltration, Once, Yadi Haynes MD  •  methylPREDNISolone acetate (DEPO-MEDROL) injection 80 mg, 80 mg, Epidural, Once, Yadi Haynes MD  •  sodium chloride (PF) 0 9 % injection 4 mL, 4 mL, Infiltration, Once, Yadi Haynes MD    No Known Allergies    Physical Exam:   Vitals:    04/27/23 1433   BP: 127/77   Pulse: 78   Resp: 18   Temp: 98 2 °F (36 8 °C)   SpO2: 98%     General: Awake, Alert, Oriented x 3, Mood and affect appropriate  Respiratory: Respirations even and unlabored  Cardiovascular: Peripheral pulses intact; no edema  Musculoskeletal Exam: Right lower back pain    ASA Score: 2    Patient/Chart Verification  Patient ID Verified: Verbal  ID Band Applied: No  Consents Confirmed: Procedural  H&P( within 30 days) Verified: To be obtained in the Pre-Procedure area  Allergies Reviewed: Yes  Anticoag/NSAID held?: No  Currently on antibiotics?: No    Assessment:   1   Lumbar radiculopathy        Plan: Rt L4-L5 TFESI

## 2023-04-27 NOTE — DISCHARGE INSTR - LAB
Epidural Steroid Injection   WHAT YOU NEED TO KNOW:   An epidural steroid injection (DINORAH) is a procedure to inject steroid medicine into the epidural space  The epidural space is between your spinal cord and vertebrae  Steroids reduce inflammation and fluid buildup in your spine that may be causing pain  You may be given pain medicine along with the steroids  ACTIVITY  Do not drive or operate machinery today  No strenuous activity today - bending, lifting, etc   You may resume normal activites starting tomorrow - start slowly and as tolerated  You may shower today, but no tub baths or hot tubs  You may have numbness for several hours from the local anesthetic  Please use caution and common sense, especially with weight-bearing activities  CARE OF THE INJECTION SITE  If you have soreness or pain, apply ice to the area today (20 minutes on/20 minutes off)  Starting tomorrow, you may use warm, moist heat or ice if needed  You may have an increase or change in your discomfort for 36-48 hours after your treatment  Apply ice and continue with any pain medication you have been prescribed  Notify [facility] if you have any of the following: redness, drainage, swelling, headache, stiff neck or fever above 100°F     SPECIAL INSTRUCTIONS  Our office will contact you in approximately 7 days for a progress report  MEDICATIONS  Continue to take all routine medications  Our office may have instructed you to hold some medications  As no general anesthesia was used in today's procedure, you should not experience any side effects related to anesthesia  If you are diabetic, the steroids used in today's injection may temporarily increase your blood sugar levels after the first few days after your injection  Please keep a close eye on your sugars and alert the doctor who manages your diabetes if your sugars are significantly high from your baseline or you are symptomatic       If you have a problem specifically related to your procedure, please call our office at [phone]  Problems not related to your procedure should be directed to your primary care physician

## 2023-05-03 ENCOUNTER — TELEPHONE (OUTPATIENT)
Dept: PAIN MEDICINE | Facility: CLINIC | Age: 63
End: 2023-05-03

## 2023-05-03 NOTE — TELEPHONE ENCOUNTER
Left message on patient voicemail, unable to move appt up, last procedure was 4/27/23, 5/11 is two weeks after, that is the soonest we can do the next procedure

## 2023-05-04 ENCOUNTER — TELEPHONE (OUTPATIENT)
Dept: OBGYN CLINIC | Facility: HOSPITAL | Age: 63
End: 2023-05-04

## 2023-05-09 NOTE — PROGRESS NOTES
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 [address]  Going to [employer] in Aug, not much vacation time at new job  Had a lot a her previous job  Was at [employer] 30 years and job was elliminated with Janell Salomon with emtional eating  Always taking care of everyone else  7 5 hours of sleep, good quality  Swimming 3x per week, walks 30-45 minutes 1x per week, elliptical 20 minutes 1x per week, and yoga for activity  Not skipping meals, but eating \"on the go\", not taking time for self care  Was adopted, miscarriage 30 years ago,  at 22,  over 36 years now  Dad  at 64 of cancer and mom  at 79 of respitory failure  Mother in law passed 2023- now questioning choices, feels guilty that she couldn't care for her  Very concerned about death now  Patient to follow up with SW in one month    Nadeem Boyer LCSW      "

## 2023-05-10 ENCOUNTER — OFFICE VISIT (OUTPATIENT)
Dept: BARIATRICS | Facility: CLINIC | Age: 63
End: 2023-05-10

## 2023-05-10 DIAGNOSIS — R63.5 ABNORMAL WEIGHT GAIN: ICD-10-CM

## 2023-05-10 DIAGNOSIS — F43.21 GRIEF REACTION: Primary | ICD-10-CM

## 2023-05-11 ENCOUNTER — HOSPITAL ENCOUNTER (OUTPATIENT)
Dept: RADIOLOGY | Facility: CLINIC | Age: 63
Discharge: HOME | End: 2023-05-11

## 2023-05-11 VITALS
DIASTOLIC BLOOD PRESSURE: 57 MMHG | TEMPERATURE: 98.3 F | RESPIRATION RATE: 17 BRPM | OXYGEN SATURATION: 100 % | HEART RATE: 70 BPM | SYSTOLIC BLOOD PRESSURE: 123 MMHG

## 2023-05-11 DIAGNOSIS — M54.12 CERVICAL RADICULOPATHY: ICD-10-CM

## 2023-05-11 RX ORDER — METHYLPREDNISOLONE ACETATE 80 MG/ML
80 INJECTION, SUSPENSION INTRA-ARTICULAR; INTRALESIONAL; INTRAMUSCULAR; PARENTERAL; SOFT TISSUE ONCE
Status: COMPLETED | OUTPATIENT
Start: 2023-05-11 | End: 2023-05-11

## 2023-05-11 RX ADMIN — METHYLPREDNISOLONE ACETATE 80 MG: 80 INJECTION, SUSPENSION INTRA-ARTICULAR; INTRALESIONAL; INTRAMUSCULAR; PARENTERAL; SOFT TISSUE at 09:42

## 2023-05-11 RX ADMIN — IOHEXOL 1 ML: 300 INJECTION, SOLUTION INTRAVENOUS at 09:42

## 2023-05-11 NOTE — DISCHARGE INSTR - LAB
Epidural Steroid Injection   WHAT YOU NEED TO KNOW:   An epidural steroid injection (DINORAH) is a procedure to inject steroid medicine into the epidural space  The epidural space is between your spinal cord and vertebrae  Steroids reduce inflammation and fluid buildup in your spine that may be causing pain  You may be given pain medicine along with the steroids  ACTIVITY  Do not drive or operate machinery today  No strenuous activity today - bending, lifting, etc   You may resume normal activites starting tomorrow - start slowly and as tolerated  You may shower today, but no tub baths or hot tubs  You may have numbness for several hours from the local anesthetic  Please use caution and common sense, especially with weight-bearing activities  CARE OF THE INJECTION SITE  If you have soreness or pain, apply ice to the area today (20 minutes on/20 minutes off)  Starting tomorrow, you may use warm, moist heat or ice if needed  You may have an increase or change in your discomfort for 36-48 hours after your treatment  Apply ice and continue with any pain medication you have been prescribed  Notify [facility] if you have any of the following: redness, drainage, swelling, headache, stiff neck or fever above 100°F     SPECIAL INSTRUCTIONS  Our office will contact you in approximately 7 days for a progress report  MEDICATIONS  Continue to take all routine medications  Our office may have instructed you to hold some medications  As no general anesthesia was used in today's procedure, you should not experience any side effects related to anesthesia  If you are diabetic, the steroids used in today's injection may temporarily increase your blood sugar levels after the first few days after your injection  Please keep a close eye on your sugars and alert the doctor who manages your diabetes if your sugars are significantly high from your baseline or you are symptomatic       If you have a problem specifically related to your procedure, please call our office at [phone]  Problems not related to your procedure should be directed to your primary care physician

## 2023-05-11 NOTE — H&P
History of Present Illness:  The patient is a 60 y o  female who presents with complaints of neck pain and is here today for cervical epidural steroid injection    Past Medical History:   Diagnosis Date   • Anxiety 07/29/2022   • Cervical radiculopathy 11/01/2018   • Cervical spondylosis without myelopathy 11/01/2018   • Diverticulosis 03/14/2023   • History of colon polyps 07/29/2022   • Hypertension, essential 10/16/2019   • Internal hemorrhoids 03/14/2023   • Mixed hyperlipidemia 07/11/2018   • Non-seasonal allergic rhinitis    • Osteopenia of multiple sites 07/24/2019   • Other insomnia 07/29/2022   • Overweight    • Spinal stenosis of lumbar region with neurogenic claudication 10/22/2021   • Vitamin D deficiency 12/08/2020       Past Surgical History:   Procedure Laterality Date   • BREAST EXCISIONAL BIOPSY Left 1991   • BREAST EXCISIONAL BIOPSY Left     benign   • EGD  02/26/2018   • LAMINECTOMY AND MICRODISCECTOMY LUMBAR SPINE           Current Outpatient Medications:   •  atorvastatin (LIPITOR) 10 mg tablet, Take 1 tablet (10 mg total) by mouth daily at bedtime, Disp: 90 tablet, Rfl: 0  •  b complex vitamins capsule, Take 1 capsule by mouth daily, Disp: , Rfl:   •  calcium gluconate 500 mg tablet, Take 1,000 mg by mouth daily, Disp: , Rfl:   •  cholecalciferol (VITAMIN D3) 1,000 units tablet, Take 1,000 Units by mouth daily, Disp: , Rfl:   •  estrogens, conjugated (Premarin) vaginal cream, Insert 1 g into the vagina 2 (two) times a week At bedtime, Disp: 30 g, Rfl: 2  •  fluticasone (FLONASE) 50 mcg/act nasal spray, 2 sprays into each nostril daily OTC, Disp: , Rfl:   •  losartan (COZAAR) 50 mg tablet, Take 1 tablet (50 mg total) by mouth daily, Disp: 90 tablet, Rfl: 1  •  methocarbamol (ROBAXIN) 500 mg tablet, Take 1 tablet (500 mg total) by mouth 3 (three) times a day as needed for muscle spasms, Disp: 90 tablet, Rfl: 3  •  montelukast (SINGULAIR) 10 mg tablet, Take 10 mg by mouth daily at bedtime (Patient not taking: Reported on 4/24/2023), Disp: , Rfl:   •  Multiple Vitamin (multivitamin) tablet, Take 1 tablet by mouth daily, Disp: , Rfl:   •  Omega-3 Fatty Acids (fish oil) 1,000 mg, Take 1,000 mg by mouth daily, Disp: , Rfl:   •  Probiotic Product (PROBIOTIC-10 PO), Take 1 tablet by mouth in the morning, Disp: , Rfl:   •  traZODone (DESYREL) 100 mg tablet, Take 0 5 tablets (50 mg total) by mouth daily at bedtime as needed for sleep, Disp: 90 tablet, Rfl: 0  •  zinc gluconate 50 mg tablet, Take 50 mg by mouth daily, Disp: , Rfl:     Current Facility-Administered Medications:   •  iohexol (OMNIPAQUE) 300 mg/mL injection 1 mL, 1 mL, Epidural, Once, Ameya De La Torre MD  •  methylPREDNISolone acetate (DEPO-MEDROL) injection 80 mg, 80 mg, Epidural, Once, Ameya De La Torre MD    No Known Allergies    Physical Exam:   Vitals:    05/11/23 0926   BP: 126/75   Pulse: 73   Resp: 18   Temp: 98 3 °F (36 8 °C)   SpO2: 98%     General: Awake, Alert, Oriented x 3, Mood and affect appropriate  Respiratory: Respirations even and unlabored  Cardiovascular: Peripheral pulses intact; no edema  Musculoskeletal Exam: Neck pain    ASA Score: 2    Patient/Chart Verification  Patient ID Verified: Verbal  ID Band Applied: No  Consents Confirmed: Procedural, To be obtained in the Pre-Procedure area  H&P( within 30 days) Verified: To be obtained in the Pre-Procedure area  Allergies Reviewed: Yes  Anticoag/NSAID held?: No (advil 2 pm 5/10 - FQ informed, denies other nsaids denies blood thinners)  Currently on antibiotics?: No    Assessment:   1   Cervical radiculopathy        Plan: JOELLE

## 2023-05-15 ENCOUNTER — OFFICE VISIT (OUTPATIENT)
Age: 63
End: 2023-05-15

## 2023-05-15 VITALS
DIASTOLIC BLOOD PRESSURE: 86 MMHG | WEIGHT: 162 LBS | HEART RATE: 70 BPM | SYSTOLIC BLOOD PRESSURE: 130 MMHG | BODY MASS INDEX: 26.99 KG/M2 | HEIGHT: 65 IN

## 2023-05-15 DIAGNOSIS — G89.29 CHRONIC BILATERAL LOW BACK PAIN WITH LEFT-SIDED SCIATICA: ICD-10-CM

## 2023-05-15 DIAGNOSIS — M54.2 NECK PAIN: Primary | ICD-10-CM

## 2023-05-15 DIAGNOSIS — M79.18 MYOFASCIAL PAIN: ICD-10-CM

## 2023-05-15 DIAGNOSIS — M54.12 CERVICAL RADICULOPATHY: ICD-10-CM

## 2023-05-15 DIAGNOSIS — M54.42 CHRONIC BILATERAL LOW BACK PAIN WITH LEFT-SIDED SCIATICA: ICD-10-CM
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
## 2023-05-18 ENCOUNTER — TELEPHONE (OUTPATIENT)
Dept: PAIN MEDICINE | Facility: CLINIC | Age: 63
End: 2023-05-18

## 2023-05-26 ENCOUNTER — PROCEDURE VISIT (OUTPATIENT)
Age: 63
End: 2023-05-26

## 2023-05-26 VITALS
SYSTOLIC BLOOD PRESSURE: 124 MMHG | HEART RATE: 60 BPM | HEIGHT: 65 IN | DIASTOLIC BLOOD PRESSURE: 80 MMHG | BODY MASS INDEX: 26.96 KG/M2

## 2023-05-26 DIAGNOSIS — G89.29 CHRONIC BILATERAL LOW BACK PAIN WITH LEFT-SIDED SCIATICA: ICD-10-CM

## 2023-05-26 DIAGNOSIS — M54.42 CHRONIC BILATERAL LOW BACK PAIN WITH LEFT-SIDED SCIATICA: ICD-10-CM

## 2023-05-26 DIAGNOSIS — M79.18 MYOFASCIAL PAIN: ICD-10-CM

## 2023-05-26 DIAGNOSIS — M54.12 CERVICAL RADICULOPATHY: ICD-10-CM

## 2023-05-26 DIAGNOSIS — M54.2 NECK PAIN: Primary | ICD-10-CM

## 2023-05-26 NOTE — PROGRESS NOTES
HPI:  Teri Treadwell returns for treatment today she reports ongoing symptomatology did actually feel somewhat more loose after her first intervention in regards to the neck and upper back  Today she reports a good deal of low back discomfort and pain tightness down into the legs her neck feels painful as well  She has had a very busy week  The following portions of the patient's history were reviewed and updated as appropriate: allergies, current medications, past family history, past medical history, past social history, past surgical history, and problem list     Review of Systems    Physical Exam:  Exam reveals a visible pelvic obliquity elevated left versus right innominate leg with inequality misalignment of the left innominate on sacrum sacral base rotation on the right biomechanically joint dysfunction left SI L5-S1 motion unit parasacral tenderness noted on the left  Active triggers of myofascial involvement bilateral trapezii, rhomboid, levator scapula musculature joint dysfunction C5-C6    Assessment:   Diagnosis ICD-10-CM Associated Orders   1  Neck pain  M54 2       2  Myofascial pain  M79 18       3  Chronic bilateral low back pain with left-sided sciatica  M54 42     G89 29       4  Cervical radiculopathy  M54 12                 Treatment: 82075  Myofascial release to the bilateral shoulder girdles utilizing Graston technique  I used GT 2, GT 3, and GT 4 instruments  Scap thoracic mobilizations performed  Fascial release bilateral trapezii lever scapula rhomboid also  This is a 12-minute procedure  Patient tolerated well  Manipulation to the left innominate, sacrum, L5 via flexion distraction technique as well as Velasquez drop maneuver well-tolerated  Manipulation C5-C6 via long axis traction maneuver well-tolerated  Discussion:  Continue flexion-based stretching daily icing see her back for follow-up

## 2023-05-31 ENCOUNTER — PROCEDURE VISIT (OUTPATIENT)
Age: 63
End: 2023-05-31
Payer: COMMERCIAL

## 2023-05-31 ENCOUNTER — TELEPHONE (OUTPATIENT)
Dept: PAIN MEDICINE | Facility: CLINIC | Age: 63
End: 2023-05-31

## 2023-05-31 VITALS
HEART RATE: 77 BPM | DIASTOLIC BLOOD PRESSURE: 84 MMHG | BODY MASS INDEX: 26.96 KG/M2 | SYSTOLIC BLOOD PRESSURE: 134 MMHG | OXYGEN SATURATION: 98 % | HEIGHT: 65 IN

## 2023-05-31 DIAGNOSIS — G89.29 CHRONIC BILATERAL LOW BACK PAIN WITH LEFT-SIDED SCIATICA: ICD-10-CM

## 2023-05-31 DIAGNOSIS — M54.42 CHRONIC BILATERAL LOW BACK PAIN WITH LEFT-SIDED SCIATICA: ICD-10-CM

## 2023-05-31 DIAGNOSIS — M54.12 CERVICAL RADICULOPATHY: ICD-10-CM

## 2023-05-31 DIAGNOSIS — M54.2 NECK PAIN: Primary | ICD-10-CM

## 2023-05-31 DIAGNOSIS — M79.18 MYOFASCIAL PAIN: ICD-10-CM

## 2023-05-31 PROCEDURE — 97035 APP MDLTY 1+ULTRASOUND EA 15: CPT | Performed by: CHIROPRACTOR

## 2023-05-31 PROCEDURE — 98941 CHIROPRACT MANJ 3-4 REGIONS: CPT | Performed by: CHIROPRACTOR

## 2023-05-31 NOTE — TELEPHONE ENCOUNTER
Caller: pt    Doctor: Lyn Arroyo    Reason for call: pt wants to know if she is going to have limitations after these injections?     Call back#: [phone]

## 2023-05-31 NOTE — TELEPHONE ENCOUNTER
S/w pt who is s/p JOELLE on 5/11/23  Pt reported some relief of back spasms at 1 week f/u  Pt now states that the spasms are worse in her mid upper back and she turned in bed last night ,feeling something crack in her neck and now has limited ROM of her neck  Pt is using methocarbamol, motrin, Voltaren cream and heat/ice with very minimal relief  Pt has chiro appt today and will go for eval     Pt frustrated with pain and that the JOELLE did not work  Pt is requesting if steroid pack would help? Advised will notify FQ and cb with rec

## 2023-05-31 NOTE — TELEPHONE ENCOUNTER
Patient phoned, still having pain, does not feel the JOELLE did much this time  Would like to discuss other options

## 2023-05-31 NOTE — TELEPHONE ENCOUNTER
S/w pt who is requesting to reschedule her TPI  Pt is going away for her 43 nd anniversary this weekend and will be in a pool  Advised she would be contacted to reschedule   Thank you

## 2023-05-31 NOTE — TELEPHONE ENCOUNTER
I think I should probably do trigger point injections to bring down the spasms    If amenable, please send to Jayshree Foster to schedule and to expedite

## 2023-05-31 NOTE — PROGRESS NOTES
"      HPI:    Sheldon Driscoll returns for treatment today reports good deal of discomfort in her neck upper back 7 on a visual pain analogscale  Woke up with severe stiffness, slept wrong  Low back feels \"weak\" but no significant pain  The following portions of the patient's history were reviewed and updated as appropriate: allergies, current medications, past family history, past medical history, past social history, past surgical history, and problem list     Review of Systems    Physical Exam:  Exam reveals a visible pelvic obliquity elevated left versus right innominate leg with inequality misalignment of the left innominate on sacrum sacral base rotation on the right biomechanically joint dysfunction left SI L5-S1 motion unit parasacral tenderness noted on the left  Active triggers of myofascial involvement bilateral trapezii, rhomboid, levator scapula musculature joint dysfunction C5-C6    Assessment:   Diagnosis ICD-10-CM Associated Orders   1  Neck pain  M54 2       2  Myofascial pain  M79 18       3  Chronic bilateral low back pain with left-sided sciatica  M54 42     G89 29       4  Cervical radiculopathy  M54 12                 Treatment: 84835  Ultrasound 1 2 w/cm2 to B/L paracervical spine  8 minute procedure  Manipulation to the left innominate, sacrum, L5 via flexion distraction technique as well as Velasquez drop maneuver well-tolerated  Manipulation C5-C6 via long axis traction maneuver in the seated position, well-tolerated  Discussion:  Continue flexion-based stretching daily icing see her back for follow-up    "

## 2023-06-01 ENCOUNTER — TELEPHONE (OUTPATIENT)
Dept: PAIN MEDICINE | Facility: MEDICAL CENTER | Age: 63
End: 2023-06-01

## 2023-06-01 ENCOUNTER — TELEPHONE (OUTPATIENT)
Dept: RADIOLOGY | Facility: CLINIC | Age: 63
End: 2023-06-01

## 2023-06-01 DIAGNOSIS — M54.12 CERVICAL RADICULOPATHY: Primary | ICD-10-CM

## 2023-06-01 RX ORDER — METHYLPREDNISOLONE 4 MG/1
TABLET ORAL
Qty: 21 TABLET | Refills: 0 | Status: SHIPPED | OUTPATIENT
Start: 2023-06-01

## 2023-06-01 NOTE — TELEPHONE ENCOUNTER
Medrol Dosepak sent to [facility]    Please schedule TPI for Monday but if she is feeling better she may postpone

## 2023-06-01 NOTE — TELEPHONE ENCOUNTER
Caller: Odalys Salinas     Doctor: Dr Dhaliwal     Reason for call: Patient calling asking for procedure instructions just to be clear please advise.    Call back#: [phone]

## 2023-06-01 NOTE — TELEPHONE ENCOUNTER
Patient informed of Dosepak sent to pharmacy per Dr Judy Ríos  Patient scheduled for TPI on Monday morning  Patient appreciative

## 2023-06-01 NOTE — TELEPHONE ENCOUNTER
Patient aware that Daphne Morse was sent to pharmacy  Patient appreciative  Patient scheduled for Monday morning

## 2023-06-01 NOTE — TELEPHONE ENCOUNTER
Patient called to see if she could get in to see Dr David Francis today for back/neck spasm, unfortunately we have no openings left today and as patient explained she feels that Dr David Francis will refer her back to Pain Management where she is already a patient  I spoke with Rochelle Bull from [facility] and Rochelle Bull spoke with Mikaela Hendrix and Dr Sagar Fernández and they will take care of the patient as she is scheduled for a trigger injection on 6/2/23

## 2023-06-05 ENCOUNTER — PROCEDURE VISIT (OUTPATIENT)
Dept: PAIN MEDICINE | Facility: CLINIC | Age: 63
End: 2023-06-05
Payer: COMMERCIAL

## 2023-06-05 VITALS
HEART RATE: 59 BPM | SYSTOLIC BLOOD PRESSURE: 134 MMHG | BODY MASS INDEX: 26.96 KG/M2 | DIASTOLIC BLOOD PRESSURE: 81 MMHG | WEIGHT: 162 LBS

## 2023-06-05 DIAGNOSIS — M79.18 MYOFASCIAL PAIN SYNDROME: Primary | ICD-10-CM

## 2023-06-05 PROCEDURE — 20553 NJX 1/MLT TRIGGER POINTS 3/>: CPT | Performed by: ANESTHESIOLOGY

## 2023-06-05 RX ORDER — BUPIVACAINE HYDROCHLORIDE 2.5 MG/ML
10 INJECTION, SOLUTION EPIDURAL; INFILTRATION; INTRACAUDAL ONCE
Status: COMPLETED | OUTPATIENT
Start: 2023-06-05 | End: 2023-06-05

## 2023-06-05 RX ADMIN — BUPIVACAINE HYDROCHLORIDE 10 ML: 2.5 INJECTION, SOLUTION EPIDURAL; INFILTRATION; INTRACAUDAL at 07:45

## 2023-06-05 NOTE — PROGRESS NOTES
Pre-procedure Diagnosis:   Myofascial pain  Post-procedure Diagnosis:   Myofascial pain  Operation Title(s):  Trigger point injections into left trapezius x2, left splenius capitis x2, right thoracic paraspinal x3  Attending Surgeon:   Evi Hardin MD  Anesthesia:   Local    Indications: The patient is a 60y o  year-old female with a diagnosis of myofascial pain  The patient's history and physical exam were reviewed  The risks, benefits and alternatives to the procedure were discussed, and all questions were answered to the patient's satisfaction  The patient agreed to proceed, and written informed consent was obtained  Procedure in Detail: The patient was brought into the exam room and placed in the sitting position on the exam room chair with the head flexed on a pillow  Trigger points were identified in the: left trapezius x2, left splenius capitis x2, right thoracic paraspinal x3    Areas were cleansed with alcohol  Then, using a dry needling technique, each trigger point was injected with a 1 5 inch 25 gauge needle and 1 mL 0 25% bupivacaine     Disposition: The patient tolerated the procedure well and there were no apparent complications  The patient was given written discharge instructions for the procedure

## 2023-06-08 ENCOUNTER — PROCEDURE VISIT (OUTPATIENT)
Age: 63
End: 2023-06-08
Payer: COMMERCIAL

## 2023-06-08 VITALS — SYSTOLIC BLOOD PRESSURE: 123 MMHG | HEIGHT: 65 IN | BODY MASS INDEX: 26.96 KG/M2 | DIASTOLIC BLOOD PRESSURE: 75 MMHG

## 2023-06-08 DIAGNOSIS — M79.18 MYOFASCIAL PAIN: ICD-10-CM

## 2023-06-08 DIAGNOSIS — M54.12 CERVICAL RADICULOPATHY: ICD-10-CM

## 2023-06-08 DIAGNOSIS — M54.42 CHRONIC BILATERAL LOW BACK PAIN WITH LEFT-SIDED SCIATICA: ICD-10-CM

## 2023-06-08 DIAGNOSIS — M54.2 NECK PAIN: Primary | ICD-10-CM

## 2023-06-08 DIAGNOSIS — G89.29 CHRONIC BILATERAL LOW BACK PAIN WITH LEFT-SIDED SCIATICA: ICD-10-CM

## 2023-06-08 PROCEDURE — 97140 MANUAL THERAPY 1/> REGIONS: CPT | Performed by: CHIROPRACTOR

## 2023-06-08 PROCEDURE — 98941 CHIROPRACT MANJ 3-4 REGIONS: CPT | Performed by: CHIROPRACTOR

## 2023-06-08 NOTE — PROGRESS NOTES
HPI:    Vika Garcia returns for treatment today of neck and low back pain  She reports overall that her neck is feeling better  Reports no pain in the low back  VPAS  3-5      The following portions of the patient's history were reviewed and updated as appropriate: allergies, current medications, past family history, past medical history, past social history, past surgical history, and problem list     Review of Systems    Physical Exam:  Exam reveals a visible pelvic obliquity elevated left versus right innominate leg with inequality misalignment of the left innominate on sacrum sacral base rotation on the right biomechanically joint dysfunction left SI L5-S1 motion unit parasacral tenderness noted on the left  Segmental dysfunction T6-T7    Active triggers of myofascial involvement bilateral trapezii, rhomboid, levator scapula musculature joint dysfunction C5-C6    Assessment:   Diagnosis ICD-10-CM Associated Orders   1  Neck pain  M54 2       2  Myofascial pain  M79 18       3  Chronic bilateral low back pain with left-sided sciatica  M54 42     G89 29       4  Cervical radiculopathy  M54 12                 Treatment: 31700  Therapeutic myofascial release performed to the bilateral shoulder girdle  I used Graston technique  I used GT 2, GT 3, and GT 4 instruments  Scap thoracic mobilizations performed and well-tolerated  This was a 12-minute procedure    Manipulation to the left innominate, sacrum, L5 via flexion distraction technique as well as Velasquez drop maneuver well-tolerated  Manipulation to the T6 level producing good joint release well-tolerated manipulation C5-C6 via long axis traction maneuver in the seated position, well-tolerated  Discussion:  Continue flexion-based stretching daily icing see her back for follow-up

## 2023-06-12 ENCOUNTER — TELEPHONE (OUTPATIENT)
Dept: RADIOLOGY | Facility: MEDICAL CENTER | Age: 63
End: 2023-06-12

## 2023-06-12 NOTE — TELEPHONE ENCOUNTER
Patient Reports    Neck 90                                Back 0    %     improvement post injection    Pain Level    Back 5-6 /10                       Neck 2

## 2023-06-28 ENCOUNTER — PROCEDURE VISIT (OUTPATIENT)
Age: 63
End: 2023-06-28
Payer: COMMERCIAL

## 2023-06-28 ENCOUNTER — TELEPHONE (OUTPATIENT)
Age: 63
End: 2023-06-28

## 2023-06-28 VITALS
HEIGHT: 65 IN | BODY MASS INDEX: 26.99 KG/M2 | DIASTOLIC BLOOD PRESSURE: 86 MMHG | HEART RATE: 77 BPM | WEIGHT: 162 LBS | SYSTOLIC BLOOD PRESSURE: 128 MMHG

## 2023-06-28 DIAGNOSIS — M54.42 CHRONIC BILATERAL LOW BACK PAIN WITH LEFT-SIDED SCIATICA: ICD-10-CM

## 2023-06-28 DIAGNOSIS — G89.29 CHRONIC BILATERAL LOW BACK PAIN WITH LEFT-SIDED SCIATICA: ICD-10-CM

## 2023-06-28 DIAGNOSIS — M54.12 CERVICAL RADICULOPATHY: ICD-10-CM

## 2023-06-28 DIAGNOSIS — M79.18 MYOFASCIAL PAIN: ICD-10-CM

## 2023-06-28 DIAGNOSIS — M54.2 NECK PAIN: Primary | ICD-10-CM

## 2023-06-28 PROCEDURE — 97140 MANUAL THERAPY 1/> REGIONS: CPT | Performed by: CHIROPRACTOR

## 2023-06-28 PROCEDURE — 98940 CHIROPRACT MANJ 1-2 REGIONS: CPT | Performed by: CHIROPRACTOR

## 2023-06-28 NOTE — PROGRESS NOTES
HPI:    Robel Harrison returns for treatment today of neck and low back pain  She reports overall that her neck is feeling better  Reports no pain in the low back  Majority of her symptoms seem to be in her upper mid back area  She did have a massage utilizing cupping which gave her a great deal relief  Earlier today she was able to swim which actually made her feel better afterwards as well  Still notes that her symptoms are worse especially upon first awakening in the morning  She denies any lower extremity symptomatology has been doing good to this end  VPAS  3-5      The following portions of the patient's history were reviewed and updated as appropriate: allergies, current medications, past family history, past medical history, past social history, past surgical history, and problem list     Review of Systems    Physical Exam:  Parathoracic hypertonicity is noted with myofascial involvement most specifically in the right parathoracic region  Tightness noted as well  Thoracic right lateral bending and left and right rotation elicits pain  In addition there is  segmental dysfunction T6-T7    Active triggers of myofascial involvement bilateral trapezii, rhomboid, levator scapula musculature joint dysfunction C5-C6    Assessment:   Diagnosis ICD-10-CM Associated Orders   1  Neck pain  M54 2       2  Myofascial pain  M79 18       3  Chronic bilateral low back pain with left-sided sciatica  M54 42     G89 29       4  Cervical radiculopathy  M54 12                 Treatment: 80751  Therapeutic myofascial release performed to the bilateral shoulder girdle  I used Graston technique  I used GT 2, GT 3, and GT 4 instruments  Scap thoracic mobilizations performed and well-tolerated    This was a 12-minute procedure    Manipulation provided to the T6 level producing a good joint release and well-tolerated by the patient today    Discussion:  Continue flexion-based stretching daily icing see her back for follow-up

## 2023-06-29 ENCOUNTER — TELEPHONE (OUTPATIENT)
Age: 63
End: 2023-06-29

## 2023-07-06 ENCOUNTER — OFFICE VISIT (OUTPATIENT)
Age: 63
End: 2023-07-06
Payer: COMMERCIAL

## 2023-07-06 VITALS
DIASTOLIC BLOOD PRESSURE: 88 MMHG | RESPIRATION RATE: 18 BRPM | WEIGHT: 163 LBS | OXYGEN SATURATION: 98 % | BODY MASS INDEX: 27.16 KG/M2 | SYSTOLIC BLOOD PRESSURE: 122 MMHG | TEMPERATURE: 97.7 F | HEART RATE: 74 BPM | HEIGHT: 65 IN

## 2023-07-06 DIAGNOSIS — G44.209 TENSION HEADACHE: Primary | ICD-10-CM

## 2023-07-06 PROCEDURE — 99213 OFFICE O/P EST LOW 20 MIN: CPT | Performed by: PHYSICIAN ASSISTANT

## 2023-07-06 RX ORDER — NAPROXEN 500 MG/1
500 TABLET ORAL 2 TIMES DAILY WITH MEALS
Qty: 60 TABLET | Refills: 0 | Status: SHIPPED | OUTPATIENT
Start: 2023-07-06

## 2023-07-06 RX ORDER — ACETAMINOPHEN 500 MG
500 TABLET ORAL EVERY 6 HOURS PRN
Qty: 60 TABLET | Refills: 0 | Status: SHIPPED | OUTPATIENT
Start: 2023-07-06

## 2023-07-06 NOTE — PROGRESS NOTES
[facility]        NAME: Paulina Brooks is a 60 y.o. female  : 1960    MRN: [id number]  DATE: 2023  TIME: 9:12 AM    Assessment and Plan   Tension headache [G44.209]  1. Tension headache  naproxen (Naprosyn) 500 mg tablet    acetaminophen (TYLENOL) 500 mg tablet            Patient Instructions     Follow up with your PCP and your neurologist in 2-3 days. Proceed to  ER if symptoms worsen. Chief Complaint     Chief Complaint   Patient presents with   • Headache     Patient complains of headache, started 3 days ago. History of Present Illness       59 yo female with c/o a 3 day headache. Pain is 8/10, constant, worse when coughing, turning the head, stabbing, temporal region, slight alleviated with Ibuprofen. Has a history of "brain cyst" being followed up by neurologist. Was in [place] drinking alcohol. Review of Systems   Review of Systems   Constitutional: Negative for activity change, appetite change, fatigue and fever. HENT: Negative for congestion, sinus pressure, sinus pain and sore throat. Eyes: Negative for photophobia and visual disturbance. Respiratory: Negative for cough. Cardiovascular: Negative for chest pain and palpitations. Gastrointestinal: Negative for abdominal pain, diarrhea, nausea and vomiting. Musculoskeletal: Negative for back pain, myalgias and neck stiffness. Skin: Negative for rash. Neurological: Positive for headaches. Negative for dizziness, syncope, weakness and light-headedness.          Current Medications       Current Outpatient Medications:   •  acetaminophen (TYLENOL) 500 mg tablet, Take 1 tablet (500 mg total) by mouth every 6 (six) hours as needed for mild pain, Disp: 60 tablet, Rfl: 0  •  atorvastatin (LIPITOR) 10 mg tablet, Take 1 tablet (10 mg total) by mouth daily at bedtime, Disp: 90 tablet, Rfl: 0  •  b complex vitamins capsule, Take 1 capsule by mouth daily, Disp: , Rfl:   •  calcium gluconate 500 mg tablet, Take 1,000 mg by mouth daily, Disp: , Rfl:   •  cholecalciferol (VITAMIN D3) 1,000 units tablet, Take 1,000 Units by mouth daily, Disp: , Rfl:   •  fluticasone (FLONASE) 50 mcg/act nasal spray, 2 sprays into each nostril daily OTC, Disp: , Rfl:   •  losartan (COZAAR) 50 mg tablet, Take 1 tablet (50 mg total) by mouth daily, Disp: 90 tablet, Rfl: 1  •  methocarbamol (ROBAXIN) 500 mg tablet, Take 1 tablet (500 mg total) by mouth 3 (three) times a day as needed for muscle spasms, Disp: 90 tablet, Rfl: 3  •  Multiple Vitamin (multivitamin) tablet, Take 1 tablet by mouth daily, Disp: , Rfl:   •  naproxen (Naprosyn) 500 mg tablet, Take 1 tablet (500 mg total) by mouth 2 (two) times a day with meals, Disp: 60 tablet, Rfl: 0  •  Omega-3 Fatty Acids (fish oil) 1,000 mg, Take 1,000 mg by mouth daily, Disp: , Rfl:   •  Probiotic Product (PROBIOTIC-10 PO), Take 1 tablet by mouth in the morning, Disp: , Rfl:   •  traZODone (DESYREL) 100 mg tablet, Take 0.5 tablets (50 mg total) by mouth daily at bedtime as needed for sleep, Disp: 90 tablet, Rfl: 0  •  zinc gluconate 50 mg tablet, Take 50 mg by mouth daily, Disp: , Rfl:   •  estrogens, conjugated (Premarin) vaginal cream, Insert 1 g into the vagina 2 (two) times a week At bedtime (Patient not taking: Reported on 7/6/2023), Disp: 30 g, Rfl: 2  •  methylPREDNISolone 4 MG tablet therapy pack, Use as directed on package (Patient not taking: Reported on 7/6/2023), Disp: 21 tablet, Rfl: 0  •  montelukast (SINGULAIR) 10 mg tablet, Take 10 mg by mouth daily at bedtime (Patient not taking: Reported on 7/6/2023), Disp: , Rfl:     Current Allergies     Allergies as of 07/06/2023   • (No Known Allergies)            The following portions of the patient's history were reviewed and updated as appropriate: allergies, current medications, past family history, past medical history, past social history, past surgical history and problem list.     Past Medical History:   Diagnosis Date   • Anxiety 07/29/2022   • Cervical radiculopathy 11/01/2018   • Cervical spondylosis without myelopathy 11/01/2018   • Diverticulosis 03/14/2023   • History of colon polyps 07/29/2022   • Hypertension, essential 10/16/2019   • Internal hemorrhoids 03/14/2023   • Mixed hyperlipidemia 07/11/2018   • Non-seasonal allergic rhinitis    • Osteopenia of multiple sites 07/24/2019   • Other insomnia 07/29/2022   • Overweight    • Spinal stenosis of lumbar region with neurogenic claudication 10/22/2021   • Vitamin D deficiency 12/08/2020       Past Surgical History:   Procedure Laterality Date   • BREAST EXCISIONAL BIOPSY Left 1991   • BREAST EXCISIONAL BIOPSY Left     benign   • EGD  02/26/2018   • LAMINECTOMY AND MICRODISCECTOMY LUMBAR SPINE         Family History   Adopted: Yes   Problem Relation Age of Onset   • No Known Problems Mother         Estranged   • No Known Problems Father         Estranged   • No Known Problems Daughter    • No Known Problems Daughter    • No Known Problems Son    • Asperger's syndrome Son    • Autism spectrum disorder Son    • Bipolar disorder Son    • Anxiety disorder Son          Medications have been verified. Objective   /88   Pulse 74   Temp 97.7 °F (36.5 °C)   Resp 18   Ht 5' 5" (1.651 m)   Wt 73.9 kg (163 lb)   LMP 01/01/2015 (Within Years)   SpO2 98%   BMI 27.12 kg/m²        Physical Exam     Physical Exam  Vitals and nursing note reviewed. Constitutional:       General: She is not in acute distress. Appearance: Normal appearance. She is not ill-appearing. HENT:      Head: Normocephalic and atraumatic. Right Ear: Tympanic membrane, ear canal and external ear normal.      Left Ear: Tympanic membrane, ear canal and external ear normal.      Nose: Nose normal.      Mouth/Throat:      Mouth: Mucous membranes are moist.      Pharynx: Oropharynx is clear. Eyes:      General: No scleral icterus. Extraocular Movements: Extraocular movements intact. Conjunctiva/sclera: Conjunctivae normal.      Pupils: Pupils are equal, round, and reactive to light. Cardiovascular:      Rate and Rhythm: Normal rate and regular rhythm. Pulses: Normal pulses. Pulmonary:      Effort: Pulmonary effort is normal. No respiratory distress. Breath sounds: Normal breath sounds. Musculoskeletal:         General: Normal range of motion. Cervical back: Normal range of motion and neck supple. No tenderness. Lymphadenopathy:      Cervical: No cervical adenopathy. Skin:     General: Skin is warm. Neurological:      General: No focal deficit present. Mental Status: She is alert and oriented to person, place, and time. GCS: GCS eye subscore is 4. GCS verbal subscore is 5. GCS motor subscore is 6. Cranial Nerves: Cranial nerves 2-12 are intact. Sensory: Sensation is intact. Motor: Motor function is intact. Coordination: Coordination is intact. Romberg sign negative. Coordination normal.      Gait: Gait normal.   Psychiatric:         Mood and Affect: Mood normal.         Behavior: Behavior normal.         Thought Content:  Thought content normal.         Judgment: Judgment normal.

## 2023-07-06 NOTE — PATIENT INSTRUCTIONS
Tension Headache   WHAT YOU NEED TO KNOW:   Tension headaches are most often caused by stress, eye strain, or muscle tightness. The pain of a tension headache may start in the forehead or the back of the head. The pain often spreads over the whole head and down into the neck and shoulders. Over-the-counter pain medicine is the most useful and common treatment for a tension headache. Exercise, biofeedback, meditation, or relaxation techniques may also decrease your headache pain. DISCHARGE INSTRUCTIONS:   Call your local emergency number (911 in the [address]) if:   You have difficulty seeing, speaking, or moving. You have a seizure. Call your doctor if:   You pass out or become confused. You have a sudden headache that seems different or much worse than your usual headaches. You have a headache, fever, and a stiff neck. Your headaches continue to get worse. Your headaches happen so often that they affect your ability to do your work or normal activities. You need to take medicine to help your headaches more often than your healthcare provider says you should. Your headaches get so bad that they cause you to vomit. You have questions or concerns about your condition or care. Medicines:   NSAIDs , such as ibuprofen, help decrease swelling, pain, and fever. This medicine is available with or without a doctor's order. NSAIDs can cause stomach bleeding or kidney problems in certain people. If you take blood thinner medicine, always ask your healthcare provider if NSAIDs are safe for you. Always read the medicine label and follow directions. Acetaminophen  decreases pain and fever. It is available without a doctor's order. Ask how much to take and how often to take it. Follow directions. Read the labels of all other medicines you are using to see if they also contain acetaminophen, or ask your doctor or pharmacist. Acetaminophen can cause liver damage if not taken correctly.     Take your medicine as directed. Contact your healthcare provider if you think your medicine is not helping or if you have side effects. Tell your provider if you are allergic to any medicine. Keep a list of the medicines, vitamins, and herbs you take. Include the amounts, and when and why you take them. Bring the list or the pill bottles to follow-up visits. Carry your medicine list with you in case of an emergency. Manage your symptoms:   Keep a headache record. Include when the headaches start and stop and what made them better. Describe your symptoms, such as how the pain feels, where it is, and how bad it is. Record anything you ate or drank for the past 24 hours before your headache. Bring this to follow-up visits. Apply heat as directed. Heat may help decrease headache pain and muscle spasms. Apply heat on the area for 20 to 30 minutes every 2 hours for as many days as directed. A warm bath may also help relieve muscle tension and spasms. Apply ice as directed. Ice may help decrease headache pain. Use an ice pack or put crushed ice in a plastic bag. Cover it with a towel and place it on the area for 15 to 20 minutes every hour as directed. Ask about spinal manipulation. This treatment combines movement, massage, exercise, and physical therapy. It can help relieve a tension headache, and may be able to prevent another if done regularly. Talk to your healthcare provider to make sure it is safe for you. To prevent neck injuries, spinal manipulation should only be done by trained providers. Prevent tension headaches:   Avoid muscle tension. Do not stay in one position for long periods of time. Use a different pillow if you wake up with sore neck and shoulder muscles. Find ways to relax your muscles, such as massage or resting in a quiet, dark room. Avoid eye strain. Make sure you have good lighting when you read, sew, or do similar activities.  Get yearly eye exams and wear glasses as directed. Get enough sleep. Get 8 to 10 hours of sleep each night. Create a sleep schedule. Go to bed and wake up at the same times each day. It may be helpful to do something relaxing before bed. Do not watch television right before bed. Eat a variety of healthy foods. Healthy foods include fruits, vegetables, whole-grain breads, low-fat dairy products, beans, lean meats, and fish. Do not eat foods that trigger your headaches. Exercise regularly. Exercise helps decrease stress and headaches. Ask about the best exercise plan for you. Drink liquids as directed. You may need to drink more liquid to prevent dehydration. Dehydration can make a tension headache worse. Ask your healthcare provider how much liquid to drink and which liquids are best for you. Limit caffeine as directed. Caffeine may make a tension headache worse. Do not drink alcohol. Alcohol can trigger a headache. It can also prevent medicines from stopping your headache. Do not smoke. Nicotine and other chemicals in cigarettes and cigars can trigger a headache and also cause lung damage. Ask your healthcare provider for information if you currently smoke and need help to quit. E-cigarettes or smokeless tobacco still contain nicotine. Talk to your healthcare provider before you use these products. Follow up with your healthcare provider as directed:  Bring the headache record with you. Write down your questions so you remember to ask them during your visits. © Copyright [employer] 2022 Information is for End User's use only and may not be sold, redistributed or otherwise used for commercial purposes. The above information is an  only. It is not intended as medical advice for individual conditions or treatments. Talk to your doctor, nurse or pharmacist before following any medical regimen to see if it is safe and effective for you.

## 2023-07-07 ENCOUNTER — HOSPITAL ENCOUNTER (EMERGENCY)
Facility: HOSPITAL | Age: 63
Discharge: HOME | End: 2023-07-07
Attending: EMERGENCY MEDICINE
Payer: COMMERCIAL

## 2023-07-07 ENCOUNTER — TELEPHONE (OUTPATIENT)
Dept: NEUROLOGY | Facility: CLINIC | Age: 63
End: 2023-07-07

## 2023-07-07 ENCOUNTER — APPOINTMENT (EMERGENCY)
Dept: CT IMAGING | Facility: HOSPITAL | Age: 63
End: 2023-07-07
Payer: COMMERCIAL

## 2023-07-07 VITALS
HEART RATE: 73 BPM | SYSTOLIC BLOOD PRESSURE: 127 MMHG | RESPIRATION RATE: 17 BRPM | TEMPERATURE: 98.2 F | OXYGEN SATURATION: 96 % | DIASTOLIC BLOOD PRESSURE: 76 MMHG

## 2023-07-07 DIAGNOSIS — R51.9 HEADACHE: Primary | ICD-10-CM

## 2023-07-07 LAB
ALBUMIN SERPL BCP-MCNC: 4.1 G/DL (ref 3.5–5)
ALP SERPL-CCNC: 60 U/L (ref 34–104)
ALT SERPL W P-5'-P-CCNC: 44 U/L (ref 7–52)
ANION GAP SERPL CALCULATED.3IONS-SCNC: 7 MMOL/L
AST SERPL W P-5'-P-CCNC: 29 U/L (ref 13–39)
BASOPHILS # BLD MANUAL: 0 THOUSAND/UL (ref 0–0.1)
BASOPHILS NFR MAR MANUAL: 0 % (ref 0–1)
BILIRUB SERPL-MCNC: 0.41 MG/DL (ref 0.2–1)
BUN SERPL-MCNC: 10 MG/DL (ref 5–25)
CALCIUM SERPL-MCNC: 9.3 MG/DL (ref 8.4–10.2)
CHLORIDE SERPL-SCNC: 102 MMOL/L (ref 96–108)
CO2 SERPL-SCNC: 29 MMOL/L (ref 21–32)
CREAT SERPL-MCNC: 0.66 MG/DL (ref 0.6–1.3)
EOSINOPHIL # BLD MANUAL: 0.06 THOUSAND/UL (ref 0–0.4)
EOSINOPHIL NFR BLD MANUAL: 1 % (ref 0–6)
ERYTHROCYTE [DISTWIDTH] IN BLOOD BY AUTOMATED COUNT: 12.6 % (ref 11.6–15.1)
ERYTHROCYTE [SEDIMENTATION RATE] IN BLOOD: 12 MM/HOUR (ref 0–29)
GFR SERPL CREATININE-BSD FRML MDRD: 94 ML/MIN/1.73SQ M
GLUCOSE SERPL-MCNC: 105 MG/DL (ref 65–140)
HCT VFR BLD AUTO: 38.5 % (ref 34.8–46.1)
HGB BLD-MCNC: 13 G/DL (ref 11.5–15.4)
LYMPHOCYTES # BLD AUTO: 1.01 THOUSAND/UL (ref 0.6–4.47)
LYMPHOCYTES # BLD AUTO: 16 % (ref 14–44)
MCH RBC QN AUTO: 30.6 PG (ref 26.8–34.3)
MCHC RBC AUTO-ENTMCNC: 33.8 G/DL (ref 31.4–37.4)
MCV RBC AUTO: 91 FL (ref 82–98)
MONOCYTES # BLD AUTO: 0.63 THOUSAND/UL (ref 0–1.22)
MONOCYTES NFR BLD: 10 % (ref 4–12)
NEUTROPHILS # BLD MANUAL: 4.41 THOUSAND/UL (ref 1.85–7.62)
NEUTS SEG NFR BLD AUTO: 70 % (ref 43–75)
PLATELET # BLD AUTO: 189 THOUSANDS/UL (ref 149–390)
PLATELET BLD QL SMEAR: ADEQUATE
PMV BLD AUTO: 9.5 FL (ref 8.9–12.7)
POTASSIUM SERPL-SCNC: 3.8 MMOL/L (ref 3.5–5.3)
PROT SERPL-MCNC: 7.1 G/DL (ref 6.4–8.4)
RBC # BLD AUTO: 4.25 MILLION/UL (ref 3.81–5.12)
RBC MORPH BLD: NORMAL
SODIUM SERPL-SCNC: 138 MMOL/L (ref 135–147)
VARIANT LYMPHS # BLD AUTO: 3 %
WBC # BLD AUTO: 6.3 THOUSAND/UL (ref 4.31–10.16)

## 2023-07-07 PROCEDURE — 70498 CT ANGIOGRAPHY NECK: CPT

## 2023-07-07 PROCEDURE — 80053 COMPREHEN METABOLIC PANEL: CPT | Performed by: EMERGENCY MEDICINE

## 2023-07-07 PROCEDURE — 36415 COLL VENOUS BLD VENIPUNCTURE: CPT | Performed by: EMERGENCY MEDICINE

## 2023-07-07 PROCEDURE — 85652 RBC SED RATE AUTOMATED: CPT | Performed by: EMERGENCY MEDICINE

## 2023-07-07 PROCEDURE — 85027 COMPLETE CBC AUTOMATED: CPT | Performed by: EMERGENCY MEDICINE

## 2023-07-07 PROCEDURE — 70496 CT ANGIOGRAPHY HEAD: CPT

## 2023-07-07 PROCEDURE — 85007 BL SMEAR W/DIFF WBC COUNT: CPT | Performed by: EMERGENCY MEDICINE

## 2023-07-07 RX ORDER — BUTALBITAL, ACETAMINOPHEN AND CAFFEINE 300; 40; 50 MG/1; MG/1; MG/1
50 CAPSULE ORAL EVERY 6 HOURS PRN
Qty: 30 CAPSULE | Refills: 0 | Status: SHIPPED | OUTPATIENT
Start: 2023-07-07

## 2023-07-07 RX ORDER — BUTALBITAL, ACETAMINOPHEN AND CAFFEINE 300; 40; 50 MG/1; MG/1; MG/1
50 CAPSULE ORAL EVERY 6 HOURS PRN
Qty: 12 CAPSULE | Refills: 0 | Status: SHIPPED | OUTPATIENT
Start: 2023-07-07 | End: 2023-07-07 | Stop reason: CLARIF

## 2023-07-07 RX ORDER — BUTALBITAL, ACETAMINOPHEN AND CAFFEINE 300; 40; 50 MG/1; MG/1; MG/1
50 CAPSULE ORAL EVERY 6 HOURS PRN
Qty: 30 CAPSULE | Refills: 0 | Status: SHIPPED | OUTPATIENT
Start: 2023-07-07 | End: 2023-07-17

## 2023-07-07 RX ORDER — IODIXANOL 320 MG/ML
100 INJECTION, SOLUTION INTRAVASCULAR
Status: COMPLETED | OUTPATIENT
Start: 2023-07-07 | End: 2023-07-07

## 2023-07-07 RX ORDER — KETOROLAC TROMETHAMINE 30 MG/ML
15 INJECTION, SOLUTION INTRAMUSCULAR; INTRAVENOUS ONCE
Status: COMPLETED | OUTPATIENT
Start: 2023-07-07 | End: 2023-07-07

## 2023-07-07 RX ORDER — BUTALBITAL, ACETAMINOPHEN AND CAFFEINE 50; 325; 40 MG/1; MG/1; MG/1
1 TABLET ORAL ONCE
Status: COMPLETED | OUTPATIENT
Start: 2023-07-07 | End: 2023-07-07

## 2023-07-07 RX ORDER — METOCLOPRAMIDE HYDROCHLORIDE 5 MG/ML
10 INJECTION INTRAMUSCULAR; INTRAVENOUS ONCE
Status: COMPLETED | OUTPATIENT
Start: 2023-07-07 | End: 2023-07-07

## 2023-07-07 RX ORDER — DEXAMETHASONE SODIUM PHOSPHATE 10 MG/ML
10 INJECTION, SOLUTION INTRAMUSCULAR; INTRAVENOUS ONCE
Status: COMPLETED | OUTPATIENT
Start: 2023-07-07 | End: 2023-07-07

## 2023-07-07 RX ORDER — DIPHENHYDRAMINE HYDROCHLORIDE 50 MG/ML
25 INJECTION INTRAMUSCULAR; INTRAVENOUS ONCE
Status: COMPLETED | OUTPATIENT
Start: 2023-07-07 | End: 2023-07-07

## 2023-07-07 RX ADMIN — BUTALBITAL, ACETAMINOPHEN, AND CAFFEINE 1 TABLET: 325; 50; 40 TABLET ORAL at 09:25

## 2023-07-07 RX ADMIN — DIPHENHYDRAMINE HYDROCHLORIDE 25 MG: 50 INJECTION, SOLUTION INTRAMUSCULAR; INTRAVENOUS at 07:49

## 2023-07-07 RX ADMIN — DEXAMETHASONE SODIUM PHOSPHATE 10 MG: 10 INJECTION, SOLUTION INTRAMUSCULAR; INTRAVENOUS at 09:26

## 2023-07-07 RX ADMIN — SODIUM CHLORIDE 1000 ML: 0.9 INJECTION, SOLUTION INTRAVENOUS at 07:54

## 2023-07-07 RX ADMIN — IODIXANOL 100 ML: 320 INJECTION, SOLUTION INTRAVASCULAR at 08:39

## 2023-07-07 RX ADMIN — KETOROLAC TROMETHAMINE 15 MG: 30 INJECTION, SOLUTION INTRAMUSCULAR at 07:47

## 2023-07-07 RX ADMIN — METOCLOPRAMIDE 10 MG: 5 INJECTION, SOLUTION INTRAMUSCULAR; INTRAVENOUS at 07:51

## 2023-07-07 NOTE — ED PROVIDER NOTES
History  Chief Complaint   Patient presents with   • Headache     Pt presents with c/o HA since Monday morning and she thought he was dehydrated, drove home from [place] Monday morning. She has been taking OTC medications and went to urgent care and was given Naproxen which did not help much. She describes it as a pounding, worsening when she sits up or bends. Denies any injuries to the head, or vomiting. States that she has migraines years ago but has not had them in a while. She has a history of a cyst on the brain since . HPI  76-year-old female presents with headache that started 4 days ago. She was drinking wine before it started, thought that she was dehydrated. States it is throughout her whole head, worse in the back. Has history of brain cyst and migraines. No fevers, neck pain or stiffness. Prior to Admission Medications   Prescriptions Last Dose Informant Patient Reported? Taking?    Multiple Vitamin (multivitamin) tablet  Self Yes No   Sig: Take 1 tablet by mouth daily   Omega-3 Fatty Acids (fish oil) 1,000 mg  Self Yes No   Sig: Take 1,000 mg by mouth daily   Probiotic Product (PROBIOTIC-10 PO)  Self Yes No   Sig: Take 1 tablet by mouth in the morning   acetaminophen (TYLENOL) 500 mg tablet   No No   Sig: Take 1 tablet (500 mg total) by mouth every 6 (six) hours as needed for mild pain   atorvastatin (LIPITOR) 10 mg tablet  Self No No   Sig: Take 1 tablet (10 mg total) by mouth daily at bedtime   b complex vitamins capsule  Self Yes No   Sig: Take 1 capsule by mouth daily   calcium gluconate 500 mg tablet  Self Yes No   Sig: Take 1,000 mg by mouth daily   cholecalciferol (VITAMIN D3) 1,000 units tablet  Self Yes No   Sig: Take 1,000 Units by mouth daily   estrogens, conjugated (Premarin) vaginal cream  Self No No   Sig: Insert 1 g into the vagina 2 (two) times a week At bedtime   Patient not taking: Reported on 2023   fluticasone (FLONASE) 50 mcg/act nasal spray  Self Yes No   Si sprays into each nostril daily OTC   losartan (COZAAR) 50 mg tablet  Self No No   Sig: Take 1 tablet (50 mg total) by mouth daily   methocarbamol (ROBAXIN) 500 mg tablet   No No   Sig: Take 1 tablet (500 mg total) by mouth 3 (three) times a day as needed for muscle spasms   methylPREDNISolone 4 MG tablet therapy pack   No No   Sig: Use as directed on package   Patient not taking: Reported on 7/6/2023   montelukast (SINGULAIR) 10 mg tablet  Self Yes No   Sig: Take 10 mg by mouth daily at bedtime   Patient not taking: Reported on 7/6/2023   naproxen (Naprosyn) 500 mg tablet   No No   Sig: Take 1 tablet (500 mg total) by mouth 2 (two) times a day with meals   traZODone (DESYREL) 100 mg tablet  Self No No   Sig: Take 0.5 tablets (50 mg total) by mouth daily at bedtime as needed for sleep   zinc gluconate 50 mg tablet  Self Yes No   Sig: Take 50 mg by mouth daily      Facility-Administered Medications: None       Past Medical History:   Diagnosis Date   • Anxiety 07/29/2022   • Cervical radiculopathy 11/01/2018   • Cervical spondylosis without myelopathy 11/01/2018   • Diverticulosis 03/14/2023   • History of colon polyps 07/29/2022   • Hypertension, essential 10/16/2019   • Internal hemorrhoids 03/14/2023   • Mixed hyperlipidemia 07/11/2018   • Non-seasonal allergic rhinitis    • Osteopenia of multiple sites 07/24/2019   • Other insomnia 07/29/2022   • Overweight    • Spinal stenosis of lumbar region with neurogenic claudication 10/22/2021   • Vitamin D deficiency 12/08/2020       Past Surgical History:   Procedure Laterality Date   • BREAST EXCISIONAL BIOPSY Left 1991   • BREAST EXCISIONAL BIOPSY Left     benign   • EGD  02/26/2018   • LAMINECTOMY AND MICRODISCECTOMY LUMBAR SPINE         Family History   Adopted: Yes   Problem Relation Age of Onset   • No Known Problems Mother         Estranged   • No Known Problems Father         Estranged   • No Known Problems Daughter    • No Known Problems Daughter    • No Known Problems Son    • Asperger's syndrome Son    • Autism spectrum disorder Son    • Bipolar disorder Son    • Anxiety disorder Son      I have reviewed and agree with the history as documented. E-Cigarette/Vaping   • E-Cigarette Use Never User      E-Cigarette/Vaping Substances   • Nicotine No    • THC No    • CBD No    • Flavoring No    • Other No    • Unknown No      Social History     Tobacco Use   • Smoking status: Never   • Smokeless tobacco: Never   Vaping Use   • Vaping Use: Never used   Substance Use Topics   • Alcohol use: Yes     Alcohol/week: 8.0 standard drinks of alcohol     Types: 8 Glasses of wine per week   • Drug use: Never       Review of Systems   Constitutional: Negative for chills and fever. HENT: Negative for dental problem and ear pain. Eyes: Positive for photophobia. Negative for pain and redness. Respiratory: Negative for cough and shortness of breath. Cardiovascular: Negative for chest pain and palpitations. Gastrointestinal: Negative for abdominal pain and nausea. Endocrine: Negative for polydipsia and polyphagia. Genitourinary: Negative for dysuria and frequency. Musculoskeletal: Negative for arthralgias and joint swelling. Skin: Negative for color change and rash. Neurological: Positive for headaches. Negative for dizziness. Psychiatric/Behavioral: Negative for behavioral problems and confusion. All other systems reviewed and are negative. Physical Exam  Physical Exam  Vitals and nursing note reviewed. Constitutional:       General: She is not in acute distress. Appearance: She is well-developed. She is not diaphoretic. HENT:      Head: Atraumatic. Right Ear: External ear normal.      Left Ear: External ear normal.      Nose: Nose normal.   Eyes:      Conjunctiva/sclera: Conjunctivae normal.      Pupils: Pupils are equal, round, and reactive to light. Neck:      Vascular: No JVD.    Cardiovascular:      Rate and Rhythm: Normal rate and regular rhythm. Heart sounds: Normal heart sounds. No murmur heard. Pulmonary:      Effort: Pulmonary effort is normal. No respiratory distress. Breath sounds: Normal breath sounds. No wheezing. Abdominal:      General: Bowel sounds are normal. There is no distension. Palpations: Abdomen is soft. Tenderness: There is no abdominal tenderness. Musculoskeletal:         General: Normal range of motion. Cervical back: Normal range of motion and neck supple. Skin:     General: Skin is warm and dry. Capillary Refill: Capillary refill takes less than 2 seconds. Neurological:      General: No focal deficit present. Mental Status: She is alert and oriented to person, place, and time. Cranial Nerves: No cranial nerve deficit. Sensory: No sensory deficit. Motor: No weakness.       Coordination: Coordination normal.      Gait: Gait normal.   Psychiatric:         Behavior: Behavior normal.         Vital Signs  ED Triage Vitals   Temperature Pulse Respirations Blood Pressure SpO2   07/07/23 0713 07/07/23 0713 07/07/23 0713 07/07/23 0713 07/07/23 0713   98.2 °F (36.8 °C) 86 20 152/85 99 %      Temp Source Heart Rate Source Patient Position - Orthostatic VS BP Location FiO2 (%)   07/07/23 0713 07/07/23 0713 07/07/23 0713 07/07/23 0713 --   Temporal Monitor Sitting Right arm       Pain Score       07/07/23 0747       8           Vitals:    07/07/23 0900 07/07/23 1030 07/07/23 1100 07/07/23 1130   BP: 125/73 123/76 106/66 127/76   Pulse: 80 73 75 73   Patient Position - Orthostatic VS:             Visual Acuity  Visual Acuity    Flowsheet Row Most Recent Value   L Pupil Size (mm) 3   R Pupil Size (mm) 3          ED Medications  Medications   ketorolac (TORADOL) injection 15 mg (15 mg Intravenous Given 7/7/23 0767)   sodium chloride 0.9 % bolus 1,000 mL (0 mL Intravenous Stopped 7/7/23 1109)   metoclopramide (REGLAN) injection 10 mg (10 mg Intravenous Given 7/7/23 0751) diphenhydrAMINE (BENADRYL) injection 25 mg (25 mg Intravenous Given 7/7/23 0749)   iodixanol (VISIPAQUE) 320 MG/ML injection 100 mL (100 mL Intravenous Given 7/7/23 0839)   dexamethasone (PF) (DECADRON) injection 10 mg (10 mg Intravenous Given 7/7/23 0926)   butalbital-acetaminophen-caffeine (FIORICET,ESGIC) -40 mg per tablet 1 tablet (1 tablet Oral Given 7/7/23 0925)       Diagnostic Studies  Results Reviewed     Procedure Component Value Units Date/Time    Manual Differential(PHLEBS Do Not Order) [[id number]]  (Abnormal) Collected: 07/07/23 0745    Lab Status: Final result Specimen: Blood from Arm, Left Updated: 07/07/23 0859     Segmented % 70 %      Lymphocytes % 16 %      Monocytes % 10 %      Eosinophils, % 1 %      Basophils % 0 %      Atypical Lymphocytes % 3 %      Absolute Neutrophils 4.41 Thousand/uL      Lymphocytes Absolute 1.01 Thousand/uL      Monocytes Absolute 0.63 Thousand/uL      Eosinophils Absolute 0.06 Thousand/uL      Basophils Absolute 0.00 Thousand/uL      Total Counted --     RBC Morphology Normal     Platelet Estimate Adequate    Comprehensive metabolic panel [[id number]] Collected: 07/07/23 0745    Lab Status: Final result Specimen: Blood from Arm, Left Updated: 07/07/23 0825     Sodium 138 mmol/L      Potassium 3.8 mmol/L      Chloride 102 mmol/L      CO2 29 mmol/L      ANION GAP 7 mmol/L      BUN 10 mg/dL      Creatinine 0.66 mg/dL      Glucose 105 mg/dL      Calcium 9.3 mg/dL      AST 29 U/L      ALT 44 U/L      Alkaline Phosphatase 60 U/L      Total Protein 7.1 g/dL      Albumin 4.1 g/dL      Total Bilirubin 0.41 mg/dL      eGFR 94 ml/min/1.73sq m     Narrative:      [place] guidelines for Chronic Kidney Disease (CKD):   •  Stage 1 with normal or high GFR (GFR > 90 mL/min/1.73 square meters)  •  Stage 2 Mild CKD (GFR = 60-89 mL/min/1.73 square meters)  •  Stage 3A Moderate CKD (GFR = 45-59 mL/min/1.73 square meters)  •  Stage 3B Moderate CKD (GFR = 30-44 mL/min/1.73 square meters)  •  Stage 4 Severe CKD (GFR = 15-29 mL/min/1.73 square meters)  •  Stage 5 End Stage CKD (GFR <15 mL/min/1.73 square meters)  Note: GFR calculation is accurate only with a steady state creatinine    CBC and differential [[id number]]  (Normal) Collected: 07/07/23 0745    Lab Status: Final result Specimen: Blood from Arm, Left Updated: 07/07/23 0804     WBC 6.30 Thousand/uL      RBC 4.25 Million/uL      Hemoglobin 13.0 g/dL      Hematocrit 38.5 %      MCV 91 fL      MCH 30.6 pg      MCHC 33.8 g/dL      RDW 12.6 %      MPV 9.5 fL      Platelets 311 Thousands/uL     Sedimentation rate, automated [[id number]]  (Normal) Collected: 07/07/23 0745    Lab Status: Final result Specimen: Blood from Arm, Left Updated: 07/07/23 0803     Sed Rate 12 mm/hour                  CTA head and neck with and without contrast   Final Result by Jazzy Osuna DO (07/07 1114)      No acute intracranial abnormality. Stable size cystic lesion left parietal lobe. No significant carotid or focal vertebral artery stenosis. 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 2 mm outpouching from this vestigial anastomosis represents an aneurysm. Recommend consultation with the [facility], a    division of [facility] at [phone]. .            Workstation performed: [id number]                    Procedures  Procedures         ED Course                               SBIRT 22yo+    Flowsheet Row Most Recent Value   Initial Alcohol Screen: US AUDIT-C     1. How often do you have a drink containing alcohol? 0 Filed at: 07/07/2023 0807   2. How many drinks containing alcohol do you have on a typical day you are drinking? 0 Filed at: 07/07/2023 0807   3b. FEMALE Any Age, or MALE 65+: How often do you have 4 or more drinks on one occassion?  0 Filed at: 07/07/2023 0807   Audit-C Score 0 Filed at: 07/07/2023 0807   IQRA: How many times in the past year have you. .. Used an illegal drug or used a prescription medication for non-medical reasons? Never Filed at: 07/07/2023 0807                    [facility]  Normal neuro exam. Headache is not maximal in onset or worst of life. No neck pain/stiffness, no fevers, doubt SAH or meningitis. No elevation of ESR, doubt temporal arteritis. CTA head and neck shows aneurysm, I spoke with Dr. Ciara West from neuro interventional who is reviewed the case, no indication for transfer at this time, recommends outpatient follow-up. Feels improved with treatment, will refer to neurosurgery and neurology. Disposition  Final diagnoses:   Headache     Time reflects when diagnosis was documented in both MDM as applicable and the Disposition within this note     Time User Action Codes Description Comment    7/7/2023 11:59 AM Rani Lagos Add [R51.9] Headache       ED Disposition     ED Disposition   Discharge    Condition   Stable    Date/Time   Fri Jul 7, 2023 11:59 AM    Comment   Karla Bold discharge to home/self care.                Follow-up Information     Follow up With Specialties Details Why Contact Info    Nicola Tsang MD Neurosurgery Schedule an appointment as soon as possible for a visit  for neurosurgery [address] [address]  [phone]            Discharge Medication List as of 7/7/2023 12:03 PM      START taking these medications    Details   Butalbital-APAP-Caffeine (Fioricet) -40 MG CAPS Take 50 mg by mouth every 6 (six) hours as needed (headache) for up to 10 days, Starting Fri 7/7/2023, Until Mon 7/17/2023 at 2359, Normal         CONTINUE these medications which have NOT CHANGED    Details   acetaminophen (TYLENOL) 500 mg tablet Take 1 tablet (500 mg total) by mouth every 6 (six) hours as needed for mild pain, Starting Thu 7/6/2023, Normal      atorvastatin (LIPITOR) 10 mg tablet Take 1 tablet (10 mg total) by mouth daily at bedtime, Starting Tue 9/13/2022, Normal      b complex vitamins capsule Take 1 capsule by mouth daily, Historical Med      calcium gluconate 500 mg tablet Take 1,000 mg by mouth daily, Historical Med      cholecalciferol (VITAMIN D3) 1,000 units tablet Take 1,000 Units by mouth daily, Historical Med      estrogens, conjugated (Premarin) vaginal cream Insert 1 g into the vagina 2 (two) times a week At bedtime, Starting Thu 11/3/2022, Normal      fluticasone (FLONASE) 50 mcg/act nasal spray 2 sprays into each nostril daily OTC, Historical Med      losartan (COZAAR) 50 mg tablet Take 1 tablet (50 mg total) by mouth daily, Starting Tue 9/13/2022, Until Wed 9/13/2023, Normal      methocarbamol (ROBAXIN) 500 mg tablet Take 1 tablet (500 mg total) by mouth 3 (three) times a day as needed for muscle spasms, Starting Mon 4/24/2023, Normal      methylPREDNISolone 4 MG tablet therapy pack Use as directed on package, Normal      montelukast (SINGULAIR) 10 mg tablet Take 10 mg by mouth daily at bedtime, Historical Med      Multiple Vitamin (multivitamin) tablet Take 1 tablet by mouth daily, Historical Med      naproxen (Naprosyn) 500 mg tablet Take 1 tablet (500 mg total) by mouth 2 (two) times a day with meals, Starting Thu 7/6/2023, Normal      Omega-3 Fatty Acids (fish oil) 1,000 mg Take 1,000 mg by mouth daily, Historical Med      Probiotic Product (PROBIOTIC-10 PO) Take 1 tablet by mouth in the morning, Historical Med      traZODone (DESYREL) 100 mg tablet Take 0.5 tablets (50 mg total) by mouth daily at bedtime as needed for sleep, Starting Tue 9/13/2022, Normal      zinc gluconate 50 mg tablet Take 50 mg by mouth daily, Historical Med                 PDMP Review       Value Time User    PDMP Reviewed  Yes 8/3/2022  3:20 PM Jennifer Martell DO          ED Provider  Electronically Signed by           Chely Reza MD  07/07/23 7352

## 2023-07-07 NOTE — TELEPHONE ENCOUNTER
Patient calling to schedule new patient appointment for headaches. Some testing done. Patient seen in ER. Triage intake sent. Initiate Treatment: Triamcinolone 0.1% cream bid x 2-4 weeks, Antihistamine QD, and Dove or Cetaphil soap Detail Level: Zone

## 2023-07-07 NOTE — DISCHARGE INSTRUCTIONS
Take Fioricet as needed for headache, follow-up with neurology in addition to neurosurgery.   Return to ED for worsening headache, confusion, visual changes or neck pain

## 2023-07-11 ENCOUNTER — APPOINTMENT (OUTPATIENT)
Age: 63
End: 2023-07-11
Payer: COMMERCIAL

## 2023-07-11 ENCOUNTER — OFFICE VISIT (OUTPATIENT)
Age: 63
End: 2023-07-11
Payer: COMMERCIAL

## 2023-07-11 VITALS
SYSTOLIC BLOOD PRESSURE: 118 MMHG | RESPIRATION RATE: 18 BRPM | HEIGHT: 66 IN | WEIGHT: 159 LBS | BODY MASS INDEX: 25.55 KG/M2 | OXYGEN SATURATION: 98 % | HEART RATE: 80 BPM | DIASTOLIC BLOOD PRESSURE: 70 MMHG

## 2023-07-11 DIAGNOSIS — I67.1 CEREBRAL ANEURYSM: ICD-10-CM

## 2023-07-11 DIAGNOSIS — I10 HYPERTENSION, ESSENTIAL: ICD-10-CM

## 2023-07-11 DIAGNOSIS — I72.9 ANEURYSM (HCC): ICD-10-CM

## 2023-07-11 DIAGNOSIS — R51.9 ACUTE NONINTRACTABLE HEADACHE, UNSPECIFIED HEADACHE TYPE: Primary | ICD-10-CM

## 2023-07-11 DIAGNOSIS — M54.12 CERVICAL RADICULOPATHY: ICD-10-CM

## 2023-07-11 DIAGNOSIS — E78.2 MIXED HYPERLIPIDEMIA: ICD-10-CM

## 2023-07-11 DIAGNOSIS — G47.09 OTHER INSOMNIA: ICD-10-CM

## 2023-07-11 DIAGNOSIS — G93.9 BRAIN LESION: ICD-10-CM

## 2023-07-11 PROCEDURE — 36415 COLL VENOUS BLD VENIPUNCTURE: CPT

## 2023-07-11 PROCEDURE — 99204 OFFICE O/P NEW MOD 45 MIN: CPT | Performed by: INTERNAL MEDICINE

## 2023-07-11 PROCEDURE — 86618 LYME DISEASE ANTIBODY: CPT

## 2023-07-11 PROCEDURE — 87468 ANAPLSMA PHGCYTOPHLM AMP PRB: CPT

## 2023-07-11 PROCEDURE — 86617 LYME DISEASE ANTIBODY: CPT

## 2023-07-11 RX ORDER — ATORVASTATIN CALCIUM 10 MG/1
10 TABLET, FILM COATED ORAL
Qty: 90 TABLET | Refills: 0 | Status: CANCELLED | OUTPATIENT
Start: 2023-07-11

## 2023-07-11 RX ORDER — INDOMETHACIN 75 MG/1
75 CAPSULE, EXTENDED RELEASE ORAL 2 TIMES DAILY WITH MEALS
Qty: 30 CAPSULE | Refills: 0 | Status: SHIPPED | OUTPATIENT
Start: 2023-07-11

## 2023-07-11 RX ORDER — TRAZODONE HYDROCHLORIDE 100 MG/1
50 TABLET ORAL
Qty: 90 TABLET | Refills: 0 | Status: CANCELLED | OUTPATIENT
Start: 2023-07-11

## 2023-07-11 RX ORDER — LOSARTAN POTASSIUM 50 MG/1
50 TABLET ORAL DAILY
Qty: 90 TABLET | Refills: 1 | Status: CANCELLED | OUTPATIENT
Start: 2023-07-11 | End: 2024-07-10

## 2023-07-11 NOTE — PATIENT INSTRUCTIONS
Headache: Prescribed indomethacin 75 mg twice daily, recommended stopping Advil in the meantime. Anaplasma and Lyme disease work up pending. Follow-up with neurology. Hypertension: Patient's blood pressure is well controlled, she can continue her current regimen. Avoid Heavy lifting and can continue swimming. Incidental brain aneurysm: Given her aneurysm size recommended patient to follow-up with neurosurgery in a month according to her appointment.

## 2023-07-11 NOTE — PROGRESS NOTES
Assessment/Plan:       Diagnoses and all orders for this visit:    Acute nonintractable headache, unspecified headache type    Other insomnia    Hypertension, essential    Mixed hyperlipidemia    Aneurysm ([address])          Patient Instructions   Headache:     Hypertension: Patient's blood pressure is well controlled, she can continue her current regimen. Incidental brain aneurysm: Recommended patient to follow-up with neurosurgery in a month according to her appointment. Subjective:      Patient ID: Maria Luz Atwood is a 60 y.o. female. Headache: Patient has been having a headache for 1 week. Bounding headache with Phono and Photophobia mainly right sided and around her right eye. She didn't experience any nausea or vomiting. No abnormal eye tearing. She was seen in the ED on 7-7 and a brain CTA found her brain cyst to be unchanged. Her headache has improved today but not completely resolved. She states Advil improves her headache. Anaplasma and Lyme work up are pending,     Hypertension: Her blood pressure has been well controlled. Incidental brain aneurysm: Patient was found to have 2 mm aneurysm on 7-7. Following up with the neurosurgery in a month. The following portions of the patient's history were reviewed and updated as appropriate:   She has a past medical history of Anxiety (07/29/2022), Cervical radiculopathy (11/01/2018), Cervical spondylosis without myelopathy (11/01/2018), Chronic headaches, Diverticulosis (03/14/2023), History of colon polyps (07/29/2022), Hypertension, essential (10/16/2019), Internal hemorrhoids (03/14/2023), Mixed hyperlipidemia (07/11/2018), Non-seasonal allergic rhinitis, Osteopenia of multiple sites (07/24/2019), Other insomnia (07/29/2022), Overweight, Spinal stenosis of lumbar region with neurogenic claudication (10/22/2021), and Vitamin D deficiency (12/08/2020). ,  does not have any pertinent problems on file. ,   has a past surgical history that includes EGD (02/26/2018); Laminectomy and microdiscectomy lumbar spine; Breast excisional biopsy (Left, 1991); and Breast excisional biopsy (Left). ,  family history includes Anxiety disorder in her son; Asperger's syndrome in her son; Autism spectrum disorder in her son; Bipolar disorder in her son; No Known Problems in her daughter, daughter, father, mother, and son. She was adopted. ,   reports that she has never smoked. She has never used smokeless tobacco. She reports current alcohol use of about 8.0 standard drinks of alcohol per week. She reports that she does not use drugs. ,  has No Known Allergies. .  Current Outpatient Medications   Medication Sig Dispense Refill   • acetaminophen (TYLENOL) 500 mg tablet Take 1 tablet (500 mg total) by mouth every 6 (six) hours as needed for mild pain 60 tablet 0   • atorvastatin (LIPITOR) 10 mg tablet Take 1 tablet (10 mg total) by mouth daily at bedtime 90 tablet 0   • b complex vitamins capsule Take 1 capsule by mouth daily     • Butalbital-APAP-Caffeine (Fioricet) -40 MG CAPS Take 50 mg by mouth every 6 (six) hours as needed (headache) for up to 10 days 30 capsule 0   • calcium gluconate 500 mg tablet Take 1,000 mg by mouth daily     • cholecalciferol (VITAMIN D3) 1,000 units tablet Take 1,000 Units by mouth daily     • fluticasone (FLONASE) 50 mcg/act nasal spray 2 sprays into each nostril daily OTC     • losartan (COZAAR) 50 mg tablet Take 1 tablet (50 mg total) by mouth daily 90 tablet 1   • methocarbamol (ROBAXIN) 500 mg tablet Take 1 tablet (500 mg total) by mouth 3 (three) times a day as needed for muscle spasms 90 tablet 3   • Multiple Vitamin (multivitamin) tablet Take 1 tablet by mouth daily     • naproxen (Naprosyn) 500 mg tablet Take 1 tablet (500 mg total) by mouth 2 (two) times a day with meals 60 tablet 0   • Omega-3 Fatty Acids (fish oil) 1,000 mg Take 1,000 mg by mouth daily     • Probiotic Product (PROBIOTIC-10 PO) Take 1 tablet by mouth in the morning     • traZODone (DESYREL) 100 mg tablet Take 0.5 tablets (50 mg total) by mouth daily at bedtime as needed for sleep 90 tablet 0   • zinc gluconate 50 mg tablet Take 50 mg by mouth daily     • Butalbital-APAP-Caffeine (Fioricet) -40 MG CAPS Take 50 mg by mouth every 6 (six) hours as needed (headache) (Patient not taking: Reported on 7/11/2023) 30 capsule 0   • estrogens, conjugated (Premarin) vaginal cream Insert 1 g into the vagina 2 (two) times a week At bedtime (Patient not taking: Reported on 7/6/2023) 30 g 2   • methylPREDNISolone 4 MG tablet therapy pack Use as directed on package (Patient not taking: Reported on 7/6/2023) 21 tablet 0   • montelukast (SINGULAIR) 10 mg tablet Take 10 mg by mouth daily at bedtime (Patient not taking: Reported on 7/6/2023)       No current facility-administered medications for this visit. Review of Systems   Constitutional: Negative for chills and fever. HENT: Negative for ear pain and sore throat. Eyes: Negative for pain and visual disturbance. Respiratory: Negative for cough and shortness of breath. Cardiovascular: Negative for chest pain and palpitations. Gastrointestinal: Negative for abdominal pain and vomiting. Genitourinary: Negative for dysuria and hematuria. Musculoskeletal: Negative for arthralgias and back pain. Skin: Negative for color change and rash. Neurological: Positive for headaches. Negative for seizures and syncope. All other systems reviewed and are negative. Objective:  Vitals:    07/11/23 1612   BP: 126/86   BP Location: Left arm   Patient Position: Sitting   Cuff Size: Standard   Pulse: 80   Resp: 18   SpO2: 98%   Weight: 72.1 kg (159 lb)   Height: 5' 5.5" (1.664 m)     Body mass index is 26.06 kg/m². Physical Exam  Constitutional:       Appearance: Normal appearance. Eyes:      Extraocular Movements: Extraocular movements intact. Cardiovascular:      Pulses: Normal pulses.       Heart sounds: Normal heart sounds. Pulmonary:      Effort: Pulmonary effort is normal.      Breath sounds: Normal breath sounds. Neurological:      General: No focal deficit present. Mental Status: She is alert. Motor: No weakness. Deep Tendon Reflexes: Reflexes are normal and symmetric.

## 2023-07-12 ENCOUNTER — E-CONSULT (OUTPATIENT)
Dept: NEUROSURGERY | Facility: CLINIC | Age: 63
End: 2023-07-12

## 2023-07-12 DIAGNOSIS — I67.1 CEREBRAL ANEURYSM, NONRUPTURED: Primary | ICD-10-CM

## 2023-07-12 DIAGNOSIS — G93.0 BRAIN CYST: ICD-10-CM

## 2023-07-12 LAB
B BURGDOR IGG SERPL QL IA: POSITIVE
B BURGDOR IGG+IGM SER QL IA: POSITIVE
B BURGDOR IGM SERPL QL IA: POSITIVE

## 2023-07-12 NOTE — PROGRESS NOTES
E-Consult  Isabel Davenport 60 y.o. female MRN: [id number]  Encounter Date: 07/12/23      Reason for Consult / Principal Problem: PTA with possible infindibulm vs aneurysm and Left parietal cyst    Consulting Provider: Zainab Franco MD    Requesting Provider: Dinora Rose MD       ASSESSMENT:  63yoF presenting with headaches. 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 Left parietal cyst.   This appears unchanged on CT compared to MRI from 12/2022. She should have a repeat MRI to ensure that there is no enhancement or new abnormal signal.  However this is likely to be congenital and noncontributory. 2.  Persistent trigeminal artery  This is an anatomical variant, even if small aneurysm is present in the cavernous segment, it is not the cause of her symptoms nor does it require treatment. Total time spent 11-20 minutes, >50% of the total time devoted to medical consultative verbal/EMR discussion between providers. Written report will be generated in the EMR. Dudley Willingham

## 2023-07-13 ENCOUNTER — TELEPHONE (OUTPATIENT)
Age: 63
End: 2023-07-13

## 2023-07-13 ENCOUNTER — PROCEDURE VISIT (OUTPATIENT)
Age: 63
End: 2023-07-13
Payer: COMMERCIAL

## 2023-07-13 VITALS
SYSTOLIC BLOOD PRESSURE: 94 MMHG | BODY MASS INDEX: 26.06 KG/M2 | HEART RATE: 74 BPM | HEIGHT: 66 IN | DIASTOLIC BLOOD PRESSURE: 69 MMHG

## 2023-07-13 DIAGNOSIS — G47.09 OTHER INSOMNIA: ICD-10-CM

## 2023-07-13 DIAGNOSIS — M54.12 CERVICAL RADICULOPATHY: ICD-10-CM

## 2023-07-13 DIAGNOSIS — G89.29 CHRONIC BILATERAL LOW BACK PAIN WITH LEFT-SIDED SCIATICA: ICD-10-CM

## 2023-07-13 DIAGNOSIS — M79.18 MYOFASCIAL PAIN: ICD-10-CM

## 2023-07-13 DIAGNOSIS — M54.42 CHRONIC BILATERAL LOW BACK PAIN WITH LEFT-SIDED SCIATICA: ICD-10-CM

## 2023-07-13 DIAGNOSIS — A69.20 LYME BORRELIOSIS: Primary | ICD-10-CM

## 2023-07-13 DIAGNOSIS — E78.2 MIXED HYPERLIPIDEMIA: ICD-10-CM

## 2023-07-13 DIAGNOSIS — I10 HYPERTENSION, ESSENTIAL: ICD-10-CM

## 2023-07-13 DIAGNOSIS — M54.2 NECK PAIN: Primary | ICD-10-CM

## 2023-07-13 PROCEDURE — 97140 MANUAL THERAPY 1/> REGIONS: CPT | Performed by: CHIROPRACTOR

## 2023-07-13 PROCEDURE — 98940 CHIROPRACT MANJ 1-2 REGIONS: CPT | Performed by: CHIROPRACTOR

## 2023-07-13 RX ORDER — TRAZODONE HYDROCHLORIDE 100 MG/1
50 TABLET ORAL
Qty: 90 TABLET | Refills: 0 | OUTPATIENT
Start: 2023-07-13

## 2023-07-13 RX ORDER — MONTELUKAST SODIUM 10 MG/1
10 TABLET ORAL
Refills: 0 | OUTPATIENT
Start: 2023-07-13

## 2023-07-13 RX ORDER — LOSARTAN POTASSIUM 50 MG/1
50 TABLET ORAL DAILY
Qty: 90 TABLET | Refills: 0 | OUTPATIENT
Start: 2023-07-13 | End: 2024-07-12

## 2023-07-13 RX ORDER — ATORVASTATIN CALCIUM 10 MG/1
10 TABLET, FILM COATED ORAL
Qty: 90 TABLET | Refills: 0 | OUTPATIENT
Start: 2023-07-13

## 2023-07-13 RX ORDER — DOXYCYCLINE HYCLATE 100 MG/1
100 CAPSULE ORAL EVERY 12 HOURS SCHEDULED
Qty: 42 CAPSULE | Refills: 0 | Status: SHIPPED | OUTPATIENT
Start: 2023-07-13 | End: 2023-08-03

## 2023-07-13 NOTE — PROGRESS NOTES
HPI:    Albertina Paul returns for treatment today of neck and low back pain. She reports overall that her neck is feeling better. Reports no pain in the low back. Majority of her symptoms seem to be in her upper mid back area. Recent diagnosis of possible Lyme disease. Undergoing treatment for this. The following portions of the patient's history were reviewed and updated as appropriate: allergies, current medications, past family history, past medical history, past social history, past surgical history, and problem list.    Review of Systems    Physical Exam:  Parathoracic hypertonicity is noted with myofascial involvement most specifically in the right parathoracic region. Tightness noted as well. Thoracic right lateral bending and left and right rotation elicits pain. In addition there is  segmental dysfunction T6-T7    Active triggers of myofascial involvement bilateral trapezii, rhomboid, levator scapula musculature joint dysfunction C5-C6    Assessment:   Diagnosis ICD-10-CM Associated Orders   1. Neck pain  M54.2       2. Myofascial pain  M79.18       3. Chronic bilateral low back pain with left-sided sciatica  M54.42     G89.29       4. Cervical radiculopathy  M54.12                 Treatment: 19399  Therapeutic myofascial release performed to the bilateral shoulder girdle. I used Graston technique. I used GT 2, GT 3, and GT 4 instruments. Scap thoracic mobilizations performed and well-tolerated. This was a 12-minute procedure    Manipulation provided to the T6 level producing a good joint release and well-tolerated by the patient today    Discussion:  Continue flexion-based stretching daily icing see her back for follow-up.

## 2023-07-13 NOTE — TELEPHONE ENCOUNTER
Upload pictures pictures to [employer], start doxy 100 mg twice daily and plan to take it 10 days, I gave her 21-day supply. I need to do some research but I will let her know at some point when to stop it. I also want her to let me know how she is feeling in a few days.   I will be in [facility] next week and I do not know if I am going to have access to the computer, [place]

## 2023-07-13 NOTE — TELEPHONE ENCOUNTER
Patient got disconnected from   He was giving instructions on her doxycycline, wanted her to send pictures of a rash that I have, and she is not sure how he wants her to do that.  [phone]

## 2023-07-17 DIAGNOSIS — I10 HYPERTENSION, ESSENTIAL: ICD-10-CM

## 2023-07-17 DIAGNOSIS — E78.2 MIXED HYPERLIPIDEMIA: ICD-10-CM

## 2023-07-17 RX ORDER — LOSARTAN POTASSIUM 50 MG/1
50 TABLET ORAL DAILY
Qty: 90 TABLET | Refills: 1 | Status: SHIPPED | OUTPATIENT
Start: 2023-07-17 | End: 2024-07-16

## 2023-07-17 RX ORDER — ATORVASTATIN CALCIUM 10 MG/1
10 TABLET, FILM COATED ORAL
Qty: 90 TABLET | Refills: 0 | Status: SHIPPED | OUTPATIENT
Start: 2023-07-17

## 2023-07-18 ENCOUNTER — OFFICE VISIT (OUTPATIENT)
Dept: BARIATRICS | Facility: CLINIC | Age: 63
End: 2023-07-18
Payer: COMMERCIAL

## 2023-07-18 VITALS
WEIGHT: 156.8 LBS | BODY MASS INDEX: 25.2 KG/M2 | DIASTOLIC BLOOD PRESSURE: 74 MMHG | RESPIRATION RATE: 18 BRPM | SYSTOLIC BLOOD PRESSURE: 118 MMHG | OXYGEN SATURATION: 97 % | HEART RATE: 84 BPM | HEIGHT: 66 IN

## 2023-07-18 DIAGNOSIS — M79.18 MYOFASCIAL PAIN SYNDROME: ICD-10-CM

## 2023-07-18 DIAGNOSIS — E55.9 VITAMIN D DEFICIENCY: ICD-10-CM

## 2023-07-18 DIAGNOSIS — E66.3 OVERWEIGHT: Primary | ICD-10-CM

## 2023-07-18 LAB — A PHAGOCYTOPH DNA BLD QL NAA+PROBE: NEGATIVE

## 2023-07-18 PROCEDURE — 99213 OFFICE O/P EST LOW 20 MIN: CPT | Performed by: FAMILY MEDICINE

## 2023-07-18 RX ORDER — METHOCARBAMOL 500 MG/1
500 TABLET, FILM COATED ORAL 3 TIMES DAILY PRN
Qty: 90 TABLET | Refills: 0 | Status: CANCELLED | OUTPATIENT
Start: 2023-07-18

## 2023-07-18 NOTE — PROGRESS NOTES
Assessment/Plan:  Fermin Cabrera was seen today for follow up    Diagnoses and all orders for this visit:    Overweight  Body mass index is 25.7 kg/m². Reviewed HbA1C TSH and CMP, vit D from PCP ordersn 2022 normal range  Patient did very well during her last year followed dietary advice and dietician orders  Able to achieve a good life balance and meal plan  Came today just to say thank you and discuss maintenance  No meds needed  Advice metabolic trsting to quantify her current energy expenditure   30 minute visit, >50% face-to-face time spent counseling patient on diet behavior and exercise modification for weight loss. Encouraged mindful eating, portion control, motivational interview performed to help patient reach goals   Patient noted changes to work on     Vitamin D deficiency  Advised 1000 units daily for life  Follow up as needed   Subjective:   Chief Complaint   Patient presents with   • Follow-up       Patient ID: Jodee Foster  is a 60 y.o. female with excess weight/obesity here to pursue weight management. Previous notes and records have been reviewed.           HPI  Wt Readings from Last 20 Encounters:   07/18/23 71.1 kg (156 lb 12.8 oz)   07/11/23 72.1 kg (159 lb)   07/06/23 73.9 kg (163 lb)   06/28/23 73.5 kg (162 lb)   06/05/23 73.5 kg (162 lb)   05/15/23 73.5 kg (162 lb)   04/24/23 73.5 kg (162 lb)   04/17/23 73.1 kg (161 lb 3.2 oz)   03/14/23 74.6 kg (164 lb 7.4 oz)   01/13/23 76.7 kg (169 lb)   01/11/23 (!) 172 kg (379 lb 3.1 oz)   01/04/23 78.1 kg (172 lb 3.2 oz)   12/14/22 77.7 kg (171 lb 3.2 oz)   12/06/22 76.7 kg (169 lb)   11/16/22 76.8 kg (169 lb 6.4 oz)   11/09/22 76.8 kg (169 lb 6.4 oz)   11/02/22 77.1 kg (170 lb)   11/01/22 77.1 kg (170 lb)   10/26/22 77.2 kg (170 lb 3.2 oz)   10/12/22 79.5 kg (175 lb 3.2 oz)     Dietician notes reviewed  Hydration: 8 glasses per day  Alcohol: 2 glasses wine 4 days   Smoking: none  Ex[facility]: [facility]  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  Sleep: well  STOP ban/8    Past Medical History:   Diagnosis Date   • Anxiety 2022   • Cervical radiculopathy 2018   • Cervical spondylosis without myelopathy 2018   • Chronic headaches    • Diverticulosis 2023   • History of colon polyps 2022   • Hypertension, essential 10/16/2019   • Internal hemorrhoids 2023   • Mixed hyperlipidemia 2018   • Non-seasonal allergic rhinitis    • Osteopenia of multiple sites 2019   • Other insomnia 2022   • Overweight    • Spinal stenosis of lumbar region with neurogenic claudication 10/22/2021   • Vitamin D deficiency 2020     Past Surgical History:   Procedure Laterality Date   • BREAST EXCISIONAL BIOPSY Left    • BREAST EXCISIONAL BIOPSY Left     benign   • EGD  2018   • LAMINECTOMY AND MICRODISCECTOMY LUMBAR SPINE       The following portions of the patient's history were reviewed and updated as appropriate: allergies, current medications, past family history, past medical history, past social history, past surgical history, and problem list.    ROS:  Review of Systems   Constitutional: Negative for activity change. Fatigue  HENT: Negative for trouble swallowing. Respiratory: Negative for shortness of breath. Cardiovascular: Negative for chest pain, edema  Gastrointestinal: Negative for abdominal pain, nausea and vomiting,+ acid reflux, sometimes alternatingconstipation/diarrhea  Psychiatric/Behavioral: Negative for behavioral problems and suicidal ideas.  + depression    Objective:  /74 (BP Location: Left arm, Patient Position: Sitting, Cuff Size: Adult)   Pulse 84   Resp 18   Ht 5' 5.5" (1.664 m)   Wt 71.1 kg (156 lb 12.8 oz)   LMP 2015 (Within Years)   SpO2 97%   BMI 25.70 kg/m²   Constitutional: Well-developed, well-nourished and Overweight Body mass index is 25.7 kg/m². [place] Alert, cooperative.     HEENT: No conjunctival pallor or jaundice  Pulmonary: No increased work of breathing or signs of respiratory distress. CV: Well-perfused, Normal rate    Vascular: no peripheral edema  GI: Abdomen obese, Non-distended  MSK: no sarcopenia noted   Neuro: Oriented to person, place and time. Normal Speech  Psych: Normal affect and mood. Normal thought process, no delusions Labs and Imaging  Recent labs and imaging have been personally reviewed.   Lab Results   Component Value Date    WBC 6.30 07/07/2023    HGB 13.0 07/07/2023    HCT 38.5 07/07/2023    MCV 91 07/07/2023     07/07/2023     Lab Results   Component Value Date    SODIUM 138 07/07/2023    K 3.8 07/07/2023     07/07/2023    CO2 29 07/07/2023    AGAP 7 07/07/2023    BUN 10 07/07/2023    CREATININE 0.66 07/07/2023    GLUC 105 07/07/2023    GLUF 99 08/29/2022    CALCIUM 9.3 07/07/2023    AST 29 07/07/2023    ALT 44 07/07/2023    ALKPHOS 60 07/07/2023    TP 7.1 07/07/2023    TBILI 0.41 07/07/2023    EGFR 94 07/07/2023     Lab Results   Component Value Date    HGBA1C 5.5 08/29/2022     Lab Results   Component Value Date    [id number] 2.180 08/29/2022     Lab Results   Component Value Date    CHOLESTEROL 200 08/29/2022     Lab Results   Component Value Date    HDL 77 08/29/2022     Lab Results   Component Value Date    TRIG 137 08/29/2022     Lab Results   Component Value Date    LDLCALC 96 08/29/2022

## 2023-07-20 DIAGNOSIS — M79.18 MYOFASCIAL PAIN SYNDROME: ICD-10-CM

## 2023-07-20 DIAGNOSIS — G47.09 OTHER INSOMNIA: ICD-10-CM

## 2023-07-20 RX ORDER — METHOCARBAMOL 500 MG/1
500 TABLET, FILM COATED ORAL 3 TIMES DAILY PRN
Qty: 90 TABLET | Refills: 3 | Status: SHIPPED | OUTPATIENT
Start: 2023-07-20

## 2023-07-20 RX ORDER — TRAZODONE HYDROCHLORIDE 100 MG/1
50 TABLET ORAL
Qty: 90 TABLET | Refills: 0 | Status: SHIPPED | OUTPATIENT
Start: 2023-07-20

## 2023-07-20 NOTE — TELEPHONE ENCOUNTER
Stan Baez or Lonnie   Pt is a new pt ok per Lonnie. Pt was seen last week by resident/Lonnie. They went over med list. But these were not filled. She is requesting RX's for the following:  Methocarbamol 500 mg  Trazadone 100 mg. [facility].    Call pt with questions [phone]

## 2023-07-25 ENCOUNTER — PROCEDURE VISIT (OUTPATIENT)
Age: 63
End: 2023-07-25
Payer: COMMERCIAL

## 2023-07-25 VITALS
WEIGHT: 156 LBS | HEIGHT: 66 IN | BODY MASS INDEX: 25.07 KG/M2 | SYSTOLIC BLOOD PRESSURE: 101 MMHG | DIASTOLIC BLOOD PRESSURE: 76 MMHG

## 2023-07-25 DIAGNOSIS — M54.12 CERVICAL RADICULOPATHY: ICD-10-CM

## 2023-07-25 DIAGNOSIS — M54.42 CHRONIC BILATERAL LOW BACK PAIN WITH LEFT-SIDED SCIATICA: ICD-10-CM

## 2023-07-25 DIAGNOSIS — M79.18 MYOFASCIAL PAIN: ICD-10-CM

## 2023-07-25 DIAGNOSIS — M54.2 NECK PAIN: Primary | ICD-10-CM

## 2023-07-25 DIAGNOSIS — G89.29 CHRONIC BILATERAL LOW BACK PAIN WITH LEFT-SIDED SCIATICA: ICD-10-CM

## 2023-07-25 PROCEDURE — 97140 MANUAL THERAPY 1/> REGIONS: CPT | Performed by: CHIROPRACTOR

## 2023-07-25 PROCEDURE — 98940 CHIROPRACT MANJ 1-2 REGIONS: CPT | Performed by: CHIROPRACTOR

## 2023-07-25 NOTE — PROGRESS NOTES
HPI:    Andre returns for treatment today of neck and low back pain. Increased right sided neck pain secondary to increased computer time. The following portions of the patient's history were reviewed and updated as appropriate: allergies, current medications, past family history, past medical history, past social history, past surgical history, and problem list.    Review of Systems    Physical Exam:  Parathoracic hypertonicity is noted with myofascial involvement most specifically in the right parathoracic region. Tightness noted as well. Thoracic right lateral bending and left and right rotation elicits pain. In addition there is  segmental dysfunction T6-T7    Active triggers of myofascial involvement bilateral trapezii, rhomboid, levator scapula musculature joint dysfunction C5-C6    Assessment:   Diagnosis ICD-10-CM Associated Orders   1. Neck pain  M54.2       2. Myofascial pain  M79.18       3. Chronic bilateral low back pain with left-sided sciatica  M54.42     G89.29       4. Cervical radiculopathy  M54.12                 Treatment: 45232  Therapeutic myofascial release performed to the bilateral shoulder girdle. I used Graston technique. I used GT 2, GT 3, and GT 4 instruments. Scap thoracic mobilizations performed and well-tolerated. This was a 12-minute procedure    Manipulation provided to the C5 level via seated stairstepping technique well-tolerated. Discussion:  Continue flexion-based stretching daily icing see her back for follow-up.

## 2023-08-11 ENCOUNTER — OFFICE VISIT (OUTPATIENT)
Dept: NEUROSURGERY | Facility: CLINIC | Age: 63
End: 2023-08-11
Payer: COMMERCIAL

## 2023-08-11 VITALS
SYSTOLIC BLOOD PRESSURE: 118 MMHG | BODY MASS INDEX: 25.62 KG/M2 | TEMPERATURE: 97.8 F | OXYGEN SATURATION: 98 % | DIASTOLIC BLOOD PRESSURE: 80 MMHG | HEIGHT: 65 IN | WEIGHT: 153.8 LBS | HEART RATE: 74 BPM

## 2023-08-11 DIAGNOSIS — I67.1 CEREBRAL ANEURYSM, NONRUPTURED: Primary | ICD-10-CM

## 2023-08-11 DIAGNOSIS — I67.1 INTRACRANIAL ANEURYSM: ICD-10-CM

## 2023-08-11 DIAGNOSIS — Q04.6 PORENCEPHALIC CYST (HCC): ICD-10-CM

## 2023-08-11 DIAGNOSIS — R51.9 HEADACHE: ICD-10-CM

## 2023-08-11 PROCEDURE — 99215 OFFICE O/P EST HI 40 MIN: CPT | Performed by: RADIOLOGY

## 2023-08-11 NOTE — PROGRESS NOTES
Assessment/Plan:    Cerebral aneurysm, nonruptured  · As addressed in HPI   Identified risk factors for aneurysm growth and rupture   · HTN HO, treating with Losartan --118/80  · HLD hO treating with atorvastatin   · Tobacco dependence  --no   · Alcohol--social   · Need for PCP over site of co- morbid risk conditions ever 3-6 months or as otherwise specified    · Identify if you have 2 or more first degree relatives diagnosed with known brain aneurysms or have  suddenly of an unknown cause --denies     Imagining   · 23 CTA NECK AND BRAIN WITH AND WITHOUT CONTRAST--Anatomic variation of the [place] likely representing 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 2 mm outpouching from this vestigial anastomosis represents an aneurysm. Recommend consultation with the [facility]. · Collaborative discussion with Dr. Aubrie Christine -no neurosurgical intervention indicated. Plan  Surveillance as per protocol. Plan   Dr Aubrie Christine met with patient and    · Discussed imagining result  with patient   · Explained  the aneurysm is likely unrelated  to your headache. · Extensively discussed natural history of aneurysms. · Discussed that based on UCAS patient has <1%/yr risk of aneurysm rupture. Other Factors addressed. · Discussed modifiable risk factors including hypertension, hyperlipidemia, and smoking.  --as above  · Family history?  --as above  · Discussed signs and symptoms of aneurysm rupture including severe, sudden onset headache, neck pain, nausea and vomiting, and seizure. Reiterated that the symptoms should prompt patient to visit in emergency department immediately.     · Ordered RTO in 1 year for surveillance f/u  Due 2024 --ordered CTA head wwo    If you experience ANY of the following, call 911 or our office IMMEDIATELY:  • SUDDEN severe headache ("worst headache of my life" )  • Seizures  • Nausea or vomiting  • Vision or speech disturbances  • Neck pain · Advised if you have additional questions or concerns call the office. Porencephalic cyst [facility])  · Patient had questions about her brain cyst and prior neurosurgery visit with Dr Ole Andres   · Prior imagining was not available during visit  definitative neurosurgery plan wa spending  Previous imagining  Uploads. Plan was to contact patient once office received to discuss management plan. · She sougt a second opinion [address] , Dr. 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 · Is asymptomatic   · Surgical options -  shunt vs cyst fenestration vs observation  · Goal -fenestrate  The cyst  Into the ventricle to communicate  The spaces, risks discussed, the intervention was not recommended. · Plan for MRI in one year December 2023. And continue serial MRI w/wo at appropriate intervals    Imagining   · 7/7/23 CTA NECK AND BRAIN WITH AND WITHOUT CONTRAST  ADDENDUM: Report note prior MRI brain is available from 5/27/2015. Review of the MRI December 29, 2022 was also performed. The cystic lesion described in left parietal lobe is stable in size when compared to the most recent prior MRI dated December 29, 2022 however has increased in size when compared to the prior MRI dated May 27, 2015. On the prior MRI dated May 27, 2015, the cyst measures 2.7 x 2.0 x 3.1 cm and on the current CT, this measures 4.3 x 2.9 x 5.1 cm. There is unchanged mass effect on the leftlateral ventricle when compared to the prior brain MRI from December 2022. Addended by Yefri Loera DO on 7/12/2023  3:58 PM     Dr. Michael Frederick met with patient   · Patient discussed  the prior visit with this office 1/11/2023  and the reason why she sought a second opinion but sh prefers to establish care with [facility] neurosurgery closer to home. · Dr Michael Frederick ordered updated MRI brain , he will review result. .   · I was not available for complete discussion regarding  Dr. Gregory Boyce role in further management and patient oversight. Diagnoses and all orders for this visit:    Headache  -     Ambulatory Referral to Neurosurgery        Subjective: RTO visit for aneurysm referral  And an E-consult with PCP Dr Rick Saab. Se is know to neurosurgery for a prior office visit 1/11/23 for a abnormal MRI finding  Large left frontoparietal junction parenchymal cyst.     Patient ID: Arelis Mast is a 60 y.o. female     HPI   Presented in Ed 7/7/23  As per ED provider notes c/o HA since Monday morning, started 4 days prior. She was drinking wine before headache started,  she thought she was dehydrated, drove home from [place] Monday morning. She has been taking OTC medications and went to urgent care and was given Naproxen which did not help much. She reported the headache was  throughout her whole head, worse in the back. She describes it as a pounding, worsening when she sits up or bends she reported photophobia. .States that she has migraines years ago but has not had them in a while. She has a history of a cyst on the brain since 2008. 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 2 mm outpouching from this vestigial anastomosis represents an aneurysm    She presents for initial neurosurgery visit. For imagining review and reassessment     1/11/23 Neurosurgery visit  for brain cyst  12//14/22 CT Brain Focal 3.8 x 2.8 x 4.7 cm (AP x TRV x CC) left parietal cystic lesion located superior to the posterior horn of the left lateral ventricle. There is no surrounding vasogenic edema. The lesion demonstrates no significant enhancement. There is secondary   mass effect along the superior aspect of the body and posterior horn of the left lateral ventricle. IMPRESSION:1. Focal left parietal nonenhancing cystic lesion located superior to the posterior horn of the left lateral ventricle.   There is no surrounding vasogenic edema. Differential diagnosis includes neuroglial cyst, ependymal cyst, versus solitary dilated perivascular space. MRI of the brain with contrast is recommended to assess for any subtle perilesional signal abnormality. 2. Small sessile osteoma is noted along the right parietal outer calvarial table. REVIEW OF SYSTEMS  Review of Systems   HENT: Positive for tinnitus (once in while). Eyes: Positive for visual disturbance (blurier than it was). Respiratory: Negative. Cardiovascular: Negative. Gastrointestinal: Negative. Endocrine: Negative. Genitourinary: Negative. Musculoskeletal: Positive for neck pain (between the shoulder blade) and neck stiffness (back of the neck from left to right). Negative for myalgias. Neurological: Positive for tremors (spasm between the shoulser blade), weakness (tired, lime deasese), light-headedness and headaches (constant ). Negative for dizziness, seizures, syncope, speech difficulty and numbness. Hematological: Bruises/bleeds easily. Psychiatric/Behavioral: Negative for confusion and sleep disturbance. The patient is not nervous/anxious.           Meds/Allergies     Current Outpatient Medications   Medication Sig Dispense Refill   • acetaminophen (TYLENOL) 500 mg tablet Take 1 tablet (500 mg total) by mouth every 6 (six) hours as needed for mild pain 60 tablet 0   • atorvastatin (LIPITOR) 10 mg tablet Take 1 tablet (10 mg total) by mouth daily at bedtime 90 tablet 0   • b complex vitamins capsule Take 1 capsule by mouth daily     • calcium gluconate 500 mg tablet Take 1,000 mg by mouth daily     • cholecalciferol (VITAMIN D3) 1,000 units tablet Take 1,000 Units by mouth daily     • fluticasone (FLONASE) 50 mcg/act nasal spray 2 sprays into each nostril daily OTC     • losartan (COZAAR) 50 mg tablet Take 1 tablet (50 mg total) by mouth daily 90 tablet 1   • methocarbamol (ROBAXIN) 500 mg tablet Take 1 tablet (500 mg total) by mouth 3 (three) times a day as needed for muscle spasms 90 tablet 3   • montelukast (SINGULAIR) 10 mg tablet Take 10 mg by mouth daily at bedtime     • Multiple Vitamin (multivitamin) tablet Take 1 tablet by mouth daily     • Omega-3 Fatty Acids (fish oil) 1,000 mg Take 1,000 mg by mouth daily     • Probiotic Product (PROBIOTIC-10 PO) Take 1 tablet by mouth in the morning     • traZODone (DESYREL) 100 mg tablet Take 0.5 tablets (50 mg total) by mouth daily at bedtime as needed for sleep 90 tablet 0   • zinc gluconate 50 mg tablet Take 50 mg by mouth daily     • Butalbital-APAP-Caffeine (Fioricet) -40 MG CAPS Take 50 mg by mouth every 6 (six) hours as needed (headache) (Patient not taking: Reported on 7/18/2023) 30 capsule 0   • indomethacin (INDOCIN SR) 75 mg CR capsule Take 1 capsule (75 mg total) by mouth 2 (two) times a day with meals (Patient not taking: Reported on 7/18/2023) 30 capsule 0   • naproxen (Naprosyn) 500 mg tablet Take 1 tablet (500 mg total) by mouth 2 (two) times a day with meals (Patient not taking: Reported on 7/18/2023) 60 tablet 0     No current facility-administered medications for this visit.        No Known Allergies    PAST HISTORY    Past Medical History:   Diagnosis Date   • Anxiety 07/29/2022   • Cervical radiculopathy 11/01/2018   • Cervical spondylosis without myelopathy 11/01/2018   • Chronic headaches    • Diverticulosis 03/14/2023   • History of colon polyps 07/29/2022   • Hypertension, essential 10/16/2019   • Internal hemorrhoids 03/14/2023   • Mixed hyperlipidemia 07/11/2018   • Non-seasonal allergic rhinitis    • Osteopenia of multiple sites 07/24/2019   • Other insomnia 07/29/2022   • Overweight    • Spinal stenosis of lumbar region with neurogenic claudication 10/22/2021   • Vitamin D deficiency 12/08/2020       Past Surgical History:   Procedure Laterality Date   • BREAST EXCISIONAL BIOPSY Left 1991   • BREAST EXCISIONAL BIOPSY Left     benign   • EGD  02/26/2018   • LAMINECTOMY AND MICRODISCECTOMY LUMBAR SPINE         Social History     Tobacco Use   • Smoking status: Never   • Smokeless tobacco: Never   Vaping Use   • Vaping Use: Never used   Substance Use Topics   • Alcohol use: Yes     Alcohol/week: 8.0 standard drinks of alcohol     Types: 8 Glasses of wine per week   • Drug use: Never       Family History   Adopted: Yes   Problem Relation Age of Onset   • No Known Problems Mother         Estranged   • No Known Problems Father         Estranged   • No Known Problems Daughter    • No Known Problems Daughter    • No Known Problems Son    • Asperger's syndrome Son    • Autism spectrum disorder Son    • Bipolar disorder Son    • Anxiety disorder Son        The following portions of the patient's history were reviewed and updated as appropriate: allergies, current medications, past family history, past medical history, past social history, past surgical history and problem list.      EXAM    Vitals:Blood pressure 118/80, pulse 74, temperature 97.8 °F (36.6 °C), temperature source Temporal, height 5' 5" (1.651 m), weight 69.8 kg (153 lb 12.8 oz), last menstrual period 01/01/2015, SpO2 98 %, not currently breastfeeding. ,Body mass index is 25.59 kg/m². Physical Exam  Vitals and nursing note reviewed. Constitutional:       General: She is not in acute distress. Appearance: Normal appearance. She is not ill-appearing, toxic-appearing or diaphoretic. HENT:      Head: Normocephalic and atraumatic. Eyes:      General: No scleral icterus. Right eye: No discharge. Left eye: No discharge. Pulmonary:      Effort: Pulmonary effort is normal. No respiratory distress. Neurological:      Mental Status: She is alert and oriented to person, place, and time. Gait: Gait is intact.  Gait normal.   Psychiatric:         Mood and Affect: Mood normal.         Speech: Speech normal.         Behavior: Behavior normal.         Neurologic Exam     Mental Status   Oriented to person, place, and time. Attention: normal. Concentration: normal.   Speech: speech is normal   Level of consciousness: alert  Knowledge: good. Gait, Coordination, and Reflexes     Gait  Gait: normal      Imaging Studies    7/7/23 CTA NECK AND BRAIN WITH AND WITHOUT CONTRAST   ADDENDUM: Report note prior MRI brain is available from 5/27/2015. Review of the MRI December 29, 2022 was also performed. The cystic lesion described in left parietal lobe is stable in size when compared to the most recent prior MRI dated December 29, 2022 however has increased in size when compared to the prior MRI dated May 27, 2015. On the prior MRI dated May 27, 2015, the cyst measures 2.7 x 2.0 x 3.1 cm and on the current CT, this measures 4.3 x 2.9 x 5.1 cm. There is unchanged mass effect on the leftlateral ventricle when compared to the prior brain MRI from December 2022.   Addended by Ivania Rodarte DO on 7/12/2023  3:58 PM     INDICATION: Headache     COMPARISON:   CT brain December 14, 2022 and MRI brain December 29, 2022     TECHNIQUE:  Routine CT imaging of the Brain without contrast.  Post contrast imaging was performed after administration of iodinated contrast through the neck and brain. Post contrast axial 0.625 mm images timed to opacify the arterial system.     3D rendering was performed on an independent workstation. MIP reconstructions performed. Coronal reconstructions were performed of the noncontrast portion of the brain.     Radiation dose length product (DLP) for this visit:  8169 mGy-cm .   This examination, like all CT scans performed in the [facility], was performed utilizing techniques to minimize radiation dose exposure, including the use of iterative   reconstruction and automated exposure control.     IV Contrast:  100 mL of iodixanol (VISIPAQUE)     IMAGE QUALITY:   Diagnostic     FINDINGS:  NONCONTRAST BRAIN  PARENCHYMA: Unchanged size of cystic lesion in the left parietal lobe measuring 4.3 x 2.9 x 5.1 cm. No surrounding parenchymal edema. Local mass effect noted.     VENTRICLES AND EXTRA-AXIAL SPACES:  Normal for the patient's age.     VISUALIZED ORBITS: Normal visualized orbits.     PARANASAL SINUSES: Normal visualized paranasal sinuses.     CERVICAL VASCULATURE  AORTIC ARCH AND GREAT VESSELS:  Normal aortic arch and great vessel origins. Normal visualized subclavian vessels.     RIGHT VERTEBRAL ARTERY CERVICAL SEGMENT:  Normal origin. The vessel is normal in caliber throughout the neck.     LEFT VERTEBRAL ARTERY CERVICAL SEGMENT:  Normal origin. The vessel is normal in caliber throughout the neck.     RIGHT EXTRACRANIAL CAROTID SEGMENT:  Normal caliber common carotid artery. Normal bifurcation and cervical internal carotid artery. No stenosis or dissection.     LEFT EXTRACRANIAL CAROTID SEGMENT:  Normal caliber common carotid artery. Normal bifurcation and cervical internal carotid artery. No stenosis or dissection.     NASCET criteria was used to determine the degree of internal carotid artery diameter stenosis.     INTRACRANIAL VASCULATURE  INTERNAL CAROTID ARTERIES: Anatomic variation as described below with a anastomosis between the anterior and posterior circulation likely a variant of a right persistent trigeminal artery coursing medially from the cavernous carotid into the sella then   posteriorly through the dorsum sellae to attach to the basilar artery. In the intrasellar portion, there is a 2 mm medially directed outpouching likely a small aneurysm series 5 image 192.     ANTERIOR CIRCULATION:  Symmetric A1 segments and anterior cerebral arteries with normal enhancement.   Normal anterior communicating artery.     MIDDLE CEREBRAL ARTERY CIRCULATION:  M1 segment and middle cerebral artery branches demonstrate normal enhancement bilaterally.     DISTAL VERTEBRAL ARTERIES: Distal vertebral arteries are small in caliber becoming markedly hypoplastic at the origin of the PICA bilaterally.     BASILAR ARTERY: Proximal basilar artery is a small caliber vessel normalizing in its midportion secondary to the presence of a anastomosis between the right cavernous ICA and mid basilar artery. This vessel courses medially into the posterior aspect of   the sella then posteriorly through the dorsum sella attaching to the basilar artery beyond which the basilar is normal in caliber.     POSTERIOR CEREBRAL ARTERIES: Bilateral posterior cerebral arteries arise from a fetal origin.     VENOUS STRUCTURES:  Normal.      NON VASCULAR ANATOMY  BONY STRUCTURES:  No acute osseous abnormality.   SOFT TISSUES OF THE NECK:  Unremarkable.   THORACIC INLET:  Normal.      IMPRESSION:   No acute intracranial abnormality. Stable size cystic lesion left parietal lobe.   No significant carotid or focal vertebral artery stenosis.   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 2 mm outpouching from this vestigial anastomosis represents an aneurysm. Recommend consultation with the [facility], a  division of [facility] at [phone]. .            I have personally reviewed pertinent reports. and I have personally reviewed pertinent films in PACS    I have spent a total time of 50 minutes on 08/12/23 in caring for this patient including Diagnostic results, Risks and benefits of tx options, Instructions for management, Patient and family education, Importance of tx compliance, Risk factor reductions, Impressions, Counseling / Coordination of care, Documenting in the medical record, Reviewing / ordering tests, medicine, procedures  , Obtaining or reviewing history   and Communicating with other healthcare professionals .

## 2023-08-12 PROBLEM — Q04.6 PORENCEPHALIC CYST (HCC): Status: ACTIVE | Noted: 2023-08-12

## 2023-08-12 PROBLEM — G93.0 BRAIN CYST: Status: ACTIVE | Noted: 2023-08-12

## 2023-08-13 NOTE — ASSESSMENT & PLAN NOTE
· Patient had questions about her brain cyst and prior neurosurgery visit with Dr Almita Child   · Prior imagining was not available during visit  definitative neurosurgery plan wa spending  Previous imagining  Uploads. Plan was to contact patient once office received to discuss management plan. · She sougt a second opinion [facility] , Dr. 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 · Is asymptomatic   · Surgical options -  shunt vs cyst fenestration vs observation  · Goal -fenestrate  The cyst  Into the ventricle to communicate  The spaces, risks discussed, the intervention was not recommended. · Plan for MRI in one year December 2023. And continue serial MRI w/wo at appropriate intervals    Imagining   · 7/7/23 CTA NECK AND BRAIN WITH AND WITHOUT CONTRAST  ADDENDUM: Report note prior MRI brain is available from 5/27/2015. Review of the MRI December 29, 2022 was also performed. The cystic lesion described in left parietal lobe is stable in size when compared to the most recent prior MRI dated December 29, 2022 however has increased in size when compared to the prior MRI dated May 27, 2015. On the prior MRI dated May 27, 2015, the cyst measures 2.7 x 2.0 x 3.1 cm and on the current CT, this measures 4.3 x 2.9 x 5.1 cm. There is unchanged mass effect on the leftlateral ventricle when compared to the prior brain MRI from December 2022. Addended by [address] [place] [address] on 7/12/2023  3:58 PM     Dr. Vahid Alberto met with patient   · Patient discussed  the prior visit with this office 1/11/2023  and the reason why she sought a second opinion but  prefers to establish care with [facility] neurosurgery closer to home. · Dr Vahid Alberto ordered updated MRI brain , he will review result. .   · I was not available for complete discussion regarding  Dr. Mamadou Jon role in further management and patient oversight.

## 2023-08-13 NOTE — ASSESSMENT & PLAN NOTE
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 2 mm outpouching from this vestigial anastomosis represents an aneurysm. Recommend consultation with the [facility]. · Collaborative discussion with Dr. Gray Rojas -no neurosurgical intervention indicated. Plan  Surveillance as per protocol. Plan   Dr Gray Rojas met with patient and    · Discussed imagining result  with patient   · Explained  the aneurysm is likely unrelated  to your headache. · Extensively discussed natural history of aneurysms. · Discussed that based on UCAS patient has <1%/yr risk of aneurysm rupture. Other Factors addressed. · Discussed modifiable risk factors including hypertension, hyperlipidemia, and smoking.  --as above  · Family history?  --as above  · Discussed signs and symptoms of aneurysm rupture including severe, sudden onset headache, neck pain, nausea and vomiting, and seizure. Reiterated that the symptoms should prompt patient to visit in emergency department immediately.     · Ordered RTO in 1 year for surveillance f/u  Due 2024 --ordered CTA head wwo    If you experience ANY of the following, call 911 or our office IMMEDIATELY:  • SUDDEN severe headache ("worst headache of my life" [facility])  • Seizures  • Nausea or vomiting  • Vision or speech disturbances  • Neck pain     · Advised if you have additional questions or concerns call the office.

## 2023-08-14 ENCOUNTER — TELEPHONE (OUTPATIENT)
Dept: NEUROLOGY | Facility: CLINIC | Age: 63
End: 2023-08-14

## 2023-08-14 NOTE — TELEPHONE ENCOUNTER
I called and spoke with the patient; offered sooner appt 8/15 at 2:30 pm with Dr. Suzanne Aranda; patient accepted; address reconfirmed with the patient.

## 2023-08-15 ENCOUNTER — CONSULT (OUTPATIENT)
Dept: NEUROLOGY | Facility: CLINIC | Age: 63
End: 2023-08-15
Payer: COMMERCIAL

## 2023-08-15 VITALS
WEIGHT: 132.8 LBS | SYSTOLIC BLOOD PRESSURE: 110 MMHG | HEART RATE: 74 BPM | BODY MASS INDEX: 21.34 KG/M2 | HEIGHT: 66 IN | DIASTOLIC BLOOD PRESSURE: 80 MMHG

## 2023-08-15 DIAGNOSIS — R51.9 HEADACHE: ICD-10-CM

## 2023-08-15 DIAGNOSIS — G43.009 MIGRAINE WITHOUT AURA AND WITHOUT STATUS MIGRAINOSUS, NOT INTRACTABLE: Primary | ICD-10-CM

## 2023-08-15 PROCEDURE — 99204 OFFICE O/P NEW MOD 45 MIN: CPT | Performed by: STUDENT IN AN ORGANIZED HEALTH CARE EDUCATION/TRAINING PROGRAM

## 2023-08-15 PROCEDURE — 96372 THER/PROPH/DIAG INJ SC/IM: CPT | Performed by: STUDENT IN AN ORGANIZED HEALTH CARE EDUCATION/TRAINING PROGRAM

## 2023-08-15 RX ORDER — DEXAMETHASONE 2 MG/1
TABLET ORAL
Qty: 8 TABLET | Refills: 0 | Status: SHIPPED | OUTPATIENT
Start: 2023-08-15 | End: 2023-08-20

## 2023-08-15 RX ORDER — KETOROLAC TROMETHAMINE 30 MG/ML
30 INJECTION, SOLUTION INTRAMUSCULAR; INTRAVENOUS ONCE
Status: COMPLETED | OUTPATIENT
Start: 2023-08-15 | End: 2023-08-15

## 2023-08-15 RX ADMIN — KETOROLAC TROMETHAMINE 30 MG: 30 INJECTION, SOLUTION INTRAMUSCULAR; INTRAVENOUS at 16:37

## 2023-08-15 NOTE — PROGRESS NOTES
[facility] Neurology Consult  PATIENT:  Odalys Salinas  MRN:  [id number]  :  1960  DATE OF SERVICE:  8/15/2023  REFERRED BY: Jeanne Bingham MD  PMD: Chandrakant Fleming MD    Assessment/Plan:     Odalys Salinas is a very pleasant 62 y.o. female with a past medical history that includes HTN, cervical radiculopathy, depression, anxiety who presents for evaluation of headaches.       Chronic daily headaches  Preventative:  - we discussed headache hygiene and lifestyle factors that may improve headaches, including sleep hygiene, proper hydration 60-80 oz daily, OTC supplements, limiting caffeine intake     Abortive:  - discussed not taking over-the-counter or prescription pain medications more than 3 days per week to prevent medication overuse/rebound headache  - toradol injection given in office today for ongoing headaches. Will also give 5 day course of dexamethasone as a cycle breaker    CC:   headaches    History of Present Illness:   62 y.o. female with a past medical history that includes HTN, cervical radiculopathy, depression, anxiety who presents for evaluation of headaches.       Interval Hx  She states that after she has decreased her caffeine and alcohol intake and has had significant improvement in her headache frequency. She is currently having only about 3-4 per month, which has significantly decreased from almost daily previously.      Previous hx  Referred to neurology after she was seen at [place] about 1 month ago.  History of lumbar and cervical stenosis  Has had headaches growing up. 1 year ago noticed lump in head. Ct scan showed bony prominence and cyst which had slightly grown over time since . Not thought to be symptomatic at all     Headaches are band-like. Notices worsening   Felt somewhat better after she was given medication in the ED. Had covid .   Had severe headache from lyme      Primarily behind the eyes and up in the forehead. If grabs hair and pulls  scalp seems to relieve   Never has had a diagnosis of migraines in the past     Vision blurry with some of these headaches.      Ophthalmologist - no papilledema at [facility]     Has headaches every day. During stressful situations worsens the headaches  No nausea with them      How often do the headaches occur?   - as of 1/25/2024: constant headaches  What time of the day do the headaches start?  No particular time of day   How long do the headaches last? constant  Are you ever headache free? No     Aura? without aura     Where is your headache located and pain quality? Bifrontal, occipital  What is the intensity of pain? Average: 0-5/10  Associated symptoms:   [] Nausea       [] Vomiting        [] Diarrhea  [x] Stiff or sore neck   [x] Problems with concentration  [x] Photophobia     []Phonophobia      [] Osmophobia  [] Blurred vision   [] Prefer quiet, dark room  [x] Light-headed or dizzy     [] Tinnitus   [] Hands or feet tingle or feel numb/paresthesias       [] Ptosis      [] Facial droop  [] Lacrimation  [] Nasal congestion/rhinorrhea          Things that make the headache worse? No specific movements     Headache triggers: stress     Have you seen someone else for headaches or pain? No  Have you had trigger point injection performed and how often? No  Have you had Botox injection performed and how often? No   Have you had epidural injections or transforaminal injections performed? No  Have you ever had any Brain imaging? no     What medications do you take or have you taken for your headaches?   ABORTIVE:     Ibuprofen   excedrin      PREVENTIVE:   none     LIFESTYLE  Sleep   - averages: having nightmares since covid.   Problems falling asleep?:   Yes  Problems staying asleep?:  Yes     Water: 64 oz+  Caffeine: 1 cup coffee    Past Medical History:     Past Medical History:   Diagnosis Date    Anxiety 07/29/2022    Cervical radiculopathy 11/01/2018    Cervical spondylosis without myelopathy 11/01/2018     Chronic headaches     Diverticulosis 03/14/2023    History of colon polyps 07/29/2022    Hypertension, essential 10/16/2019    Internal hemorrhoids 03/14/2023    Mixed hyperlipidemia 07/11/2018    Non-seasonal allergic rhinitis     Osteopenia of multiple sites 07/24/2019    Other insomnia 07/29/2022    Overweight     Spinal stenosis of lumbar region with neurogenic claudication 10/22/2021    Vitamin D deficiency 12/08/2020       Patient Active Problem List   Diagnosis    Vitamin D deficiency    Spinal stenosis of lumbar region with neurogenic claudication    Osteopenia of multiple sites    Mixed hyperlipidemia    Hypertension, essential    Cervical spondylosis without myelopathy    Cervical radiculopathy    History of colon polyps    Anxiety    Other insomnia    Non-seasonal allergic rhinitis    Overweight    Major depressive disorder with single episode, in remission (HCC)    Intervertebral disc disorder with radiculopathy of lumbar region    Aneurysm (HCC)    Cerebral aneurysm, nonruptured    Headache    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HCC)       Medications:      Current Outpatient Medications   Medication Sig Dispense Refill    acetaminophen (TYLENOL) 500 mg tablet Take 1 tablet (500 mg total) by mouth every 6 (six) hours as needed for mild pain 60 tablet 0    atorvastatin (LIPITOR) 10 mg tablet Take 1 tablet (10 mg total) by mouth daily at bedtime 90 tablet 0    b complex vitamins capsule Take 1 capsule by mouth daily      calcium gluconate 500 mg tablet Take 1,000 mg by mouth daily      cholecalciferol (VITAMIN D3) 1,000 units tablet Take 1,000 Units by mouth daily      fluticasone (FLONASE) 50 mcg/act nasal spray 2 sprays into each nostril daily OTC      losartan (COZAAR) 50 mg tablet Take 1 tablet (50 mg total) by mouth daily 90 tablet 1    methocarbamol (ROBAXIN) 500 mg tablet Take 1 tablet (500 mg total) by mouth 3 (three) times a day as needed for muscle spasms 90 tablet 3    montelukast (SINGULAIR) 10 mg tablet Take  10 mg by mouth daily at bedtime      Multiple Vitamin (multivitamin) tablet Take 1 tablet by mouth daily      Omega-3 Fatty Acids (fish oil) 1,000 mg Take 1,000 mg by mouth daily      Probiotic Product (PROBIOTIC-10 PO) Take 1 tablet by mouth in the morning      traZODone (DESYREL) 100 mg tablet Take 0.5 tablets (50 mg total) by mouth daily at bedtime as needed for sleep 90 tablet 0    zinc gluconate 50 mg tablet Take 50 mg by mouth daily      Butalbital-APAP-Caffeine (Fioricet) -40 MG CAPS Take 50 mg by mouth every 6 (six) hours as needed (headache) (Patient not taking: Reported on 7/18/2023) 30 capsule 0    indomethacin (INDOCIN SR) 75 mg CR capsule Take 1 capsule (75 mg total) by mouth 2 (two) times a day with meals (Patient not taking: Reported on 7/18/2023) 30 capsule 0    naproxen (Naprosyn) 500 mg tablet Take 1 tablet (500 mg total) by mouth 2 (two) times a day with meals (Patient not taking: Reported on 7/18/2023) 60 tablet 0     No current facility-administered medications for this visit.        Allergies:    No Known Allergies    Family History:     Family History   Adopted: Yes   Problem Relation Age of Onset    No Known Problems Mother         Estranged    No Known Problems Father         Estranged    No Known Problems Daughter     No Known Problems Daughter     No Known Problems Son     Asperger's syndrome Son     Autism spectrum disorder Son     Bipolar disorder Son     Anxiety disorder Son        Social History:       Social History     Socioeconomic History    Marital status: /Civil Union     Spouse name: Not on file    Number of children: 4    Years of education: Not on file    Highest education level: Not on file   Occupational History    Occupation: Director of Nursing for Acute Care     Employer: [employer]   Tobacco Use    Smoking status: Never    Smokeless tobacco: Never   Vaping Use    Vaping Use: Never used   Substance and Sexual Activity    Alcohol use: Yes      "Alcohol/week: 8.0 standard drinks of alcohol     Types: 8 Glasses of wine per week    Drug use: Never    Sexual activity: Yes     Partners: Male     Birth control/protection: Male Sterilization, Post-menopausal     Comment: Vasectomy   Other Topics Concern    Not on file   Social History Narrative        Lives with  Thiago, Adult Son Juan Francisco    4 Children - 2 Sons, 2 Daughters    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[employer]     Social Determinants of Health     Financial Resource Strain: Not on file   Food Insecurity: Not on file   Transportation Needs: Not on file   Physical Activity: Not on file   Stress: Not on file   Social Connections: Not on file   Intimate Partner Violence: Not on file   Housing Stability: Not on file         Objective:   /80 (BP Location: Left arm, Patient Position: Sitting, Cuff Size: Large)   Pulse 74   Ht 5' 5.5\" (1.664 m)   Wt 60.2 kg (132 lb 12.8 oz)   LMP 01/01/2015 (Within Years)   BMI 21.76 kg/m²     General: Patient is not in any acute/apparent distress, well nourished, well developed and cooperative.   HEENT: normocephalic, atraumatic, moist membranes  Neck: supple  Extremities: no edema noted   Skin: no lesions or rash  Musculosketal: no bony abnormalities     Neurologic Examination:   Mental status: alert, awake, oriented X 3 and following commands.      Speech/Language: Speech is fluent without any dysarthria, no aphasia noted, can name, comprehension intact     Cranial Nerves:   CN I: smell not tested  CN II: Visual fields full to confrontation  CN III, IV, VI: Extraocular movements intact bilaterally. Pupils equal round and reactive to light bilaterally.  CN V: Facial sensation is normal.  CN VII: Full and symmetric facial movement.  CN VIII: Hearing is normal.  CN IX, X: Palate elevates symmetrically.  CN XI: Shoulder shrug strength is normal.  CN XII: Tongue midline without atrophy or fasciculations.     Motor:   Strength 5/5 in all 4 " extremities. Bulk/tone - normal.  Fasiculations - none     Sensory:   Sensation intact to soft touch in all 4 extremities.     Cerebellar:   Finger-to-nose intact, normal heel to shin.     Reflexes: 2+ in all 4 extremities  Pathologic reflexes - babinski reflex negative     Gait:   Normal gait, Romberg sign negative         Review of Systems:     ROS:    Review of Systems   Constitutional: Positive for fatigue. Negative for appetite change and fever.   HENT: Positive for tinnitus. Negative for hearing loss, trouble swallowing and voice change.    Eyes: Positive for photophobia, pain and visual disturbance (Floaters).   Respiratory: Negative.  Negative for shortness of breath.    Cardiovascular: Negative.  Negative for palpitations.   Gastrointestinal: Negative.  Negative for nausea and vomiting.   Endocrine: Negative.  Negative for cold intolerance.   Genitourinary: Negative.  Negative for dysuria, frequency and urgency.   Musculoskeletal: Positive for back pain and neck pain. Negative for gait problem and myalgias.   Skin: Negative.  Negative for rash.   Allergic/Immunologic: Negative.    Neurological: Positive for light-headedness and headaches (Has a headache today it's 3-4). Negative for dizziness, tremors, seizures, syncope, facial asymmetry, speech difficulty, weakness and numbness.   Hematological: Negative.  Does not bruise/bleed easily.   Psychiatric/Behavioral: Negative for confusion, hallucinations and sleep disturbance.       I have spent 37 minutes with Patient on 8/15/23 in which greater than 50% of this time was spent in counseling/coordination of care regarding Risks and benefits of tx options, Instructions for management, Patient and family education, Impressions, Documenting in the medical record, Reviewing / ordering tests, medicine, procedures  , and Obtaining or reviewing history  . I also spent 15 minutes non face to face for this patient the same day.

## 2023-08-15 NOTE — PATIENT INSTRUCTIONS
Patient instructions      Additional Testing:   Neurodiagnostic workup: MRI Brain ordered    Headache/migraine treatment:   Abortive medications (for immediate treatment of a headache):   It is ok to take ibuprofen, acetaminophen or naproxen (Advil, Tylenol,  Aleve, Excedrin) if they help your headaches you should limit these to No more than 3 times a week to avoid medication overuse/rebound headaches.     For your more moderate to severe migraines take this medication early   Nurtec 75 mg as needed at the onset of migraines    - some people may have some side effects from this medication, most typically do not.  Common side effects include making you feel tired, palpitations, tingling or tightness of the face or chest.  Most people report the side effects are nothing compared to their migraines and do not mind these. If you have intolerable side effects we will stop.    Over the counter preventive supplements for headaches/migraines (if you try, try for 3 months straight)  (to take every day to help prevent headaches - not to take at the time of headache):  There are combo pills online of these - none of which regulated by FDA and double check dosing - take appropriate dose only once a day- preventa migraine, migravent, mind ease, migrelief   [x] Magnesium 400mg daily (If any diarrhea or upset stomach, decrease dose  as tolerated)  [x] Riboflavin (Vitamin B2) 400mg daily - try online   (FYI B2 may make your urine bright/neon yellow)  AND/OR  [] Herbal medication: Petasites/Butterbur 150 mg daily - try online  (When choosing your Butterbur online or in the store, beware that there are some poor preps containing pyrrolizidine alkaloids (PAs) that can be harmful to the liver. Therefore, do not use butterbur products that are not labeled as PA-free.)    Sleep and headache prevention:   [] Melatonin - you may take 3 mg nightly for sleep. You should take this 1 hour prior to bedtime consistently every night for it to  work. It works by gradually helping to adjust your sleep time over days to weeks, rather than immediately making you feel sleepy.      Lifestyle Recommendations:  [x] SLEEP - Maintain a regular sleep schedule: Adults need at least 7-8 hours of uninterrupted a night. Maintain good sleep hygiene:  Going to bed and waking up at consistent times, avoiding excessive daytime naps, avoiding caffeinated beverages in the evening, avoid excessive stimulation in the evening and generally using bed primarily for sleeping.  One hour before bedtime would recommend turning lights down lower, decreasing your activity (may read quietly, listen to music at a low volume). When you get into bed, should eliminate all technology (no texting, emailing, playing with your phone, iPad or tablet in bed).  [x] HYDRATION - Maintain good hydration.  Drink  2L of fluid a day (4 typical small water bottles)  [x] DIET - Maintain good nutrition. In particular don't skip meals and try and eat healthy balanced meals regularly.  [x] TRIGGERS - Look for other triggers and avoid them: Limit caffeine to 1-2 cups a day or less. Avoid dietary triggers that you have noticed bring on your headaches (this could include aged cheese, peanuts, MSG, aspartame and nitrates).  [x] EXERCISE - physical exercise as we all know is good for you in many ways, and not only is good for your heart, but also is beneficial for your mental health, cognitive health and  chronic pain/headaches. I would encourage at the least 5 days of physical exercise weekly for at least 30 minutes.     Education and Follow-up  [x] Please call with any questions or concerns. Of course if any new concerning symptoms go to the emergency department.  [x] Follow up in 4-5 months

## 2023-08-16 ENCOUNTER — PROCEDURE VISIT (OUTPATIENT)
Age: 63
End: 2023-08-16
Payer: COMMERCIAL

## 2023-08-16 VITALS — WEIGHT: 132.8 LBS | HEIGHT: 66 IN | BODY MASS INDEX: 21.34 KG/M2

## 2023-08-16 DIAGNOSIS — M54.42 CHRONIC BILATERAL LOW BACK PAIN WITH LEFT-SIDED SCIATICA: ICD-10-CM

## 2023-08-16 DIAGNOSIS — M54.2 NECK PAIN: Primary | ICD-10-CM

## 2023-08-16 DIAGNOSIS — M79.18 MYOFASCIAL PAIN: ICD-10-CM

## 2023-08-16 DIAGNOSIS — G89.29 CHRONIC BILATERAL LOW BACK PAIN WITH LEFT-SIDED SCIATICA: ICD-10-CM

## 2023-08-16 PROCEDURE — 97140 MANUAL THERAPY 1/> REGIONS: CPT | Performed by: CHIROPRACTOR

## 2023-08-16 PROCEDURE — 98941 CHIROPRACT MANJ 3-4 REGIONS: CPT | Performed by: CHIROPRACTOR

## 2023-08-16 NOTE — PROGRESS NOTES
HPI:    Diego Agee returns for treatment today of neck and low back pain. Increased right sided neck pain secondary to increased computer time. Also bothered with headaches. Low back is starting to bother her again. Unfortunately relates that recently after improving from a Lyme infection she came down with COVID with significant symptoms for couple of weeks she is now better but is having ongoing headaches. Was seen in neurology. They are suggesting some type of migraine variant. VPAS  3    The following portions of the patient's history were reviewed and updated as appropriate: allergies, current medications, past family history, past medical history, past social history, past surgical history, and problem list.    Review of Systems    Physical Exam:  Parathoracic hypertonicity is noted with myofascial involvement most specifically in the right parathoracic region. Tightness noted as well. Thoracic right lateral bending and left and right rotation elicits pain. In addition there is  segmental dysfunction T6-T7    Active triggers of myofascial involvement bilateral trapezii, rhomboid, levator scapula musculature joint dysfunction C5-C6    Pelvic obliquity is noted today with an elevated right versus left innominate internal rotation of the right innominate on sacrum paralumbar hypertonicity noted remaining exam stable joint dysfunction right SI L5-S1 motion unit. Assessment:   Diagnosis ICD-10-CM Associated Orders   1. Neck pain  M54.2       2. Myofascial pain  M79.18       3. Chronic bilateral low back pain with left-sided sciatica  M54.42     G89.29                 Treatment: 15223  Therapeutic myofascial release performed to the bilateral shoulder girdle. I used Graston technique. I used GT 2, GT 3, and GT 4 instruments. Scap thoracic mobilizations performed and well-tolerated.   This was a 12-minute procedure    Manipulation provided to the C5 level via seated stairstepping technique well-tolerated. Manipulation right innominate, sacrum, L5 via Velasquez drop maneuver this did alleviate pelvic obliquity and was well-tolerated. Discussion:  Continue flexion-based stretching daily icing see her back for follow-up.

## 2023-08-29 ENCOUNTER — PROCEDURE VISIT (OUTPATIENT)
Age: 63
End: 2023-08-29
Payer: COMMERCIAL

## 2023-08-29 ENCOUNTER — TELEPHONE (OUTPATIENT)
Dept: PAIN MEDICINE | Facility: CLINIC | Age: 63
End: 2023-08-29

## 2023-08-29 VITALS
SYSTOLIC BLOOD PRESSURE: 129 MMHG | DIASTOLIC BLOOD PRESSURE: 85 MMHG | RESPIRATION RATE: 16 BRPM | HEART RATE: 65 BPM | HEIGHT: 66 IN | WEIGHT: 132.12 LBS | BODY MASS INDEX: 21.23 KG/M2

## 2023-08-29 DIAGNOSIS — M54.2 NECK PAIN: ICD-10-CM

## 2023-08-29 DIAGNOSIS — M54.41 ACUTE BILATERAL LOW BACK PAIN WITH RIGHT-SIDED SCIATICA: Primary | ICD-10-CM

## 2023-08-29 DIAGNOSIS — M79.18 MYOFASCIAL PAIN: ICD-10-CM

## 2023-08-29 PROCEDURE — 98941 CHIROPRACT MANJ 3-4 REGIONS: CPT | Performed by: CHIROPRACTOR

## 2023-08-29 NOTE — PROGRESS NOTES
HPI:    Ava Delgadillo returns for treatment today reporting a slight increase in right-sided sciatica symptoms and low back pain. She reports that after last visit she did remarkably well tolerated a ride to [place] had a good weeks vacation there without incident however with lifting sleeping in different beds and the return trip home she noted increasing right-sided leg pain. She denies any symptoms on the left. Overall her neck and right shoulder blade area are better. She relates today no significant issues with headaches. VPAS  2-3  Neck  VPAS  5  Right leg    The following portions of the patient's history were reviewed and updated as appropriate: allergies, current medications, past family history, past medical history, past social history, past surgical history, and problem list.    Review of Systems    Physical Exam:  There is  segmental dysfunction T6-T7    Active triggers of myofascial involvement bilateral trapezii, rhomboid, levator scapula musculature joint dysfunction C5-C6    Pelvic obliquity is noted today with an elevated right versus left innominate internal rotation of the right innominate on sacrum paralumbar hypertonicity noted remaining exam stable joint dysfunction right SI L5-S1 motion unit. Assessment:   Diagnosis ICD-10-CM Associated Orders   1. Acute bilateral low back pain with right-sided sciatica  M54.41       2. Neck pain  M54.2       3. Myofascial pain  M79.18                 Treatment: 42051  Therapeutic myofascial release performed to the bilateral shoulder girdle. I used Graston technique. I used GT 2, GT 3, and GT 4 instruments. Scap thoracic mobilizations performed and well-tolerated. This was a 12-minute procedure    Manipulation provided to the C5 level via seated stairstepping technique well-tolerated. Manipulation right innominate, sacrum, L5 via Velasquez drop maneuver this did alleviate pelvic obliquity and was well-tolerated.       Discussion:  Continue flexion-based stretching daily icing see her back for follow-up.

## 2023-08-29 NOTE — TELEPHONE ENCOUNTER
Patient left message on scheduling line, would like repeat procedure. Returned patient call, lm to call me back at [phone] to dicuss which procedure she would like, neck or back, her pain score and how long since the last procedure did she have relief.

## 2023-09-11 ENCOUNTER — HOSPITAL ENCOUNTER (OUTPATIENT)
Age: 63
Discharge: HOME | End: 2023-09-11
Payer: COMMERCIAL

## 2023-09-11 DIAGNOSIS — M85.89 OSTEOPENIA OF MULTIPLE SITES: ICD-10-CM

## 2023-09-11 PROCEDURE — 77080 DXA BONE DENSITY AXIAL: CPT

## 2023-09-12 ENCOUNTER — TELEPHONE (OUTPATIENT)
Dept: PAIN MEDICINE | Facility: CLINIC | Age: 63
End: 2023-09-12

## 2023-09-12 ENCOUNTER — HOSPITAL ENCOUNTER (OUTPATIENT)
Dept: MRI IMAGING | Facility: HOSPITAL | Age: 63
Discharge: HOME | End: 2023-09-12
Attending: RADIOLOGY
Payer: COMMERCIAL

## 2023-09-12 DIAGNOSIS — Q04.6 PORENCEPHALIC CYST (HCC): ICD-10-CM

## 2023-09-12 PROCEDURE — 70553 MRI BRAIN STEM W/O & W/DYE: CPT

## 2023-09-12 PROCEDURE — G1004 CDSM NDSC: HCPCS

## 2023-09-12 PROCEDURE — A9585 GADOBUTROL INJECTION: HCPCS | Performed by: RADIOLOGY

## 2023-09-12 RX ORDER — GADOBUTROL 604.72 MG/ML
5 INJECTION INTRAVENOUS
Status: COMPLETED | OUTPATIENT
Start: 2023-09-12 | End: 2023-09-12

## 2023-09-12 RX ADMIN — GADOBUTROL 5 ML: 604.72 INJECTION INTRAVENOUS at 17:56

## 2023-09-12 NOTE — RESULT ENCOUNTER NOTE
Sent to PCP Dr. Yadira Calderon for further management.       Message sent to patient via [facility] patient portal.

## 2023-09-12 NOTE — TELEPHONE ENCOUNTER
Returned call to patient regarding scheduling repeat injection with Dr. Kenya Thompson. Left message on vm to call me back at [phone].

## 2023-09-13 ENCOUNTER — TELEPHONE (OUTPATIENT)
Dept: PAIN MEDICINE | Facility: CLINIC | Age: 63
End: 2023-09-13

## 2023-09-13 NOTE — TELEPHONE ENCOUNTER
----- Message from Theron Renteria MD sent at 9/12/2023  3:12 PM EDT -----  Ok to schedule    ----- Message -----  From: Barbara Fulton  Sent: 9/12/2023  12:53 PM EDT  To: MD Dr. Rupert Rivera would like to schedule a repeat Rt L4-L5 TFESI, last one was 4/27/23, she said she has more weakness and numbness than pain, 5/10, and it started coming back around the end of August.      Please advise.     Zack Turner

## 2023-09-14 ENCOUNTER — APPOINTMENT (OUTPATIENT)
Dept: LAB | Facility: CLINIC | Age: 63
End: 2023-09-14

## 2023-09-14 DIAGNOSIS — Z00.00 ENCOUNTER FOR GENERAL HEALTH EXAMINATION: ICD-10-CM

## 2023-09-14 LAB
CHOLEST SERPL-MCNC: 189 MG/DL
EST. AVERAGE GLUCOSE BLD GHB EST-MCNC: 103 MG/DL
HBA1C MFR BLD: 5.2 %
HDLC SERPL-MCNC: 78 MG/DL
LDLC SERPL CALC-MCNC: 88 MG/DL (ref 0–100)
NONHDLC SERPL-MCNC: 111 MG/DL
TRIGL SERPL-MCNC: 116 MG/DL

## 2023-09-14 PROCEDURE — 83036 HEMOGLOBIN GLYCOSYLATED A1C: CPT

## 2023-09-14 PROCEDURE — 36415 COLL VENOUS BLD VENIPUNCTURE: CPT

## 2023-09-14 PROCEDURE — 80061 LIPID PANEL: CPT

## 2023-09-15 NOTE — RESULT ENCOUNTER NOTE
Forwarded to ordering provider Dr. Tad Jung for management.       Message sent to patient via [facility] patient portal.

## 2023-10-04 ENCOUNTER — OFFICE VISIT (OUTPATIENT)
Age: 63
End: 2023-10-04
Payer: COMMERCIAL

## 2023-10-04 ENCOUNTER — HOSPITAL ENCOUNTER (OUTPATIENT)
Dept: RADIOLOGY | Facility: CLINIC | Age: 63
Discharge: HOME | End: 2023-10-04
Payer: COMMERCIAL

## 2023-10-04 VITALS
WEIGHT: 157 LBS | SYSTOLIC BLOOD PRESSURE: 122 MMHG | HEIGHT: 65 IN | BODY MASS INDEX: 26.16 KG/M2 | OXYGEN SATURATION: 95 % | DIASTOLIC BLOOD PRESSURE: 76 MMHG | HEART RATE: 86 BPM

## 2023-10-04 VITALS
HEART RATE: 68 BPM | TEMPERATURE: 97.8 F | OXYGEN SATURATION: 98 % | DIASTOLIC BLOOD PRESSURE: 73 MMHG | SYSTOLIC BLOOD PRESSURE: 117 MMHG | RESPIRATION RATE: 20 BRPM

## 2023-10-04 DIAGNOSIS — E66.3 OVERWEIGHT (BMI 25.0-29.9): ICD-10-CM

## 2023-10-04 DIAGNOSIS — I10 HYPERTENSION, ESSENTIAL: ICD-10-CM

## 2023-10-04 DIAGNOSIS — M51.16 INTERVERTEBRAL DISC DISORDER WITH RADICULOPATHY OF LUMBAR REGION: ICD-10-CM

## 2023-10-04 DIAGNOSIS — K64.5 PERIANAL VENOUS THROMBOSIS: Primary | ICD-10-CM

## 2023-10-04 PROCEDURE — 99203 OFFICE O/P NEW LOW 30 MIN: CPT | Performed by: COLON & RECTAL SURGERY

## 2023-10-04 PROCEDURE — 64484 NJX AA&/STRD TFRM EPI L/S EA: CPT | Performed by: ANESTHESIOLOGY

## 2023-10-04 PROCEDURE — 64483 NJX AA&/STRD TFRM EPI L/S 1: CPT | Performed by: ANESTHESIOLOGY

## 2023-10-04 RX ORDER — 0.9 % SODIUM CHLORIDE 0.9 %
4 VIAL (ML) INJECTION ONCE
Status: COMPLETED | OUTPATIENT
Start: 2023-10-04 | End: 2023-10-04

## 2023-10-04 RX ORDER — BUPIVACAINE HCL/PF 2.5 MG/ML
2 VIAL (ML) INJECTION ONCE
Status: COMPLETED | OUTPATIENT
Start: 2023-10-04 | End: 2023-10-04

## 2023-10-04 RX ORDER — METHYLPREDNISOLONE ACETATE 80 MG/ML
80 INJECTION, SUSPENSION INTRA-ARTICULAR; INTRALESIONAL; INTRAMUSCULAR; PARENTERAL; SOFT TISSUE ONCE
Status: COMPLETED | OUTPATIENT
Start: 2023-10-04 | End: 2023-10-04

## 2023-10-04 RX ADMIN — Medication 4 ML: at 14:29

## 2023-10-04 RX ADMIN — METHYLPREDNISOLONE ACETATE 80 MG: 80 INJECTION, SUSPENSION INTRA-ARTICULAR; INTRALESIONAL; INTRAMUSCULAR; PARENTERAL; SOFT TISSUE at 14:32

## 2023-10-04 RX ADMIN — IOHEXOL 1 ML: 300 INJECTION, SOLUTION INTRAVENOUS at 14:32

## 2023-10-04 RX ADMIN — BUPIVACAINE HYDROCHLORIDE 2 ML: 2.5 INJECTION, SOLUTION EPIDURAL; INFILTRATION; INTRACAUDAL at 14:32

## 2023-10-04 NOTE — DISCHARGE INSTR - LAB
Epidural Steroid Injection   WHAT YOU NEED TO KNOW:   An epidural steroid injection (DINORAH) is a procedure to inject steroid medicine into the epidural space. The epidural space is between your spinal cord and vertebrae. Steroids reduce inflammation and fluid buildup in your spine that may be causing pain. You may be given pain medicine along with the steroids. ACTIVITY  Do not drive or operate machinery today. No strenuous activity today - bending, lifting, etc.  You may resume normal activites starting tomorrow - start slowly and as tolerated. You may shower today, but no tub baths or hot tubs. You may have numbness for several hours from the local anesthetic. Please use caution and common sense, especially with weight-bearing activities. CARE OF THE INJECTION SITE  If you have soreness or pain, apply ice to the area today (20 minutes on/20 minutes off). Starting tomorrow, you may use warm, moist heat or ice if needed. You may have an increase or change in your discomfort for 36-48 hours after your treatment. Apply ice and continue with any pain medication you have been prescribed. Notify the [facility] if you have any of the following: redness, drainage, swelling, headache, stiff neck or fever above 100°F.    SPECIAL INSTRUCTIONS  Our office will contact you in approximately 7 days for a progress report. MEDICATIONS  Continue to take all routine medications. Our office may have instructed you to hold some medications. As no general anesthesia was used in today's procedure, you should not experience any side effects related to anesthesia. If you are diabetic, the steroids used in today's injection may temporarily increase your blood sugar levels after the first few days after your injection. Please keep a close eye on your sugars and alert the doctor who manages your diabetes if your sugars are significantly high from your baseline or you are symptomatic.      If you have a problem specifically related to your procedure, please call our office at [phone]. Problems not related to your procedure should be directed to your primary care physician.

## 2023-10-04 NOTE — PROGRESS NOTES
Assessment/Plan:  Patient is a 78-year-old woman who approximately 2 weeks ago felt a lump the anal margin with discomfort patient presents today for evaluation and treatment recommendations    Plan: Thrombosed external hemorrhoid right lateral position resolving without incident. No surgical excisional therapy indicated. 2 mm sebaceous cyst left posterior anal margin without infection or complication. Observation       There are no diagnoses linked to this encounter. Subjective:      Patient ID: Delfino Portillo is a 58 y.o. female. HPI patient is a 78-year-old woman with hemorrhoidal complaints presents for evaluation and treatment recommendations    The following portions of the patient's history were reviewed and updated as appropriate: allergies, current medications, past family history, past medical history, past social history, past surgical history and problem list.    Review of Systems   Constitutional: Negative for chills and fever. HENT: Negative for ear pain and sore throat. Eyes: Negative for pain and visual disturbance. Respiratory: Negative for cough and shortness of breath. Cardiovascular: Negative for chest pain and palpitations. Gastrointestinal: Negative for abdominal pain and vomiting. Genitourinary: Negative for dysuria and hematuria. Musculoskeletal: Negative for arthralgias and back pain. Skin: Negative for color change and rash. Neurological: Negative for seizures and syncope. All other systems reviewed and are negative. Objective:      /76   Pulse 86   Ht 5' 5" (1.651 m)   Wt 71.2 kg (157 lb)   LMP 01/01/2015 (Within Years)   SpO2 95%   BMI 26.13 kg/m²          Colorectal Physical Exam    Inspection of the anal margin reveals a resolving right lateral thrombosed external hemorrhoid.     2 mm sebaceous cyst without complication left posterior lateral position

## 2023-10-04 NOTE — H&P
History of Present Illness:  The patient is a 58 y.o. female who presents with complaints of right lower back and leg pain is here today for right L4-5 transforaminal epidural steroid injection    Past Medical History:   Diagnosis Date   • Anxiety 07/29/2022   • Cervical radiculopathy 11/01/2018   • Cervical spondylosis without myelopathy 11/01/2018   • Chronic headaches    • Diverticulosis 03/14/2023   • History of colon polyps 07/29/2022   • History of heart disease    • History of Helicobacter pylori infection    • Hypertension, essential 10/16/2019   • Internal hemorrhoids 03/14/2023   • Mitral valve prolapse    • Mixed hyperlipidemia 07/11/2018   • Non-seasonal allergic rhinitis    • Osteopenia of multiple sites 07/24/2019   • Other insomnia 07/29/2022   • Overweight    • Seasonal allergies    • Spinal stenosis of lumbar region with neurogenic claudication 10/22/2021   • Vitamin D deficiency 12/08/2020       Past Surgical History:   Procedure Laterality Date   • BREAST EXCISIONAL BIOPSY Left 1991   • BREAST EXCISIONAL BIOPSY Left     benign   • COLONOSCOPY  02/26/2018   • DILATION AND CURETTAGE OF UTERUS     • EGD  02/26/2018   • LAMINECTOMY AND MICRODISCECTOMY LUMBAR SPINE  2011         Current Outpatient Medications:   •  acetaminophen (TYLENOL) 500 mg tablet, Take 1 tablet (500 mg total) by mouth every 6 (six) hours as needed for mild pain, Disp: 60 tablet, Rfl: 0  •  atorvastatin (LIPITOR) 10 mg tablet, Take 1 tablet (10 mg total) by mouth daily at bedtime, Disp: 90 tablet, Rfl: 0  •  b complex vitamins capsule, Take 1 capsule by mouth daily, Disp: , Rfl:   •  Butalbital-APAP-Caffeine (Fioricet) -40 MG CAPS, Take 50 mg by mouth every 6 (six) hours as needed (headache), Disp: 30 capsule, Rfl: 0  •  calcium gluconate 500 mg tablet, Take 1,000 mg by mouth daily, Disp: , Rfl:   •  cholecalciferol (VITAMIN D3) 1,000 units tablet, Take 1,000 Units by mouth daily, Disp: , Rfl:   •  fluticasone (FLONASE) 50 mcg/act nasal spray, 2 sprays into each nostril daily OTC, Disp: , Rfl:   •  losartan (COZAAR) 50 mg tablet, Take 1 tablet (50 mg total) by mouth daily, Disp: 90 tablet, Rfl: 1  •  methocarbamol (ROBAXIN) 500 mg tablet, Take 1 tablet (500 mg total) by mouth 3 (three) times a day as needed for muscle spasms, Disp: 90 tablet, Rfl: 3  •  montelukast (SINGULAIR) 10 mg tablet, Take 10 mg by mouth daily at bedtime, Disp: , Rfl:   •  Multiple Vitamin (multivitamin) tablet, Take 1 tablet by mouth daily, Disp: , Rfl:   •  Omega-3 Fatty Acids (fish oil) 1,000 mg, Take 1,000 mg by mouth daily, Disp: , Rfl:   •  Probiotic Product (PROBIOTIC-10 PO), Take 1 tablet by mouth in the morning, Disp: , Rfl:   •  rimegepant sulfate (NURTEC) 75 mg TBDP, Take 1 tablet (75 mg total) by mouth daily as needed (at the onset of migraines), Disp: 8 tablet, Rfl: 3  •  traZODone (DESYREL) 100 mg tablet, Take 0.5 tablets (50 mg total) by mouth daily at bedtime as needed for sleep, Disp: 90 tablet, Rfl: 0  •  zinc gluconate 50 mg tablet, Take 50 mg by mouth daily, Disp: , Rfl:   No current facility-administered medications for this encounter. No Known Allergies    Physical Exam:   Vitals:    10/04/23 1416   BP: 104/65   Pulse: 72   Resp: 20   Temp: 97.8 °F (36.6 °C)   SpO2: 96%     General: Awake, Alert, Oriented x 3, Mood and affect appropriate  Respiratory: Respirations even and unlabored  Cardiovascular: Peripheral pulses intact; no edema  Musculoskeletal Exam: Right low back and leg pain    ASA Score: 3    Patient/Chart Verification  Patient ID Verified: Verbal  Consents Confirmed: To be obtained in the Pre-Procedure area  Interval H&P(within 24 hr) Complete (required for Outpatients and Surgery Admit only): To be obtained in the Pre-Procedure area  Allergies Reviewed: Yes  Anticoag/NSAID held?: NA  Currently on antibiotics?: No    Assessment:   1.  Intervertebral disc disorder with radiculopathy of lumbar region        Plan: Rt L4-L5 TFESI

## 2023-10-11 ENCOUNTER — TELEPHONE (OUTPATIENT)
Dept: RADIOLOGY | Facility: MEDICAL CENTER | Age: 63
End: 2023-10-11

## 2023-10-13 DIAGNOSIS — I10 HYPERTENSION, ESSENTIAL: ICD-10-CM

## 2023-10-13 DIAGNOSIS — G47.09 OTHER INSOMNIA: ICD-10-CM

## 2023-10-13 DIAGNOSIS — E78.2 MIXED HYPERLIPIDEMIA: ICD-10-CM

## 2023-10-13 RX ORDER — TRAZODONE HYDROCHLORIDE 100 MG/1
50 TABLET ORAL
Qty: 90 TABLET | Refills: 0 | Status: SHIPPED | OUTPATIENT
Start: 2023-10-13

## 2023-10-13 RX ORDER — ATORVASTATIN CALCIUM 10 MG/1
10 TABLET, FILM COATED ORAL
Qty: 90 TABLET | Refills: 0 | Status: SHIPPED | OUTPATIENT
Start: 2023-10-13

## 2023-10-13 RX ORDER — LOSARTAN POTASSIUM 50 MG/1
50 TABLET ORAL DAILY
Qty: 90 TABLET | Refills: 0 | Status: SHIPPED | OUTPATIENT
Start: 2023-10-13 | End: 2024-10-12

## 2023-10-13 NOTE — TELEPHONE ENCOUNTER
Caller: que    Doctor: Kareen Miller    Reason for call: 80% improvement     Call back#: [phone]

## 2023-10-23 ENCOUNTER — PROCEDURE VISIT (OUTPATIENT)
Age: 63
End: 2023-10-23
Payer: COMMERCIAL

## 2023-10-23 VITALS
DIASTOLIC BLOOD PRESSURE: 83 MMHG | HEART RATE: 93 BPM | SYSTOLIC BLOOD PRESSURE: 108 MMHG | WEIGHT: 157 LBS | HEIGHT: 65 IN | BODY MASS INDEX: 26.16 KG/M2

## 2023-10-23 DIAGNOSIS — M54.6 ACUTE BILATERAL THORACIC BACK PAIN: ICD-10-CM

## 2023-10-23 DIAGNOSIS — M54.2 NECK PAIN: ICD-10-CM

## 2023-10-23 DIAGNOSIS — M79.18 MYOFASCIAL PAIN: ICD-10-CM

## 2023-10-23 DIAGNOSIS — M54.41 ACUTE BILATERAL LOW BACK PAIN WITH RIGHT-SIDED SCIATICA: Primary | ICD-10-CM

## 2023-10-23 DIAGNOSIS — M54.12 CERVICAL RADICULOPATHY: ICD-10-CM

## 2023-10-23 PROCEDURE — 97140 MANUAL THERAPY 1/> REGIONS: CPT | Performed by: CHIROPRACTOR

## 2023-10-23 PROCEDURE — 98941 CHIROPRACT MANJ 3-4 REGIONS: CPT | Performed by: CHIROPRACTOR

## 2023-10-23 NOTE — PROGRESS NOTES
HPI:    Luma Duenas is in for treatment of neck, upper back pain, and mid back spasm. In addition she has LBP and right leg pain. Had lumbar DINORAH with some relief. Pain coming from her mid back however she feels  [employer]. VPAS  3 Neck  VPAS  3  Right leg    The following portions of the patient's history were reviewed and updated as appropriate: allergies, current medications, past family history, past medical history, past social history, past surgical history, and problem list.    Review of Systems    Physical Exam:  There is  segmental dysfunction T6-T7    Active triggers of myofascial involvement bilateral trapezii, rhomboid, levator scapula musculature joint dysfunction C5-C6    Pelvic obliquity is noted today with an elevated right versus left innominate internal rotation of the right innominate on sacrum paralumbar hypertonicity noted remaining exam stable joint dysfunction right SI L5-S1 motion unit. Assessment:   Diagnosis ICD-10-CM Associated Orders   1. Acute bilateral low back pain with right-sided sciatica  M54.41       2. Neck pain  M54.2       3. Myofascial pain  M79.18       4. Cervical radiculopathy  M54.12       5. Acute bilateral thoracic back pain  M54.6                 Treatment: 57042  Therapeutic myofascial release performed to the bilateral shoulder girdle. I used Graston technique. I used GT 2, GT 3, and GT 4 instruments. Scap thoracic mobilizations performed and well-tolerated. This was a 12-minute procedure    Manipulation provided to the C5 level via seated stairstepping technique well-tolerated. Manipulation right innominate, sacrum, L5 via Velasquez drop maneuver this did alleviate pelvic obliquity and was well-tolerated. Manipulation to T4, well tolerated. Discussion:  Continue flexion-based stretching daily icing see her back for follow-up.

## 2023-10-30 ENCOUNTER — PROCEDURE VISIT (OUTPATIENT)
Age: 63
End: 2023-10-30
Payer: COMMERCIAL

## 2023-10-30 ENCOUNTER — TELEPHONE (OUTPATIENT)
Age: 63
End: 2023-10-30

## 2023-10-30 VITALS
DIASTOLIC BLOOD PRESSURE: 82 MMHG | BODY MASS INDEX: 26.16 KG/M2 | SYSTOLIC BLOOD PRESSURE: 121 MMHG | WEIGHT: 157 LBS | HEIGHT: 65 IN

## 2023-10-30 DIAGNOSIS — M79.18 MYOFASCIAL PAIN: ICD-10-CM

## 2023-10-30 DIAGNOSIS — M54.2 NECK PAIN: Primary | ICD-10-CM

## 2023-10-30 DIAGNOSIS — G89.29 CHRONIC BILATERAL LOW BACK PAIN WITH LEFT-SIDED SCIATICA: ICD-10-CM

## 2023-10-30 DIAGNOSIS — M54.12 CERVICAL RADICULOPATHY: ICD-10-CM

## 2023-10-30 DIAGNOSIS — M54.6 ACUTE BILATERAL THORACIC BACK PAIN: ICD-10-CM

## 2023-10-30 DIAGNOSIS — M54.42 CHRONIC BILATERAL LOW BACK PAIN WITH LEFT-SIDED SCIATICA: ICD-10-CM

## 2023-10-30 DIAGNOSIS — Z00.00 WELL ADULT EXAM: Primary | ICD-10-CM

## 2023-10-30 PROCEDURE — 97140 MANUAL THERAPY 1/> REGIONS: CPT | Performed by: CHIROPRACTOR

## 2023-10-30 PROCEDURE — 98940 CHIROPRACT MANJ 1-2 REGIONS: CPT | Performed by: CHIROPRACTOR

## 2023-10-30 NOTE — PROGRESS NOTES
HPI:    Jana Hess is in for treatment of neck, upper back pain, and mid back spasm. VPAS  4 Neck  VPAS  4  Right leg    The following portions of the patient's history were reviewed and updated as appropriate: allergies, current medications, past family history, past medical history, past social history, past surgical history, and problem list.    Review of Systems    Physical Exam:  There is  segmental dysfunction T6-T7    Active triggers of myofascial involvement bilateral trapezii, rhomboid, levator scapula musculature joint dysfunction C5-C6    Pelvic obliquity is noted today with an elevated right versus left innominate internal rotation of the right innominate on sacrum paralumbar hypertonicity noted remaining exam stable joint dysfunction right SI L5-S1 motion unit. Assessment:   Diagnosis ICD-10-CM Associated Orders   1. Neck pain  M54.2       2. Myofascial pain  M79.18       3. Acute bilateral thoracic back pain  M54.6       4. Cervical radiculopathy  M54.12       5. Chronic bilateral low back pain with left-sided sciatica  M54.42     G89.29                 Treatment: 42904  Therapeutic myofascial release performed to the bilateral shoulder girdle. I used Graston technique. I used GT 2, GT 3, and GT 4 instruments. Scap thoracic mobilizations performed and well-tolerated. This was a 12-minute procedure    Manipulation provided to the C5 level via seated stairstepping technique well-tolerated. Manipulation right innominate, sacrum, L5 via Velasquez drop maneuver this did alleviate pelvic obliquity and was well-tolerated. Manipulation to T4, well tolerated. Discussion:  Continue flexion-based stretching daily icing see her back for follow-up.

## 2023-11-02 ENCOUNTER — OFFICE VISIT (OUTPATIENT)
Age: 63
End: 2023-11-02
Payer: COMMERCIAL

## 2023-11-02 VITALS
RESPIRATION RATE: 16 BRPM | SYSTOLIC BLOOD PRESSURE: 115 MMHG | BODY MASS INDEX: 25.97 KG/M2 | HEART RATE: 78 BPM | OXYGEN SATURATION: 98 % | HEIGHT: 65 IN | WEIGHT: 155.9 LBS | DIASTOLIC BLOOD PRESSURE: 80 MMHG

## 2023-11-02 DIAGNOSIS — F41.9 ANXIETY: ICD-10-CM

## 2023-11-02 DIAGNOSIS — M85.80 OSTEOPENIA, UNSPECIFIED LOCATION: ICD-10-CM

## 2023-11-02 DIAGNOSIS — E78.2 MIXED HYPERLIPIDEMIA: ICD-10-CM

## 2023-11-02 DIAGNOSIS — I67.1 CEREBRAL ANEURYSM, NONRUPTURED: ICD-10-CM

## 2023-11-02 DIAGNOSIS — K64.4 EXTERNAL HEMORRHOIDS: ICD-10-CM

## 2023-11-02 DIAGNOSIS — Z23 ENCOUNTER FOR IMMUNIZATION: ICD-10-CM

## 2023-11-02 DIAGNOSIS — E55.9 VITAMIN D INSUFFICIENCY: ICD-10-CM

## 2023-11-02 DIAGNOSIS — R51.9 NONINTRACTABLE HEADACHE, UNSPECIFIED CHRONICITY PATTERN, UNSPECIFIED HEADACHE TYPE: ICD-10-CM

## 2023-11-02 DIAGNOSIS — Z12.31 ENCOUNTER FOR SCREENING MAMMOGRAM FOR BREAST CANCER: ICD-10-CM

## 2023-11-02 DIAGNOSIS — G47.09 OTHER INSOMNIA: ICD-10-CM

## 2023-11-02 DIAGNOSIS — I10 HYPERTENSION, ESSENTIAL: ICD-10-CM

## 2023-11-02 DIAGNOSIS — Z00.00 WELL ADULT EXAM: ICD-10-CM

## 2023-11-02 DIAGNOSIS — Z00.00 ANNUAL PHYSICAL EXAM: Primary | ICD-10-CM

## 2023-11-02 PROCEDURE — 90471 IMMUNIZATION ADMIN: CPT

## 2023-11-02 PROCEDURE — 99396 PREV VISIT EST AGE 40-64: CPT

## 2023-11-02 PROCEDURE — 90686 IIV4 VACC NO PRSV 0.5 ML IM: CPT

## 2023-11-02 RX ORDER — BUTALBITAL, ACETAMINOPHEN AND CAFFEINE 300; 40; 50 MG/1; MG/1; MG/1
50 CAPSULE ORAL EVERY 6 HOURS PRN
Qty: 30 CAPSULE | Refills: 0 | Status: SHIPPED | OUTPATIENT
Start: 2023-11-02

## 2023-11-02 NOTE — PROGRESS NOTES
INTERNAL MEDICINE FOLLOW-UP VISIT  [facility] - [facility] INTERNAL MEDICINE [address]    NAME: Kirk Epstein  AGE: 58 y.o. SEX: female  : 1960     DATE: 2023     Assessment and Plan:   1. Annual physical exam  Eats a healthy diet and follows with weight management. She has lost 30 pounds with diet changes. She swims 3x a week and elliptical every other day. She drinks a good amount of water. She is not sleeping too well due to stress, uses 1/2 pill of trazodone. Discussed stressors in her life and healthy coping mechanisms. Recommended counseling services and she was agreeable. 2. Well adult exam  Labs ordered. 3. Hypertension, essential  Stable on losartan. /80 in office. Has recently lost 30 lbs. Limits salt and sugars in diet. Does not check her blood pressure regularly. 4. Mixed hyperlipidemia  Stable on atorvastatin. LDL was 88 in September. 5. Other insomnia  Uses a half a pill of trazodone to sleep sometimes. Has been having trouble sleeping due to stressors in life. 6. Encounter for screening mammogram for breast cancer  Due for mammogram in January. 7. 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  She is seeing both [facility] and [facility] neurology. She has upcoming appts with them in December and January. Ultimately, she would like to be monitored by our office. Discussed seeing what [facility] and [facility] recommends for monitoring parameters and letting us know at her next visit. She recently had a follow-up MRI and the cyst had not grown in size. 8. Anxiety  She is having some anxiety about being an empty marlena, her daughter getting engaged, and potentially retiring next year. We discussed healthy coping mechanisms like exercise, diet, getting back into yoga, vitamin D, and counseling services. She was agreeable to counseling services.     9. Vitamin D insufficiency  Has history of this in the past and currently has osteopenia, will recheck. Currently taking a vitamin D3 supplement. 10. Osteopenia, unspecified location  Discussed adding weightbearing exercises into daily exercise routine, increasing protein and calcium intake in diet. Continue taking vitamin D supplement. 11.  Non-intractable headache, unspecified chronicity pattern, unspecified headache type  Was previously diagnosed with Lyme and finished a 21-day course of doxycycline. Her headaches have been much better. 12. Encounter for immunization  Flu shot given. 13. External hemorrhoids  Recently saw Dr. Tarun Conn, no surgical intervention needed at this time. No follow-ups on file. Chief Complaint:     Chief Complaint   Patient presents with    Physical Exam      History of Present Illness:   Patient is a 54-year-old female that presents today for her annual physical.  She has no new complaints today. Health Maintenance:  Flu shot done today. Will get the rest of her labs done this week. Mammogram ordered for January. The following portions of the patient's history were reviewed and updated as appropriate: allergies, current medications, past family history, past medical history, past social history, past surgical history and problem list.     Review of Systems:     Review of Systems   Constitutional:  Negative for chills, fatigue and fever. HENT:  Negative for ear discharge, ear pain, postnasal drip, rhinorrhea, sinus pressure, sinus pain, sore throat, tinnitus and trouble swallowing. Eyes:  Negative for pain, discharge and itching. Respiratory:  Negative for cough, shortness of breath and wheezing. Cardiovascular:  Negative for chest pain, palpitations and leg swelling. Gastrointestinal:  Negative for abdominal pain, constipation, diarrhea, nausea and vomiting. Endocrine: Negative for polydipsia, polyphagia and polyuria.    Genitourinary:  Negative for difficulty urinating, frequency, hematuria and urgency. Musculoskeletal:  Positive for back pain. Negative for arthralgias, joint swelling and myalgias. Skin:  Negative for color change. Allergic/Immunologic: Positive for environmental allergies. Neurological:  Negative for dizziness, weakness, light-headedness, numbness and headaches. Hematological:  Negative for adenopathy. Psychiatric/Behavioral:  Positive for sleep disturbance. Negative for decreased concentration. The patient is nervous/anxious.          Past Medical History:     Past Medical History:   Diagnosis Date    Anxiety 07/29/2022    Cervical radiculopathy 11/01/2018    Cervical spondylosis without myelopathy 11/01/2018    Chronic headaches     Diverticulosis 03/14/2023    History of colon polyps 07/29/2022    History of heart disease     History of Helicobacter pylori infection     Hypertension, essential 10/16/2019    Internal hemorrhoids 03/14/2023    Mitral valve prolapse     Mixed hyperlipidemia 07/11/2018    Non-seasonal allergic rhinitis     Osteopenia of multiple sites 07/24/2019    Other insomnia 07/29/2022    Overweight     Seasonal allergies     Spinal stenosis of lumbar region with neurogenic claudication 10/22/2021    Vitamin D deficiency 12/08/2020        Current Medications:     Current Outpatient Medications:     atorvastatin (LIPITOR) 10 mg tablet, Take 1 tablet (10 mg total) by mouth daily at bedtime, Disp: 90 tablet, Rfl: 0    b complex vitamins capsule, Take 1 capsule by mouth daily, Disp: , Rfl:     Butalbital-APAP-Caffeine (Fioricet) -40 MG CAPS, Take 50 mg by mouth every 6 (six) hours as needed (headache), Disp: 30 capsule, Rfl: 0    calcium gluconate 500 mg tablet, Take 1,000 mg by mouth daily, Disp: , Rfl:     cholecalciferol (VITAMIN D3) 1,000 units tablet, Take 1,000 Units by mouth daily, Disp: , Rfl:     fluticasone (FLONASE) 50 mcg/act nasal spray, 2 sprays into each nostril daily OTC, Disp: , Rfl:     losartan (COZAAR) 50 mg tablet, Take 1 tablet (50 mg total) by mouth daily, Disp: 90 tablet, Rfl: 0    methocarbamol (ROBAXIN) 500 mg tablet, Take 1 tablet (500 mg total) by mouth 3 (three) times a day as needed for muscle spasms, Disp: 90 tablet, Rfl: 3    montelukast (SINGULAIR) 10 mg tablet, Take 10 mg by mouth daily at bedtime, Disp: , Rfl:     Multiple Vitamin (multivitamin) tablet, Take 1 tablet by mouth daily, Disp: , Rfl:     Omega-3 Fatty Acids (fish oil) 1,000 mg, Take 1,000 mg by mouth daily, Disp: , Rfl:     Probiotic Product (PROBIOTIC-10 PO), Take 1 tablet by mouth in the morning, Disp: , Rfl:     rimegepant sulfate (NURTEC) 75 mg TBDP, Take 1 tablet (75 mg total) by mouth daily as needed (at the onset of migraines), Disp: 8 tablet, Rfl: 3    traZODone (DESYREL) 100 mg tablet, Take 0.5 tablets (50 mg total) by mouth daily at bedtime as needed for sleep, Disp: 90 tablet, Rfl: 0    zinc gluconate 50 mg tablet, Take 50 mg by mouth daily, Disp: , Rfl:     acetaminophen (TYLENOL) 500 mg tablet, Take 1 tablet (500 mg total) by mouth every 6 (six) hours as needed for mild pain (Patient not taking: Reported on 11/2/2023), Disp: 60 tablet, Rfl: 0     Allergies:   No Known Allergies     Physical Exam:     /80 (BP Location: Left arm, Patient Position: Sitting, Cuff Size: Adult)   Pulse 78   Resp 16   Ht 5' 5" (1.651 m)   Wt 70.7 kg (155 lb 14.4 oz)   LMP 01/01/2015 (Within Years)   SpO2 98%   BMI 25.94 kg/m²     Physical Exam  Vitals and nursing note reviewed. Constitutional:       General: She is awake. She is not in acute distress. Appearance: Normal appearance. She is well-developed, well-groomed and normal weight. HENT:      Head: Normocephalic and atraumatic. Right Ear: Hearing and external ear normal.      Left Ear: Hearing and external ear normal.      Nose: Nose normal.      Mouth/Throat:      Lips: Pink.       Mouth: Mucous membranes are moist.   Eyes:      General: Lids are normal. Vision grossly intact. Gaze aligned appropriately. Conjunctiva/sclera: Conjunctivae normal.   Neck:      Vascular: No carotid bruit. Trachea: Trachea and phonation normal.   Cardiovascular:      Rate and Rhythm: Normal rate and regular rhythm. Heart sounds: Normal heart sounds, S1 normal and S2 normal. No murmur heard. No friction rub. No gallop. Pulmonary:      Effort: Pulmonary effort is normal. No respiratory distress. Breath sounds: Normal breath sounds and air entry. No decreased breath sounds, wheezing, rhonchi or rales. Abdominal:      General: Abdomen is flat. Bowel sounds are normal.      Palpations: Abdomen is soft. Tenderness: There is no abdominal tenderness. Musculoskeletal:         General: No swelling. Cervical back: Neck supple. Right lower leg: No edema. Left lower leg: No edema. Skin:     General: Skin is warm. Capillary Refill: Capillary refill takes less than 2 seconds. Neurological:      Mental Status: She is alert. Psychiatric:         Attention and Perception: Attention and perception normal.         Mood and Affect: Mood and affect normal.         Speech: Speech normal.         Behavior: Behavior normal. Behavior is cooperative. Thought Content: Thought content normal.         Cognition and Memory: Cognition and memory normal.         Judgment: Judgment normal.           Data:     Laboratory Results: I have personally reviewed the pertinent laboratory results/reports   Radiology/Other Diagnostic Testing Results: I have personally reviewed pertinent reports.       Corinne Dacosta Whitewood, PA-C  [facility] INTERNAL MEDICINE [facility]

## 2023-11-02 NOTE — PATIENT INSTRUCTIONS
Wellness Visit for Adults   AMBULATORY CARE:   A wellness visit  is when you see your healthcare provider to get screened for health problems. Your healthcare provider will also give you advice on how to stay healthy. Write down your questions so you remember to ask them. Ask your healthcare provider how often you should have a wellness visit. What happens at a wellness visit:  Your healthcare provider will ask about your health, and your family history of health problems. This includes high blood pressure, heart disease, and cancer. He or she will ask if you have symptoms that concern you, if you smoke, and about your mood. You may also be asked about your intake of medicines, supplements, food, and alcohol. Any of the following may be done: Your weight  will be checked. Your height may also be checked so your body mass index (BMI) can be calculated. Your BMI shows if you are at a healthy weight. Your blood pressure  and heart rate will be checked. Your temperature may also be checked. Blood and urine tests  may be done. Blood tests may be done to check your cholesterol levels. Abnormal cholesterol levels increase your risk for heart disease and stroke. You may also need a blood or urine test to check for diabetes if you are at increased risk. Urine tests may be done to look for signs of an infection or kidney disease. A physical exam  includes checking your heartbeat and lungs with a stethoscope. Your healthcare provider may also check your skin to look for sun damage. Screening tests  may be recommended. A screening test is done to check for diseases that may not cause symptoms. The screening tests you may need depend on your age, gender, family history, and lifestyle habits. For example, colorectal screening may be recommended if you are 48years old or older. Screening tests you need if you are a woman:   A Pap smear  is used to screen for cervical cancer.  Pap smears are usually done every 3 to 5 years depending on your age. You may need them more often if you have had abnormal Pap smear test results in the past. Ask your healthcare provider how often you should have a Pap smear. A mammogram  is an x-ray of your breasts to screen for breast cancer. Experts recommend mammograms every 2 years starting at age 48 years. You may need a mammogram at age 52 years or younger if you have an increased risk for breast cancer. Talk to your healthcare provider about when you should start having mammograms and how often you need them. Vaccines you may need:   Get an influenza vaccine  every year. The influenza vaccine protects you from the flu. Several types of viruses cause the flu. The viruses change over time, so new vaccines are made each year. Get a tetanus-diphtheria (Td) booster vaccine  every 10 years. This vaccine protects you against tetanus and diphtheria. Tetanus is a severe infection that may cause painful muscle spasms and lockjaw. Diphtheria is a severe bacterial infection that causes a thick covering in the back of your mouth and throat. Get a human papillomavirus (HPV) vaccine  if you are female and aged 23 to 32 or male 23 to 24 and never received it. This vaccine protects you from HPV infection. HPV is the most common infection spread by sexual contact. HPV may also cause vaginal, penile, and anal cancers. Get a pneumococcal vaccine  if you are aged 72 years or older. The pneumococcal vaccine is an injection given to protect you from pneumococcal disease. Pneumococcal disease is an infection caused by pneumococcal bacteria. The infection may cause pneumonia, meningitis, or an ear infection. Get a shingles vaccine  if you are 60 or older, even if you have had shingles before. The shingles vaccine is an injection to protect you from the varicella-zoster virus. This is the same virus that causes chickenpox.  Shingles is a painful rash that develops in people who had chickenpox or have been exposed to the virus. How to eat healthy:  My Plate is a model for planning healthy meals. It shows the types and amounts of foods that should go on your plate. Fruits and vegetables make up about half of your plate, and grains and protein make up the other half. A serving of dairy is included on the side of your plate. The amount of calories and serving sizes you need depends on your age, gender, weight, and height. Examples of healthy foods are listed below:  Eat a variety of vegetables  such as dark green, red, and orange vegetables. You can also include canned vegetables low in sodium (salt) and frozen vegetables without added butter or sauces. Eat a variety of fresh fruits , canned fruit in 100% juice, frozen fruit, and dried fruit. Include whole grains. At least half of the grains you eat should be whole grains. Examples include whole-wheat bread, wheat pasta, brown rice, and whole-grain cereals such as oatmeal.    Eat a variety of protein foods such as seafood (fish and shellfish), lean meat, and poultry without skin (turkey and chicken). Examples of lean meats include pork leg, shoulder, or tenderloin, and beef round, sirloin, tenderloin, and extra lean ground beef. Other protein foods include eggs and egg substitutes, beans, peas, soy products, nuts, and seeds. Choose low-fat dairy products such as skim or 1% milk or low-fat yogurt, cheese, and cottage cheese. Limit unhealthy fats  such as butter, hard margarine, and shortening. Exercise:  Exercise at least 30 minutes per day on most days of the week. Some examples of exercise include walking, biking, dancing, and swimming. You can also fit in more physical activity by taking the stairs instead of the elevator or parking farther away from stores. Include muscle strengthening activities 2 days each week. Regular exercise provides many health benefits.  It helps you manage your weight, and decreases your risk for type 2 diabetes, heart disease, stroke, and high blood pressure. Exercise can also help improve your mood. Ask your healthcare provider about the best exercise plan for you. General health and safety guidelines:   Do not smoke. Nicotine and other chemicals in cigarettes and cigars can cause lung damage. Ask your healthcare provider for information if you currently smoke and need help to quit. E-cigarettes or smokeless tobacco still contain nicotine. Talk to your healthcare provider before you use these products. Limit alcohol. A drink of alcohol is 12 ounces of beer, 5 ounces of wine, or 1½ ounces of liquor. Lose weight, if needed. Being overweight increases your risk of certain health conditions. These include heart disease, high blood pressure, type 2 diabetes, and certain types of cancer. Protect your skin. Do not sunbathe or use tanning beds. Use sunscreen with a SPF 15 or higher. Apply sunscreen at least 15 minutes before you go outside. Reapply sunscreen every 2 hours. Wear protective clothing, hats, and sunglasses when you are outside. Drive safely. Always wear your seatbelt. Make sure everyone in your car wears a seatbelt. A seatbelt can save your life if you are in an accident. Do not use your cell phone when you are driving. This could distract you and cause an accident. Pull over if you need to make a call or send a text message. Practice safe sex. Use latex condoms if are sexually active and have more than one partner. Your healthcare provider may recommend screening tests for sexually transmitted infections (STIs). Wear helmets, lifejackets, and protective gear. Always wear a helmet when you ride a bike or motorcycle, go skiing, or play sports that could cause a head injury. Wear protective equipment when you play sports. Wear a lifejacket when you are on a boat or doing water sports.     © Copyright [employer] 2023 Information is for End User's use only and may not be sold, redistributed or otherwise used for commercial purposes. The above information is an  only. It is not intended as medical advice for individual conditions or treatments. Talk to your doctor, nurse or pharmacist before following any medical regimen to see if it is safe and effective for you. Wellness Visit for Adults   AMBULATORY CARE:   A wellness visit  is when you see your healthcare provider to get screened for health problems. Your healthcare provider will also give you advice on how to stay healthy. Write down your questions so you remember to ask them. Ask your healthcare provider how often you should have a wellness visit. What happens at a wellness visit:  Your healthcare provider will ask about your health, and your family history of health problems. This includes high blood pressure, heart disease, and cancer. He or she will ask if you have symptoms that concern you, if you smoke, and about your mood. You may also be asked about your intake of medicines, supplements, food, and alcohol. Any of the following may be done: Your weight  will be checked. Your height may also be checked so your body mass index (BMI) can be calculated. Your BMI shows if you are at a healthy weight. Your blood pressure  and heart rate will be checked. Your temperature may also be checked. Blood and urine tests  may be done. Blood tests may be done to check your cholesterol levels. Abnormal cholesterol levels increase your risk for heart disease and stroke. You may also need a blood or urine test to check for diabetes if you are at increased risk. Urine tests may be done to look for signs of an infection or kidney disease. A physical exam  includes checking your heartbeat and lungs with a stethoscope. Your healthcare provider may also check your skin to look for sun damage. Screening tests  may be recommended. A screening test is done to check for diseases that may not cause symptoms.  The screening tests you may need depend on your age, gender, family history, and lifestyle habits. For example, colorectal screening may be recommended if you are 48years old or older. Screening tests you need if you are a woman:   A Pap smear  is used to screen for cervical cancer. Pap smears are usually done every 3 to 5 years depending on your age. You may need them more often if you have had abnormal Pap smear test results in the past. Ask your healthcare provider how often you should have a Pap smear. A mammogram  is an x-ray of your breasts to screen for breast cancer. Experts recommend mammograms every 2 years starting at age 48 years. You may need a mammogram at age 52 years or younger if you have an increased risk for breast cancer. Talk to your healthcare provider about when you should start having mammograms and how often you need them. Vaccines you may need:   Get an influenza vaccine  every year. The influenza vaccine protects you from the flu. Several types of viruses cause the flu. The viruses change over time, so new vaccines are made each year. Get a tetanus-diphtheria (Td) booster vaccine  every 10 years. This vaccine protects you against tetanus and diphtheria. Tetanus is a severe infection that may cause painful muscle spasms and lockjaw. Diphtheria is a severe bacterial infection that causes a thick covering in the back of your mouth and throat. Get a human papillomavirus (HPV) vaccine  if you are female and aged 23 to 32 or male 23 to 24 and never received it. This vaccine protects you from HPV infection. HPV is the most common infection spread by sexual contact. HPV may also cause vaginal, penile, and anal cancers. Get a pneumococcal vaccine  if you are aged 72 years or older. The pneumococcal vaccine is an injection given to protect you from pneumococcal disease. Pneumococcal disease is an infection caused by pneumococcal bacteria. The infection may cause pneumonia, meningitis, or an ear infection.     Get a shingles vaccine  if you are 60 or older, even if you have had shingles before. The shingles vaccine is an injection to protect you from the varicella-zoster virus. This is the same virus that causes chickenpox. Shingles is a painful rash that develops in people who had chickenpox or have been exposed to the virus. How to eat healthy:  My Plate is a model for planning healthy meals. It shows the types and amounts of foods that should go on your plate. Fruits and vegetables make up about half of your plate, and grains and protein make up the other half. A serving of dairy is included on the side of your plate. The amount of calories and serving sizes you need depends on your age, gender, weight, and height. Examples of healthy foods are listed below:  Eat a variety of vegetables  such as dark green, red, and orange vegetables. You can also include canned vegetables low in sodium (salt) and frozen vegetables without added butter or sauces. Eat a variety of fresh fruits , canned fruit in 100% juice, frozen fruit, and dried fruit. Include whole grains. At least half of the grains you eat should be whole grains. Examples include whole-wheat bread, wheat pasta, brown rice, and whole-grain cereals such as oatmeal.    Eat a variety of protein foods such as seafood (fish and shellfish), lean meat, and poultry without skin (turkey and chicken). Examples of lean meats include pork leg, shoulder, or tenderloin, and beef round, sirloin, tenderloin, and extra lean ground beef. Other protein foods include eggs and egg substitutes, beans, peas, soy products, nuts, and seeds. Choose low-fat dairy products such as skim or 1% milk or low-fat yogurt, cheese, and cottage cheese. Limit unhealthy fats  such as butter, hard margarine, and shortening. Exercise:  Exercise at least 30 minutes per day on most days of the week. Some examples of exercise include walking, biking, dancing, and swimming.  You can also fit in more physical activity by taking the stairs instead of the elevator or parking farther away from stores. Include muscle strengthening activities 2 days each week. Regular exercise provides many health benefits. It helps you manage your weight, and decreases your risk for type 2 diabetes, heart disease, stroke, and high blood pressure. Exercise can also help improve your mood. Ask your healthcare provider about the best exercise plan for you. General health and safety guidelines:   Do not smoke. Nicotine and other chemicals in cigarettes and cigars can cause lung damage. Ask your healthcare provider for information if you currently smoke and need help to quit. E-cigarettes or smokeless tobacco still contain nicotine. Talk to your healthcare provider before you use these products. Limit alcohol. A drink of alcohol is 12 ounces of beer, 5 ounces of wine, or 1½ ounces of liquor. Lose weight, if needed. Being overweight increases your risk of certain health conditions. These include heart disease, high blood pressure, type 2 diabetes, and certain types of cancer. Protect your skin. Do not sunbathe or use tanning beds. Use sunscreen with a SPF 15 or higher. Apply sunscreen at least 15 minutes before you go outside. Reapply sunscreen every 2 hours. Wear protective clothing, hats, and sunglasses when you are outside. Drive safely. Always wear your seatbelt. Make sure everyone in your car wears a seatbelt. A seatbelt can save your life if you are in an accident. Do not use your cell phone when you are driving. This could distract you and cause an accident. Pull over if you need to make a call or send a text message. Practice safe sex. Use latex condoms if are sexually active and have more than one partner. Your healthcare provider may recommend screening tests for sexually transmitted infections (STIs). Wear helmets, lifejackets, and protective gear.   Always wear a helmet when you ride a bike or motorcycle, go skiing, or play sports that could cause a head injury. Wear protective equipment when you play sports. Wear a lifejacket when you are on a boat or doing water sports. © Copyright [facility] 2023 Information is for End User's use only and may not be sold, redistributed or otherwise used for commercial purposes. The above information is an  only. It is not intended as medical advice for individual conditions or treatments. Talk to your doctor, nurse or pharmacist before following any medical regimen to see if it is safe and effective for you. Wellness Visit for Adults   AMBULATORY CARE:   A wellness visit  is when you see your healthcare provider to get screened for health problems. Your healthcare provider will also give you advice on how to stay healthy. Write down your questions so you remember to ask them. Ask your healthcare provider how often you should have a wellness visit. What happens at a wellness visit:  Your healthcare provider will ask about your health, and your family history of health problems. This includes high blood pressure, heart disease, and cancer. He or she will ask if you have symptoms that concern you, if you smoke, and about your mood. You may also be asked about your intake of medicines, supplements, food, and alcohol. Any of the following may be done: Your weight  will be checked. Your height may also be checked so your body mass index (BMI) can be calculated. Your BMI shows if you are at a healthy weight. Your blood pressure  and heart rate will be checked. Your temperature may also be checked. Blood and urine tests  may be done. Blood tests may be done to check your cholesterol levels. Abnormal cholesterol levels increase your risk for heart disease and stroke. You may also need a blood or urine test to check for diabetes if you are at increased risk. Urine tests may be done to look for signs of an infection or kidney disease.     A physical exam  includes checking your heartbeat and lungs with a stethoscope. Your healthcare provider may also check your skin to look for sun damage. Screening tests  may be recommended. A screening test is done to check for diseases that may not cause symptoms. The screening tests you may need depend on your age, gender, family history, and lifestyle habits. For example, colorectal screening may be recommended if you are 48years old or older. Screening tests you need if you are a woman:   A Pap smear  is used to screen for cervical cancer. Pap smears are usually done every 3 to 5 years depending on your age. You may need them more often if you have had abnormal Pap smear test results in the past. Ask your healthcare provider how often you should have a Pap smear. A mammogram  is an x-ray of your breasts to screen for breast cancer. Experts recommend mammograms every 2 years starting at age 48 years. You may need a mammogram at age 52 years or younger if you have an increased risk for breast cancer. Talk to your healthcare provider about when you should start having mammograms and how often you need them. Vaccines you may need:   Get an influenza vaccine  every year. The influenza vaccine protects you from the flu. Several types of viruses cause the flu. The viruses change over time, so new vaccines are made each year. Get a tetanus-diphtheria (Td) booster vaccine  every 10 years. This vaccine protects you against tetanus and diphtheria. Tetanus is a severe infection that may cause painful muscle spasms and lockjaw. Diphtheria is a severe bacterial infection that causes a thick covering in the back of your mouth and throat. Get a human papillomavirus (HPV) vaccine  if you are female and aged 23 to 32 or male 23 to 24 and never received it. This vaccine protects you from HPV infection. HPV is the most common infection spread by sexual contact. HPV may also cause vaginal, penile, and anal cancers.     Get a pneumococcal vaccine  if you are aged 72 years or older. The pneumococcal vaccine is an injection given to protect you from pneumococcal disease. Pneumococcal disease is an infection caused by pneumococcal bacteria. The infection may cause pneumonia, meningitis, or an ear infection. Get a shingles vaccine  if you are 60 or older, even if you have had shingles before. The shingles vaccine is an injection to protect you from the varicella-zoster virus. This is the same virus that causes chickenpox. Shingles is a painful rash that develops in people who had chickenpox or have been exposed to the virus. How to eat healthy:  My Plate is a model for planning healthy meals. It shows the types and amounts of foods that should go on your plate. Fruits and vegetables make up about half of your plate, and grains and protein make up the other half. A serving of dairy is included on the side of your plate. The amount of calories and serving sizes you need depends on your age, gender, weight, and height. Examples of healthy foods are listed below:  Eat a variety of vegetables  such as dark green, red, and orange vegetables. You can also include canned vegetables low in sodium (salt) and frozen vegetables without added butter or sauces. Eat a variety of fresh fruits , canned fruit in 100% juice, frozen fruit, and dried fruit. Include whole grains. At least half of the grains you eat should be whole grains. Examples include whole-wheat bread, wheat pasta, brown rice, and whole-grain cereals such as oatmeal.    Eat a variety of protein foods such as seafood (fish and shellfish), lean meat, and poultry without skin (turkey and chicken). Examples of lean meats include pork leg, shoulder, or tenderloin, and beef round, sirloin, tenderloin, and extra lean ground beef. Other protein foods include eggs and egg substitutes, beans, peas, soy products, nuts, and seeds.     Choose low-fat dairy products such as skim or 1% milk or low-fat yogurt, cheese, and cottage cheese. Limit unhealthy fats  such as butter, hard margarine, and shortening. Exercise:  Exercise at least 30 minutes per day on most days of the week. Some examples of exercise include walking, biking, dancing, and swimming. You can also fit in more physical activity by taking the stairs instead of the elevator or parking farther away from stores. Include muscle strengthening activities 2 days each week. Regular exercise provides many health benefits. It helps you manage your weight, and decreases your risk for type 2 diabetes, heart disease, stroke, and high blood pressure. Exercise can also help improve your mood. Ask your healthcare provider about the best exercise plan for you. General health and safety guidelines:   Do not smoke. Nicotine and other chemicals in cigarettes and cigars can cause lung damage. Ask your healthcare provider for information if you currently smoke and need help to quit. E-cigarettes or smokeless tobacco still contain nicotine. Talk to your healthcare provider before you use these products. Limit alcohol. A drink of alcohol is 12 ounces of beer, 5 ounces of wine, or 1½ ounces of liquor. Lose weight, if needed. Being overweight increases your risk of certain health conditions. These include heart disease, high blood pressure, type 2 diabetes, and certain types of cancer. Protect your skin. Do not sunbathe or use tanning beds. Use sunscreen with a SPF 15 or higher. Apply sunscreen at least 15 minutes before you go outside. Reapply sunscreen every 2 hours. Wear protective clothing, hats, and sunglasses when you are outside. Drive safely. Always wear your seatbelt. Make sure everyone in your car wears a seatbelt. A seatbelt can save your life if you are in an accident. Do not use your cell phone when you are driving. This could distract you and cause an accident.  Pull over if you need to make a call or send a text message. Practice safe sex. Use latex condoms if are sexually active and have more than one partner. Your healthcare provider may recommend screening tests for sexually transmitted infections (STIs). Wear helmets, lifejackets, and protective gear. Always wear a helmet when you ride a bike or motorcycle, go skiing, or play sports that could cause a head injury. Wear protective equipment when you play sports. Wear a lifejacket when you are on a boat or doing water sports. © Copyright [employer] 2023 Information is for End User's use only and may not be sold, redistributed or otherwise used for commercial purposes. The above information is an  only. It is not intended as medical advice for individual conditions or treatments. Talk to your doctor, nurse or pharmacist before following any medical regimen to see if it is safe and effective for you. Wellness Visit for Adults   AMBULATORY CARE:   A wellness visit  is when you see your healthcare provider to get screened for health problems. Your healthcare provider will also give you advice on how to stay healthy. Write down your questions so you remember to ask them. Ask your healthcare provider how often you should have a wellness visit. What happens at a wellness visit:  Your healthcare provider will ask about your health, and your family history of health problems. This includes high blood pressure, heart disease, and cancer. He or she will ask if you have symptoms that concern you, if you smoke, and about your mood. You may also be asked about your intake of medicines, supplements, food, and alcohol. Any of the following may be done: Your weight  will be checked. Your height may also be checked so your body mass index (BMI) can be calculated. Your BMI shows if you are at a healthy weight. Your blood pressure  and heart rate will be checked. Your temperature may also be checked. Blood and urine tests  may be done.  Blood tests may be done to check your cholesterol levels. Abnormal cholesterol levels increase your risk for heart disease and stroke. You may also need a blood or urine test to check for diabetes if you are at increased risk. Urine tests may be done to look for signs of an infection or kidney disease. A physical exam  includes checking your heartbeat and lungs with a stethoscope. Your healthcare provider may also check your skin to look for sun damage. Screening tests  may be recommended. A screening test is done to check for diseases that may not cause symptoms. The screening tests you may need depend on your age, gender, family history, and lifestyle habits. For example, colorectal screening may be recommended if you are 48years old or older. Screening tests you need if you are a woman:   A Pap smear  is used to screen for cervical cancer. Pap smears are usually done every 3 to 5 years depending on your age. You may need them more often if you have had abnormal Pap smear test results in the past. Ask your healthcare provider how often you should have a Pap smear. A mammogram  is an x-ray of your breasts to screen for breast cancer. Experts recommend mammograms every 2 years starting at age 48 years. You may need a mammogram at age 52 years or younger if you have an increased risk for breast cancer. Talk to your healthcare provider about when you should start having mammograms and how often you need them. Vaccines you may need:   Get an influenza vaccine  every year. The influenza vaccine protects you from the flu. Several types of viruses cause the flu. The viruses change over time, so new vaccines are made each year. Get a tetanus-diphtheria (Td) booster vaccine  every 10 years. This vaccine protects you against tetanus and diphtheria. Tetanus is a severe infection that may cause painful muscle spasms and lockjaw.  Diphtheria is a severe bacterial infection that causes a thick covering in the back of your mouth and throat. Get a human papillomavirus (HPV) vaccine  if you are female and aged 23 to 32 or male 23 to 24 and never received it. This vaccine protects you from HPV infection. HPV is the most common infection spread by sexual contact. HPV may also cause vaginal, penile, and anal cancers. Get a pneumococcal vaccine  if you are aged 72 years or older. The pneumococcal vaccine is an injection given to protect you from pneumococcal disease. Pneumococcal disease is an infection caused by pneumococcal bacteria. The infection may cause pneumonia, meningitis, or an ear infection. Get a shingles vaccine  if you are 60 or older, even if you have had shingles before. The shingles vaccine is an injection to protect you from the varicella-zoster virus. This is the same virus that causes chickenpox. Shingles is a painful rash that develops in people who had chickenpox or have been exposed to the virus. How to eat healthy:  My Plate is a model for planning healthy meals. It shows the types and amounts of foods that should go on your plate. Fruits and vegetables make up about half of your plate, and grains and protein make up the other half. A serving of dairy is included on the side of your plate. The amount of calories and serving sizes you need depends on your age, gender, weight, and height. Examples of healthy foods are listed below:  Eat a variety of vegetables  such as dark green, red, and orange vegetables. You can also include canned vegetables low in sodium (salt) and frozen vegetables without added butter or sauces. Eat a variety of fresh fruits , canned fruit in 100% juice, frozen fruit, and dried fruit. Include whole grains. At least half of the grains you eat should be whole grains.  Examples include whole-wheat bread, wheat pasta, brown rice, and whole-grain cereals such as oatmeal.    Eat a variety of protein foods such as seafood (fish and shellfish), lean meat, and poultry without skin (turkey and chicken). Examples of lean meats include pork leg, shoulder, or tenderloin, and beef round, sirloin, tenderloin, and extra lean ground beef. Other protein foods include eggs and egg substitutes, beans, peas, soy products, nuts, and seeds. Choose low-fat dairy products such as skim or 1% milk or low-fat yogurt, cheese, and cottage cheese. Limit unhealthy fats  such as butter, hard margarine, and shortening. Exercise:  Exercise at least 30 minutes per day on most days of the week. Some examples of exercise include walking, biking, dancing, and swimming. You can also fit in more physical activity by taking the stairs instead of the elevator or parking farther away from stores. Include muscle strengthening activities 2 days each week. Regular exercise provides many health benefits. It helps you manage your weight, and decreases your risk for type 2 diabetes, heart disease, stroke, and high blood pressure. Exercise can also help improve your mood. Ask your healthcare provider about the best exercise plan for you. General health and safety guidelines:   Do not smoke. Nicotine and other chemicals in cigarettes and cigars can cause lung damage. Ask your healthcare provider for information if you currently smoke and need help to quit. E-cigarettes or smokeless tobacco still contain nicotine. Talk to your healthcare provider before you use these products. Limit alcohol. A drink of alcohol is 12 ounces of beer, 5 ounces of wine, or 1½ ounces of liquor. Lose weight, if needed. Being overweight increases your risk of certain health conditions. These include heart disease, high blood pressure, type 2 diabetes, and certain types of cancer. Protect your skin. Do not sunbathe or use tanning beds. Use sunscreen with a SPF 15 or higher. Apply sunscreen at least 15 minutes before you go outside. Reapply sunscreen every 2 hours.  Wear protective clothing, hats, and sunglasses when you are outside. Drive safely. Always wear your seatbelt. Make sure everyone in your car wears a seatbelt. A seatbelt can save your life if you are in an accident. Do not use your cell phone when you are driving. This could distract you and cause an accident. Pull over if you need to make a call or send a text message. Practice safe sex. Use latex condoms if are sexually active and have more than one partner. Your healthcare provider may recommend screening tests for sexually transmitted infections (STIs). Wear helmets, lifejackets, and protective gear. Always wear a helmet when you ride a bike or motorcycle, go skiing, or play sports that could cause a head injury. Wear protective equipment when you play sports. Wear a lifejacket when you are on a boat or doing water sports. © Copyright [employer] 2023 Information is for End User's use only and may not be sold, redistributed or otherwise used for commercial purposes. The above information is an  only. It is not intended as medical advice for individual conditions or treatments. Talk to your doctor, nurse or pharmacist before following any medical regimen to see if it is safe and effective for you.

## 2023-11-02 NOTE — PROGRESS NOTES
[address] [facility]  INTERNAL MEDICINE [address]    NAME: Chirag Holland  AGE: 58 y.o. SEX: female  : 1960     DATE: 2023     Assessment and Plan:     Problem List Items Addressed This Visit     Mixed hyperlipidemia    Hypertension, essential    Other insomnia   Other Visit Diagnoses     Annual physical exam    -  Primary    Well adult exam        Encounter for screening mammogram for breast cancer              Immunizations and preventive care screenings were discussed with patient today. Appropriate education was printed on patient's after visit summary. Counseling:  {Annual Physical; Counselin}         No follow-ups on file. Chief Complaint:     No chief complaint on file. History of Present Illness:     Adult Annual Physical   Patient here for a comprehensive physical exam. The patient reports {problems:04626}. Diet and Physical Activity  Diet/Nutrition: {annual physical; diet:20702448}. Exercise: {annual physical; exercise:68268787}. Depression Screening  PHQ-2/9 Depression Screening         General Health  Sleep: {annual physical; sleep:2102}. Hearing: {annual physical; hearin}. Vision: {annual physical; vision:}. Dental: {annual physical; dental:09902347}. /GYN Health  Patient is: {Menopause:01426}  Last menstrual period: ***  Contraceptive method: {contraceptive options:}. Advanced Care Planning  Do you have an advanced directive? {YES/NO:}  Do you have a durable medical power of ?  {YES/NO:}     Review of Systems:     Review of Systems   Past Medical History:     Past Medical History:   Diagnosis Date   • Anxiety 2022   • Cervical radiculopathy 2018   • Cervical spondylosis without myelopathy 2018   • Chronic headaches    • Diverticulosis 2023   • History of colon polyps 2022   • History of heart disease    • History of Helicobacter pylori infection    • Hypertension, essential 10/16/2019   • Internal hemorrhoids 03/14/2023   • Mitral valve prolapse    • Mixed hyperlipidemia 07/11/2018   • Non-seasonal allergic rhinitis    • Osteopenia of multiple sites 07/24/2019   • Other insomnia 07/29/2022   • Overweight    • Seasonal allergies    • Spinal stenosis of lumbar region with neurogenic claudication 10/22/2021   • Vitamin D deficiency 12/08/2020      Past Surgical History:     Past Surgical History:   Procedure Laterality Date   • BREAST EXCISIONAL BIOPSY Left 1991   • BREAST EXCISIONAL BIOPSY Left     benign   • COLONOSCOPY  02/26/2018   • DILATION AND CURETTAGE OF UTERUS     • EGD  02/26/2018   • LAMINECTOMY AND MICRODISCECTOMY LUMBAR SPINE  2011      Social History:     Social History     Socioeconomic History   • Marital status: /Civil Union     Spouse name: Not on file   • Number of children: 4   • Years of education: Not on file   • Highest education level: Not on file   Occupational History   • Occupation: Director of Nursing for Acute Care     Employer: [email or web address]   Tobacco Use   • Smoking status: Never   • Smokeless tobacco: Never   Vaping Use   • Vaping Use: Never used   Substance and Sexual Activity   • Alcohol use:  Yes     Alcohol/week: 8.0 standard drinks of alcohol     Types: 8 Glasses of wine per week   • Drug use: Never   • Sexual activity: Yes     Partners: Male     Birth control/protection: Male Sterilization, Post-menopausal     Comment: Vasectomy   Other Topics Concern   • Not on file   Social History Narrative        Lives with  Fatimah Perez, Adult Son Georgia    [phone] [facility], 2 Daughters    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[address] [place] [address] Determinants of Health     Financial Resource Strain: Not on file   Food Insecurity: Not on file   Transportation Needs: Not on file   Physical Activity: Not on file   Stress: Not on file   Social Connections: Not on file   Intimate Partner Violence: Not on file   Housing Stability: Not on file      Family History:     Family History   Adopted: Yes   Problem Relation Age of Onset   • No Known Problems Mother         Estranged   • No Known Problems Father         Estranged   • No Known Problems Daughter    • No Known Problems Daughter    • No Known Problems Son    • Asperger's syndrome Son    • Autism spectrum disorder Son    • Bipolar disorder Son    • Anxiety disorder Son       Current Medications:     Current Outpatient Medications   Medication Sig Dispense Refill   • acetaminophen (TYLENOL) 500 mg tablet Take 1 tablet (500 mg total) by mouth every 6 (six) hours as needed for mild pain 60 tablet 0   • atorvastatin (LIPITOR) 10 mg tablet Take 1 tablet (10 mg total) by mouth daily at bedtime 90 tablet 0   • b complex vitamins capsule Take 1 capsule by mouth daily     • Butalbital-APAP-Caffeine (Fioricet) -40 MG CAPS Take 50 mg by mouth every 6 (six) hours as needed (headache) 30 capsule 0   • calcium gluconate 500 mg tablet Take 1,000 mg by mouth daily     • cholecalciferol (VITAMIN D3) 1,000 units tablet Take 1,000 Units by mouth daily     • fluticasone (FLONASE) 50 mcg/act nasal spray 2 sprays into each nostril daily OTC     • losartan (COZAAR) 50 mg tablet Take 1 tablet (50 mg total) by mouth daily 90 tablet 0   • methocarbamol (ROBAXIN) 500 mg tablet Take 1 tablet (500 mg total) by mouth 3 (three) times a day as needed for muscle spasms 90 tablet 3   • montelukast (SINGULAIR) 10 mg tablet Take 10 mg by mouth daily at bedtime     • Multiple Vitamin (multivitamin) tablet Take 1 tablet by mouth daily     • Omega-3 Fatty Acids (fish oil) 1,000 mg Take 1,000 mg by mouth daily     • Probiotic Product (PROBIOTIC-10 PO) Take 1 tablet by mouth in the morning     • rimegepant sulfate (NURTEC) 75 mg TBDP Take 1 tablet (75 mg total) by mouth daily as needed (at the onset of migraines) 8 tablet 3   • traZODone (DESYREL) 100 mg tablet Take 0.5 tablets (50 mg total) by mouth daily at bedtime as needed for sleep 90 tablet 0   • zinc gluconate 50 mg tablet Take 50 mg by mouth daily       No current facility-administered medications for this visit.       Allergies:     No Known Allergies   Physical Exam:     LMP 01/01/2015 (Within Years)     Physical Exam     Sarai Cool PA-C  [facility]

## 2023-11-03 ENCOUNTER — ANNUAL EXAM (OUTPATIENT)
Dept: OBGYN CLINIC | Facility: CLINIC | Age: 63
End: 2023-11-03
Payer: COMMERCIAL

## 2023-11-03 VITALS
HEIGHT: 65 IN | BODY MASS INDEX: 25.66 KG/M2 | DIASTOLIC BLOOD PRESSURE: 76 MMHG | WEIGHT: 154 LBS | SYSTOLIC BLOOD PRESSURE: 110 MMHG

## 2023-11-03 DIAGNOSIS — Z12.31 ENCOUNTER FOR SCREENING MAMMOGRAM FOR MALIGNANT NEOPLASM OF BREAST: ICD-10-CM

## 2023-11-03 DIAGNOSIS — Z01.419 WELL WOMAN EXAM WITH ROUTINE GYNECOLOGICAL EXAM: Primary | ICD-10-CM

## 2023-11-03 DIAGNOSIS — Z11.51 SCREENING FOR HPV (HUMAN PAPILLOMAVIRUS): ICD-10-CM

## 2023-11-03 DIAGNOSIS — Z12.4 ENCOUNTER FOR SCREENING FOR MALIGNANT NEOPLASM OF CERVIX: ICD-10-CM

## 2023-11-03 PROCEDURE — S0612 ANNUAL GYNECOLOGICAL EXAMINA: HCPCS | Performed by: OBSTETRICS & GYNECOLOGY

## 2023-11-03 PROCEDURE — G0145 SCR C/V CYTO,THINLAYER,RESCR: HCPCS | Performed by: OBSTETRICS & GYNECOLOGY

## 2023-11-03 PROCEDURE — G0476 HPV COMBO ASSAY CA SCREEN: HCPCS | Performed by: OBSTETRICS & GYNECOLOGY

## 2023-11-04 NOTE — PROGRESS NOTES
ASSESSMENT & PLAN: Yuliya Ray is a 58 y.o. [id number] with normal gynecologic exam.    1.  Routine well woman exam done today. 2.   Pap and HPV:Pap with HPV was done today. Current ASCCP Guidelines reviewed. Last Pap  [unfilled] :  no abnormalities. 3. Mammogram ordered. Recommend yearly mammography. 4.  Discussed vitamin D and calcium recommendation for dietary intake as well as supplementation. 5. The patient is sexually active. 6. The following were reviewed in today's visit: breast self exam, mammography screening ordered, osteoporosis, exercise, and healthy diet. 7. Patient to return to office in 12 months for WWE. All questions have been answered to her satisfaction. CC:  Annual Gynecologic Examination    HPI: Yuliya Ray is a 58 y.o. [id number] who presents for annual gynecologic examination. She has the following concerns:  none. Denies any PMB or vaginal discharge. Would like to discuss Vit D and Ca recommendation. Health Maintenance:    She exercises most days per week. She wears her seatbelt routinely. She does perform regular monthly self breast exams. She feels safe at home. Patients does follow a healthy diet.       Past Medical History:   Diagnosis Date    Anxiety 07/29/2022    Cervical radiculopathy 11/01/2018    Cervical spondylosis without myelopathy 11/01/2018    Chronic headaches     Diverticulosis 03/14/2023    History of colon polyps 07/29/2022    History of heart disease     History of Helicobacter pylori infection     Hypertension, essential 10/16/2019    Internal hemorrhoids 03/14/2023    Mitral valve prolapse     Mixed hyperlipidemia 07/11/2018    Non-seasonal allergic rhinitis     Osteopenia of multiple sites 07/24/2019    Other insomnia 07/29/2022    Overweight     Seasonal allergies     Spinal stenosis of lumbar region with neurogenic claudication 10/22/2021    Vitamin D deficiency 12/08/2020       Past Surgical History:   Procedure Laterality Date BREAST EXCISIONAL BIOPSY Left 1991    BREAST EXCISIONAL BIOPSY Left     benign    COLONOSCOPY  2018    DILATION AND CURETTAGE OF UTERUS      EGD  2018    LAMINECTOMY AND MICRODISCECTOMY LUMBAR SPINE         Past OB/Gyn History:   Patient's last menstrual period was 2015 (within years). Menstrual History:  OB History          5    Para   4    Term   4            AB   1    Living   4         SAB   1    IAB        Ectopic        Multiple        Live Births   4                Patient's last menstrual period was 2015 (within years). History of sexually transmitted infection No  Patient is currently sexually active. heterosexual Birth control: none. Last Pap  [unfilled] :  no abnormalities. Family History   Adopted: Yes   Problem Relation Age of Onset    No Known Problems Mother         Estranged    No Known Problems Father         Estranged    No Known Problems Daughter     No Known Problems Daughter     No Known Problems Son     Asperger's syndrome Son     Autism spectrum disorder Son     Bipolar disorder Son     Anxiety disorder Son        Social History:  Social History     Socioeconomic History    Marital status: /Civil Union     Spouse name: Not on file    Number of children: 4    Years of education: Not on file    Highest education level: Not on file   Occupational History    Occupation: Director of Nursing for Acute Care     Employer: [employer]   Tobacco Use    Smoking status: Never    Smokeless tobacco: Never   Vaping Use    Vaping Use: Never used   Substance and Sexual Activity    Alcohol use:  Yes     Alcohol/week: 8.0 standard drinks of alcohol     Types: 8 Glasses of wine per week    Drug use: Never    Sexual activity: Yes     Partners: Male     Birth control/protection: Male Sterilization, Post-menopausal     Comment: Vasectomy   Other Topics Concern    Not on file   Social History Narrative        Lives with  Boom, Adult Son Aníbal, 2 Daughters    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 Patient is .   Patient is currently employed [employer]  No Known Allergies    Current Outpatient Medications:     atorvastatin (LIPITOR) 10 mg tablet, Take 1 tablet (10 mg total) by mouth daily at bedtime, Disp: 90 tablet, Rfl: 0    b complex vitamins capsule, Take 1 capsule by mouth daily, Disp: , Rfl:     Butalbital-APAP-Caffeine (Fioricet) -40 MG CAPS, Take 50 mg by mouth every 6 (six) hours as needed (headache), Disp: 30 capsule, Rfl: 0    calcium gluconate 500 mg tablet, Take 1,000 mg by mouth daily, Disp: , Rfl:     cholecalciferol (VITAMIN D3) 1,000 units tablet, Take 1,000 Units by mouth daily, Disp: , Rfl:     fluticasone (FLONASE) 50 mcg/act nasal spray, 2 sprays into each nostril daily OTC, Disp: , Rfl:     losartan (COZAAR) 50 mg tablet, Take 1 tablet (50 mg total) by mouth daily, Disp: 90 tablet, Rfl: 0    methocarbamol (ROBAXIN) 500 mg tablet, Take 1 tablet (500 mg total) by mouth 3 (three) times a day as needed for muscle spasms, Disp: 90 tablet, Rfl: 3    montelukast (SINGULAIR) 10 mg tablet, Take 10 mg by mouth daily at bedtime, Disp: , Rfl:     Multiple Vitamin (multivitamin) tablet, Take 1 tablet by mouth daily, Disp: , Rfl:     Omega-3 Fatty Acids (fish oil) 1,000 mg, Take 1,000 mg by mouth daily, Disp: , Rfl:     Probiotic Product (PROBIOTIC-10 PO), Take 1 tablet by mouth in the morning, Disp: , Rfl:     rimegepant sulfate (NURTEC) 75 mg TBDP, Take 1 tablet (75 mg total) by mouth daily as needed (at the onset of migraines), Disp: 8 tablet, Rfl: 3    traZODone (DESYREL) 100 mg tablet, Take 0.5 tablets (50 mg total) by mouth daily at bedtime as needed for sleep, Disp: 90 tablet, Rfl: 0    zinc gluconate 50 mg tablet, Take 50 mg by mouth daily, Disp: , Rfl:     Review of Systems:  Review of Systems   Constitutional:  Negative for activity change, chills, fever and unexpected weight change. HENT:  Negative for congestion, ear pain, hearing loss and sore throat. Respiratory:  Negative for cough, chest tightness and shortness of breath. Cardiovascular:  Negative for chest pain and leg swelling. Gastrointestinal:  Negative for abdominal pain, constipation, diarrhea, nausea and vomiting. Genitourinary:  Negative for difficulty urinating, dysuria, frequency, menstrual problem, pelvic pain, vaginal discharge and vaginal pain. Skin:  Negative for color change and rash. Neurological:  Negative for dizziness, numbness and headaches. Psychiatric/Behavioral:  Negative for agitation and confusion. Physical Exam:  /76 (BP Location: Right arm, Patient Position: Sitting, Cuff Size: Standard)   Ht 5' 5" (1.651 m)   Wt 69.9 kg (154 lb)   LMP 01/01/2015 (Within Years)   BMI 25.63 kg/m²    Physical Exam  Constitutional:       General: She is not in acute distress. Appearance: Normal appearance. She is normal weight. Genitourinary:      Vulva, bladder, rectum and urethral meatus normal.      No lesions in the vagina. Right Labia: No rash, tenderness or lesions. Left Labia: No tenderness, lesions or rash. No labial fusion noted. No vaginal discharge or tenderness. No vaginal prolapse present. No vaginal atrophy present. Right Adnexa: not tender, not full and no mass present. Left Adnexa: not tender, not full and no mass present. No cervical motion tenderness or friability. Uterus is not enlarged or prolapsed. Breasts:     Breasts are soft.      Right: No inverted nipple, mass, nipple discharge or skin change. Left: No inverted nipple, mass, nipple discharge or skin change. HENT:      Head: Normocephalic and atraumatic. Nose: Nose normal.   Eyes:      Conjunctiva/sclera: Conjunctivae normal.      Pupils: Pupils are equal, round, and reactive to light. Cardiovascular:      Rate and Rhythm: Normal rate and regular rhythm. Pulses: Normal pulses. Heart sounds: Normal heart sounds. Pulmonary:      Effort: Pulmonary effort is normal. No respiratory distress. Breath sounds: Normal breath sounds. No wheezing. Abdominal:      General: Abdomen is flat. There is no distension. Palpations: Abdomen is soft. Tenderness: There is no abdominal tenderness. There is no guarding. Musculoskeletal:         General: Normal range of motion. Cervical back: Normal range of motion and neck supple. Neurological:      General: No focal deficit present. Mental Status: She is alert and oriented to person, place, and time. Mental status is at baseline. Skin:     General: Skin is warm and dry. Psychiatric:         Mood and Affect: Mood normal.         Behavior: Behavior normal.         Thought Content: Thought content normal.         Judgment: Judgment normal.   Vitals and nursing note reviewed.

## 2023-11-07 DIAGNOSIS — R51.9 NONINTRACTABLE HEADACHE, UNSPECIFIED CHRONICITY PATTERN, UNSPECIFIED HEADACHE TYPE: ICD-10-CM

## 2023-11-08 LAB
HPV HR 12 DNA CVX QL NAA+PROBE: NEGATIVE
HPV16 DNA CVX QL NAA+PROBE: NEGATIVE
HPV18 DNA CVX QL NAA+PROBE: NEGATIVE

## 2023-11-08 RX ORDER — BUTALBITAL, ACETAMINOPHEN AND CAFFEINE 300; 40; 50 MG/1; MG/1; MG/1
50 CAPSULE ORAL EVERY 6 HOURS PRN
Qty: 30 CAPSULE | Refills: 0 | Status: SHIPPED | OUTPATIENT
Start: 2023-11-08

## 2023-11-10 LAB
LAB AP GYN PRIMARY INTERPRETATION: NORMAL
Lab: NORMAL

## 2023-11-17 ENCOUNTER — PROCEDURE VISIT (OUTPATIENT)
Age: 63
End: 2023-11-17

## 2023-11-17 VITALS
DIASTOLIC BLOOD PRESSURE: 80 MMHG | HEIGHT: 65 IN | WEIGHT: 154 LBS | HEART RATE: 77 BPM | BODY MASS INDEX: 25.66 KG/M2 | SYSTOLIC BLOOD PRESSURE: 103 MMHG

## 2023-11-17 DIAGNOSIS — G89.29 CHRONIC BILATERAL LOW BACK PAIN WITH LEFT-SIDED SCIATICA: ICD-10-CM

## 2023-11-17 DIAGNOSIS — M54.2 NECK PAIN: Primary | ICD-10-CM

## 2023-11-17 DIAGNOSIS — M79.18 MYOFASCIAL PAIN: ICD-10-CM

## 2023-11-17 DIAGNOSIS — M54.12 CERVICAL RADICULOPATHY: ICD-10-CM

## 2023-11-17 DIAGNOSIS — M54.6 ACUTE BILATERAL THORACIC BACK PAIN: ICD-10-CM

## 2023-11-17 DIAGNOSIS — M54.42 CHRONIC BILATERAL LOW BACK PAIN WITH LEFT-SIDED SCIATICA: ICD-10-CM

## 2023-11-19 NOTE — PROGRESS NOTES
HPI:    Angelina Carlson is in for treatment of neck, upper back pain, and mid back spasm. VPAS  3 Neck  VPAS  5  Right leg    The following portions of the patient's history were reviewed and updated as appropriate: allergies, current medications, past family history, past medical history, past social history, past surgical history, and problem list.    Review of Systems    Physical Exam:  There is  segmental dysfunction T6-T7    Active triggers of myofascial involvement bilateral trapezii, rhomboid, levator scapula musculature joint dysfunction C5-C6    Pelvic obliquity is noted today with an elevated right versus left innominate internal rotation of the right innominate on sacrum paralumbar hypertonicity noted remaining exam stable joint dysfunction right SI L5-S1 motion unit. Assessment:   Diagnosis ICD-10-CM Associated Orders   1. Neck pain  M54.2       2. Myofascial pain  M79.18       3. Acute bilateral thoracic back pain  M54.6       4. Cervical radiculopathy  M54.12       5. Chronic bilateral low back pain with left-sided sciatica  M54.42     G89.29                 Treatment: 07914  Therapeutic myofascial release performed to the bilateral shoulder girdle. I used Graston technique. I used GT 2, GT 3, and GT 4 instruments. Scap thoracic mobilizations performed and well-tolerated. This was a 12-minute procedure    Manipulation provided to the C5 level via seated stairstepping technique well-tolerated. Manipulation right innominate, sacrum, L5 via Velasquez drop maneuver this did alleviate pelvic obliquity and was well-tolerated. Manipulation to T4, well tolerated. Discussion:  Continue flexion-based stretching daily icing see her back for follow-up.

## 2023-11-27 ENCOUNTER — TELEPHONE (OUTPATIENT)
Dept: NEUROSURGERY | Facility: CLINIC | Age: 63
End: 2023-11-27

## 2023-11-27 ENCOUNTER — OFFICE VISIT (OUTPATIENT)
Dept: NEUROSURGERY | Facility: CLINIC | Age: 63
End: 2023-11-27
Payer: COMMERCIAL

## 2023-11-27 VITALS
TEMPERATURE: 98 F | HEART RATE: 74 BPM | OXYGEN SATURATION: 96 % | SYSTOLIC BLOOD PRESSURE: 120 MMHG | WEIGHT: 159 LBS | HEIGHT: 65 IN | BODY MASS INDEX: 26.49 KG/M2 | DIASTOLIC BLOOD PRESSURE: 74 MMHG

## 2023-11-27 DIAGNOSIS — I67.1 CEREBRAL ANEURYSM, NONRUPTURED: ICD-10-CM

## 2023-11-27 DIAGNOSIS — I67.1 INTRACRANIAL ANEURYSM: ICD-10-CM

## 2023-11-27 DIAGNOSIS — G93.0 BRAIN CYST: ICD-10-CM

## 2023-11-27 DIAGNOSIS — Q04.6 PORENCEPHALIC CYST (HCC): Primary | ICD-10-CM

## 2023-11-27 PROCEDURE — 99214 OFFICE O/P EST MOD 30 MIN: CPT | Performed by: NEUROLOGICAL SURGERY

## 2023-11-27 NOTE — PROGRESS NOTES
Patient Id: Madelin Retana is a 58 y.o. 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 Left parietal cyst.   This has had some growth compared to 2009 but has been largely stable since we have been following her. There is no evidence of enhancement on prior imaging and she is asymptomatic. This likely demonstrates a porencephalic cyst.  We will plan on repeat MRI with and without contrast in 2 years. 2.  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 Leti Moura for this in the past.  This is a small, sub-3 mm aneurysm. At this juncture I believe we can be followed with an MRA in 2 years. In order to consolidate her physician visits I can follow her for both. I will discuss this with Dr. Leti Moura. I spent 30 minutes in the care of this patient including the review and interpretation of imaging and tests results, as well as communication/explanation to the patient  regarding the natural history of this process and test/imaging results, and documentation. Chief Complaint: Follow-up        HPI:   This is a very pleasant 79-year-old female with a prior history of headaches and known porencephalic cyst who presents for follow-up. She had known about the porencephalic cyst for many years in 2009 had imaging.   Due to headaches repeat imaging was performed and this showed slight growth in her cyst.  She was also found to have a persistent trigeminal artery with possible intracavernous aneurysm. She denies any other neurologic changes. She still continues to have headaches especially at night with the time and weather change. During the remainder of the year her headaches had improved. She denies any other neurologic changes or complaints. Her past medical history is largely insignificant. She has had a previous microdiscectomy. She is not allergic to any medications. She is  with two sons ages 39 and 29 and two daughters ages 28 and 32. 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 She denies tobacco and illicit drug use. She was adopted and therefore does not know of any pertinent family history realted to this visit. Review of systems obtained by the MA reviewed and updated below. Review of Systems   Constitutional: Negative. HENT:  Negative for tinnitus. Eyes:  Negative for visual disturbance. Respiratory: Negative. Cardiovascular: Negative. Gastrointestinal: Negative. Endocrine: Negative. Genitourinary: Negative. Musculoskeletal:  Positive for neck pain (between the shoulder blade) and neck stiffness (back of the neck from left to right). Negative for gait problem and myalgias. Neurological:  Positive for headaches (intermittent behind left eye and right side of head). Negative for dizziness, tremors, seizures, syncope, speech difficulty, weakness, light-headedness and numbness. Hematological:  Bruises/bleeds easily. Psychiatric/Behavioral:  Negative for confusion, decreased concentration and sleep disturbance. The patient is not nervous/anxious. Physical Exam  Vitals:    11/27/23 1423   BP: 120/74   Pulse: 74   Temp: 98 °F (36.7 °C)   SpO2: 96%   She is well appearing. Affect is appropriate. Body mass index is 26.46 kg/m²Binu Alvarez She is awake alert and oriented. Hearing and vision are grossly intact. Her pupils are equal round reactive to light.   Her extraocular movements are intact. Her face is symmetric. Tongue is midline. Facial sensation is intact and symmetric throughout. Shoulder shrug is 5/5. There is no drift or dysmetria. She has full strength in her bilateral upper and lower extremities. She has normal muscle tone muscle bulk. Her biceps reflexes and patellar reflexes are 2+ and symmetric. Shabnam sign negative bilaterally. Sensation intact to light touch and pinprick throughout. Her gait is normal.     Her heart rate is regular. Normal respiratory effort. Abdomen nondistended. Radial pulses 2+.      The following portions of the patient's history were reviewed and updated as appropriate: allergies, current medications, past family history, past medical history, past social history, past surgical history, and problem list.    Active Ambulatory Problems     Diagnosis Date Noted    Vitamin D deficiency 12/08/2020    Spinal stenosis of lumbar region with neurogenic claudication 10/22/2021    Osteopenia of multiple sites 07/24/2019    Mixed hyperlipidemia 07/11/2018    Hypertension, essential 10/16/2019    Cervical spondylosis without myelopathy 11/01/2018    Cervical radiculopathy 11/01/2018    History of colon polyps 07/29/2022    Anxiety 07/29/2022    Other insomnia 07/29/2022    Non-seasonal allergic rhinitis 08/01/2022    Overweight 08/01/2022    Major depressive disorder with single episode, in remission ([address]) 08/01/2022    Intervertebral disc disorder with radiculopathy of lumbar region     Aneurysm ([address]) 07/11/2023    Cerebral aneurysm, nonruptured 07/12/2023    Headache 36/87/9260    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[address]) 48/42/6174    External hemorrhoids 11/02/2023     Resolved Ambulatory Problems     Diagnosis Date Noted    No Resolved Ambulatory Problems     Past Medical History:   Diagnosis Date    Brain concussion 2015    Cervical disc disorder 1985    Chronic headaches     Diverticulosis 03/14/2023    Headache(784.0) 11/202022    History of heart disease     History of Helicobacter pylori infection     Internal hemorrhoids 03/14/2023    Low back pain 2011    Lumbosacral disc disease 2011    Mitral valve prolapse     Seasonal allergies     Thoracic disc disorder 2021       Past Surgical History:   Procedure Laterality Date    BREAST EXCISIONAL BIOPSY Left 1991    BREAST EXCISIONAL BIOPSY Left     benign    COLONOSCOPY  02/26/2018    DILATION AND CURETTAGE OF UTERUS      EGD  02/26/2018    LAMINECTOMY  2011    LAMINECTOMY AND MICRODISCECTOMY LUMBAR SPINE  2011         Current Outpatient Medications:     atorvastatin (LIPITOR) 10 mg tablet, Take 1 tablet (10 mg total) by mouth daily at bedtime, Disp: 90 tablet, Rfl: 0    b complex vitamins capsule, Take 1 capsule by mouth daily, Disp: , Rfl:     Butalbital-APAP-Caffeine (Fioricet) -40 MG CAPS, Take 50 mg by mouth every 6 (six) hours as needed (headache), Disp: 30 capsule, Rfl: 0    calcium gluconate 500 mg tablet, Take 1,000 mg by mouth daily, Disp: , Rfl:     cholecalciferol (VITAMIN D3) 1,000 units tablet, Take 1,000 Units by mouth daily, Disp: , Rfl:     fluticasone (FLONASE) 50 mcg/act nasal spray, 2 sprays into each nostril daily OTC, Disp: , Rfl:     losartan (COZAAR) 50 mg tablet, Take 1 tablet (50 mg total) by mouth daily, Disp: 90 tablet, Rfl: 0    methocarbamol (ROBAXIN) 500 mg tablet, Take 1 tablet (500 mg total) by mouth 3 (three) times a day as needed for muscle spasms, Disp: 90 tablet, Rfl: 3    montelukast (SINGULAIR) 10 mg tablet, Take 10 mg by mouth daily at bedtime, Disp: , Rfl:     Multiple Vitamin (multivitamin) tablet, Take 1 tablet by mouth daily, Disp: , Rfl:     Omega-3 Fatty Acids (fish oil) 1,000 mg, Take 1,000 mg by mouth daily, Disp: , Rfl:     Probiotic Product (PROBIOTIC-10 PO), Take 1 tablet by mouth in the morning, Disp: , Rfl:     traZODone (DESYREL) 100 mg tablet, Take 0.5 tablets (50 mg total) by mouth daily at bedtime as needed for sleep, Disp: 90 tablet, Rfl: 0    zinc gluconate 50 mg tablet, Take 50 mg by mouth daily, Disp: , Rfl:     rimegepant sulfate (NURTEC) 75 mg TBDP, Take 1 tablet (75 mg total) by mouth daily as needed (at the onset of migraines) (Patient not taking: Reported on 11/27/2023), Disp: 8 tablet, Rfl: 3    Results/Data: Imaging reviewed in detail with patient as well as reports.

## 2023-11-27 NOTE — TELEPHONE ENCOUNTER
11/27/23 Patient was scheduled with dr. Rika Peterson on 8/23/24 as per dr Jose bay to cancel and make snpx with him in 2 years with an Mri Head and Cta head. Recall set in epic for 2 years.

## 2023-11-29 ENCOUNTER — TELEPHONE (OUTPATIENT)
Dept: PSYCHIATRY | Facility: CLINIC | Age: 63
End: 2023-11-29

## 2023-11-29 NOTE — TELEPHONE ENCOUNTER
Contacted patient in regards  routine referral and placing patient on proper wait list. Patient would like to be added to talk therapy waiting list, in person and female.

## 2023-12-04 ENCOUNTER — OFFICE VISIT (OUTPATIENT)
Age: 63
End: 2023-12-04
Payer: COMMERCIAL

## 2023-12-04 VITALS
OXYGEN SATURATION: 99 % | HEIGHT: 65 IN | SYSTOLIC BLOOD PRESSURE: 128 MMHG | WEIGHT: 157 LBS | HEART RATE: 73 BPM | BODY MASS INDEX: 26.16 KG/M2 | RESPIRATION RATE: 16 BRPM | DIASTOLIC BLOOD PRESSURE: 84 MMHG

## 2023-12-04 DIAGNOSIS — R79.89 ELEVATED BRAIN NATRIURETIC PEPTIDE (BNP) LEVEL: Primary | ICD-10-CM

## 2023-12-04 DIAGNOSIS — R06.09 EXERTIONAL DYSPNEA: ICD-10-CM

## 2023-12-04 PROCEDURE — 99213 OFFICE O/P EST LOW 20 MIN: CPT | Performed by: INTERNAL MEDICINE

## 2023-12-04 NOTE — PROGRESS NOTES
Assessment/Plan:    Diagnoses and all orders for this visit:    Elevated brain natriuretic peptide (BNP) level  -     Cancel: B-Type Natriuretic Peptide(BNP); Future  -     Cancel: Echo complete w/ contrast if indicated; Future  -     Echo complete w/ contrast if indicated; Future  -     B-Type Natriuretic Peptide(BNP); Future    Exertional dyspnea  -     Echo complete w/ contrast if indicated; Future  -     B-Type Natriuretic Peptide(BNP); Future              There are no Patient Instructions on file for this visit. Subjective:      Patient ID: Gabe Waters is a 58 y.o. female    Patient presents to follow-up insurance lab work. She was noted to have an elevated , normal range less than 125. She notes recently having traveled to [facility] and experiencing some exertion related dyspnea. Otherwise she keeps active and is able to swim a mile 3 times per week with no exertional chest pain or shortness of breath.           Current Outpatient Medications:     atorvastatin (LIPITOR) 10 mg tablet, Take 1 tablet (10 mg total) by mouth daily at bedtime, Disp: 90 tablet, Rfl: 0    b complex vitamins capsule, Take 1 capsule by mouth daily, Disp: , Rfl:     Butalbital-APAP-Caffeine (Fioricet) -40 MG CAPS, Take 50 mg by mouth every 6 (six) hours as needed (headache), Disp: 30 capsule, Rfl: 0    calcium gluconate 500 mg tablet, Take 1,000 mg by mouth daily, Disp: , Rfl:     cholecalciferol (VITAMIN D3) 1,000 units tablet, Take 1,000 Units by mouth daily, Disp: , Rfl:     fluticasone (FLONASE) 50 mcg/act nasal spray, 2 sprays into each nostril daily OTC, Disp: , Rfl:     losartan (COZAAR) 50 mg tablet, Take 1 tablet (50 mg total) by mouth daily, Disp: 90 tablet, Rfl: 0    methocarbamol (ROBAXIN) 500 mg tablet, Take 1 tablet (500 mg total) by mouth 3 (three) times a day as needed for muscle spasms, Disp: 90 tablet, Rfl: 3    montelukast (SINGULAIR) 10 mg tablet, Take 10 mg by mouth daily at bedtime, Disp: , Rfl: Multiple Vitamin (multivitamin) tablet, Take 1 tablet by mouth daily, Disp: , Rfl:     Omega-3 Fatty Acids (fish oil) 1,000 mg, Take 1,000 mg by mouth daily, Disp: , Rfl:     Probiotic Product (PROBIOTIC-10 PO), Take 1 tablet by mouth in the morning, Disp: , Rfl:     rimegepant sulfate (NURTEC) 75 mg TBDP, Take 1 tablet (75 mg total) by mouth daily as needed (at the onset of migraines), Disp: 8 tablet, Rfl: 3    traZODone (DESYREL) 100 mg tablet, Take 0.5 tablets (50 mg total) by mouth daily at bedtime as needed for sleep, Disp: 90 tablet, Rfl: 0    zinc gluconate 50 mg tablet, Take 50 mg by mouth daily, Disp: , Rfl:     Recent Results (from the past 1008 hour(s))   Liquid-based pap, screening    Collection Time: 11/03/23 11:46 AM   Result Value Ref Range    Case Report       Gynecologic Cytology Report                       Case: [id number]                                  Authorizing Provider:  Kenton Vera MD     Collected:           11/03/2023 1146              Ordering Location:     [facility]  Received:            11/03/2023 1146                                     Health                                                                       First Screen:          Nancee Saint                                                                 Specimen:    LIQUID-BASED PAP, SCREENING, Cervix, Endocervical                                          Primary Interpretation Negative for intraepithelial lesion or malignancy     Specimen Adequacy       Satisfactory for evaluation. Endocervical/transformation zone component present. Additional Information       [employer]'s FDA approved ,  and ThinPrep Imaging Duo System are utilized with strict adherence to the 's instruction manual to prepare gynecologic and non-gynecologic cytology specimens for the production of ThinPrep slides as well as for gynecologic ThinPrep imaging.  These processes have been validated by our laboratory and/or by the . The Pap test is not a diagnostic procedure and should not be used as the sole means to detect cervical cancer. It is only a screening procedure to aid in the detection of cervical cancer and its precursors. Both false-negative and false-positive results have been experienced. Your patient's test result should be interpreted in this context together with the history and clinical findings. HPV High Risk    Collection Time: 11/03/23 11:46 AM    Specimen: Thin-Prep Vial   Result Value Ref Range    HPV Other HR Negative Negative    HPV16 Negative Negative    HPV18 Negative Negative       The following portions of the patient's history were reviewed and updated as appropriate: allergies, current medications, past family history, past medical history, past social history, past surgical history and problem list.     Review of Systems   Constitutional:  Negative for appetite change, chills, diaphoresis, fatigue, fever and unexpected weight change. HENT:  Negative for congestion, hearing loss and rhinorrhea. Eyes:  Negative for visual disturbance. Respiratory:  Positive for shortness of breath. Negative for cough, chest tightness and wheezing. Cardiovascular:  Negative for chest pain, palpitations and leg swelling. Gastrointestinal:  Negative for abdominal pain and blood in stool. Endocrine: Negative for cold intolerance, heat intolerance, polydipsia and polyuria. Genitourinary:  Negative for difficulty urinating, dysuria, frequency and urgency. Musculoskeletal:  Negative for arthralgias and myalgias. Skin:  Negative for rash. Neurological:  Negative for dizziness, weakness, light-headedness and headaches. Hematological:  Does not bruise/bleed easily. Psychiatric/Behavioral:  Negative for dysphoric mood and sleep disturbance.           Objective:      Vitals:    12/04/23 1624   BP: 128/84   Pulse: 73   Resp: 16   SpO2: 99%          Physical Exam  Constitutional:       Appearance: She is well-developed. HENT:      Head: Normocephalic and atraumatic. Nose: Nose normal.   Eyes:      General: No scleral icterus. Conjunctiva/sclera: Conjunctivae normal.      Pupils: Pupils are equal, round, and reactive to light. Neck:      Thyroid: No thyromegaly. Vascular: No JVD. Trachea: No tracheal deviation. Cardiovascular:      Rate and Rhythm: Normal rate and regular rhythm. Heart sounds: No murmur heard. No friction rub. No gallop. Pulmonary:      Effort: Pulmonary effort is normal. No respiratory distress. Breath sounds: Normal breath sounds. No wheezing or rales. Musculoskeletal:         General: No deformity. Cervical back: Normal range of motion and neck supple. Lymphadenopathy:      Cervical: No cervical adenopathy. Skin:     General: Skin is warm and dry. Coloration: Skin is not pale. Findings: No erythema or rash. Neurological:      Mental Status: She is alert and oriented to person, place, and time. Cranial Nerves: No cranial nerve deficit. Psychiatric:         Behavior: Behavior normal.         Thought Content:  Thought content normal.         Judgment: Judgment normal.

## 2023-12-06 ENCOUNTER — HOSPITAL ENCOUNTER (OUTPATIENT)
Dept: NON INVASIVE DIAGNOSTICS | Facility: CLINIC | Age: 63
Discharge: HOME | End: 2023-12-06
Payer: COMMERCIAL

## 2023-12-06 VITALS
BODY MASS INDEX: 26.16 KG/M2 | HEIGHT: 65 IN | SYSTOLIC BLOOD PRESSURE: 128 MMHG | HEART RATE: 73 BPM | WEIGHT: 157 LBS | DIASTOLIC BLOOD PRESSURE: 84 MMHG

## 2023-12-06 DIAGNOSIS — R79.89 ELEVATED BRAIN NATRIURETIC PEPTIDE (BNP) LEVEL: ICD-10-CM

## 2023-12-06 DIAGNOSIS — R06.09 EXERTIONAL DYSPNEA: ICD-10-CM

## 2023-12-06 LAB
AORTIC ROOT: 3.2 CM
APICAL FOUR CHAMBER EJECTION FRACTION: 53 %
ASCENDING AORTA: 3.3 CM
E WAVE DECELERATION TIME: 282 MS
E/A RATIO: 0.82
FRACTIONAL SHORTENING: 30 (ref 28–44)
INTERVENTRICULAR SEPTUM IN DIASTOLE (PARASTERNAL SHORT AXIS VIEW): 1.1 CM
INTERVENTRICULAR SEPTUM: 1.1 CM (ref 0.6–1.1)
LAAS-AP2: 17.7 CM2
LAAS-AP4: 14.1 CM2
LEFT ATRIUM SIZE: 3.4 CM
LEFT ATRIUM VOLUME (MOD BIPLANE): 46 ML
LEFT ATRIUM VOLUME INDEX (MOD BIPLANE): 25.8 ML/M2
LEFT INTERNAL DIMENSION IN SYSTOLE: 3.1 CM (ref 2.1–4)
LEFT VENTRICULAR INTERNAL DIMENSION IN DIASTOLE: 4.4 CM (ref 3.5–6)
LEFT VENTRICULAR POSTERIOR WALL IN END DIASTOLE: 1.2 CM
LEFT VENTRICULAR STROKE VOLUME: 50 ML
LVSV (TEICH): 50 ML
MV E'TISSUE VEL-SEP: 6 CM/S
MV PEAK A VEL: 0.78 M/S
MV PEAK E VEL: 64 CM/S
MV STENOSIS PRESSURE HALF TIME: 82 MS
MV VALVE AREA P 1/2 METHOD: 2.68
RA PRESSURE ESTIMATED: 3 MMHG
RIGHT ATRIUM AREA SYSTOLE A4C: 16.5 CM2
RIGHT VENTRICLE ID DIMENSION: 3.7 CM
RV PSP: 26 MMHG
SL CV LEFT ATRIUM LENGTH A2C: 5.3 CM
SL CV LV EF: 55
SL CV PED ECHO LEFT VENTRICLE DIASTOLIC VOLUME (MOD BIPLANE) 2D: 86 ML
SL CV PED ECHO LEFT VENTRICLE SYSTOLIC VOLUME (MOD BIPLANE) 2D: 36 ML
TR MAX PG: 23 MMHG
TR PEAK VELOCITY: 2.4 M/S
TRICUSPID ANNULAR PLANE SYSTOLIC EXCURSION: 2.4 CM
TRICUSPID VALVE PEAK REGURGITATION VELOCITY: 2.38 M/S

## 2023-12-06 PROCEDURE — 93306 TTE W/DOPPLER COMPLETE: CPT | Performed by: INTERNAL MEDICINE

## 2023-12-06 PROCEDURE — 93306 TTE W/DOPPLER COMPLETE: CPT

## 2023-12-12 ENCOUNTER — APPOINTMENT (OUTPATIENT)
Dept: LAB | Facility: CLINIC | Age: 63
End: 2023-12-12
Payer: COMMERCIAL

## 2023-12-12 ENCOUNTER — TELEPHONE (OUTPATIENT)
Age: 63
End: 2023-12-12

## 2023-12-12 DIAGNOSIS — R79.89 ELEVATED BRAIN NATRIURETIC PEPTIDE (BNP) LEVEL: ICD-10-CM

## 2023-12-12 DIAGNOSIS — R06.09 EXERTIONAL DYSPNEA: ICD-10-CM

## 2023-12-12 LAB — BNP SERPL-MCNC: 14 PG/ML (ref 0–100)

## 2023-12-12 PROCEDURE — 83880 ASSAY OF NATRIURETIC PEPTIDE: CPT

## 2023-12-12 PROCEDURE — 36415 COLL VENOUS BLD VENIPUNCTURE: CPT

## 2023-12-12 NOTE — TELEPHONE ENCOUNTER
Rep Shepard from the patients insurance company [employer] called in asking for information on the patients visit to our office and to confirm our fax number to send over paper work for the patient

## 2024-01-05 DIAGNOSIS — I51.7 LVH (LEFT VENTRICULAR HYPERTROPHY): Primary | ICD-10-CM

## 2024-01-11 ENCOUNTER — OFFICE VISIT (OUTPATIENT)
Age: 64
End: 2024-01-11
Payer: COMMERCIAL

## 2024-01-11 ENCOUNTER — TELEPHONE (OUTPATIENT)
Age: 64
End: 2024-01-11

## 2024-01-11 VITALS
HEART RATE: 60 BPM | SYSTOLIC BLOOD PRESSURE: 124 MMHG | DIASTOLIC BLOOD PRESSURE: 80 MMHG | HEIGHT: 65 IN | OXYGEN SATURATION: 96 % | RESPIRATION RATE: 16 BRPM | WEIGHT: 153 LBS | BODY MASS INDEX: 25.49 KG/M2

## 2024-01-11 DIAGNOSIS — J06.9 VIRAL UPPER RESPIRATORY TRACT INFECTION: Primary | ICD-10-CM

## 2024-01-11 LAB
S PYO AG THROAT QL: NEGATIVE
SARS-COV-2 AG UPPER RESP QL IA: NEGATIVE
SL AMB POCT RAPID FLU A: NORMAL
SL AMB POCT RAPID FLU B: NORMAL
VALID CONTROL: NORMAL

## 2024-01-11 PROCEDURE — 87811 SARS-COV-2 COVID19 W/OPTIC: CPT

## 2024-01-11 PROCEDURE — 99213 OFFICE O/P EST LOW 20 MIN: CPT

## 2024-01-11 PROCEDURE — 87880 STREP A ASSAY W/OPTIC: CPT

## 2024-01-11 PROCEDURE — 87804 INFLUENZA ASSAY W/OPTIC: CPT

## 2024-01-11 RX ORDER — FLUTICASONE PROPIONATE 50 MCG
2 SPRAY, SUSPENSION (ML) NASAL DAILY
Qty: 15.8 ML | Refills: 0 | Status: SHIPPED | OUTPATIENT
Start: 2024-01-11

## 2024-01-11 RX ORDER — ALBUTEROL SULFATE 90 UG/1
2 AEROSOL, METERED RESPIRATORY (INHALATION) EVERY 6 HOURS PRN
Qty: 18 G | Refills: 0 | Status: SHIPPED | OUTPATIENT
Start: 2024-01-11

## 2024-01-11 RX ORDER — AZITHROMYCIN 250 MG/1
TABLET, FILM COATED ORAL
Qty: 6 TABLET | Refills: 0 | Status: SHIPPED | OUTPATIENT
Start: 2024-01-11 | End: 2024-01-15
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
## 2024-01-11 NOTE — TELEPHONE ENCOUNTER
Patient called to see if there was a same day spot to come in and be evaluated for her progressive and constant cough

## 2024-01-17 DIAGNOSIS — R51.9 NONINTRACTABLE HEADACHE, UNSPECIFIED CHRONICITY PATTERN, UNSPECIFIED HEADACHE TYPE: ICD-10-CM

## 2024-01-17 DIAGNOSIS — I10 HYPERTENSION, ESSENTIAL: ICD-10-CM

## 2024-01-17 DIAGNOSIS — E78.2 MIXED HYPERLIPIDEMIA: ICD-10-CM

## 2024-01-17 DIAGNOSIS — G47.09 OTHER INSOMNIA: ICD-10-CM

## 2024-01-17 RX ORDER — TRAZODONE HYDROCHLORIDE 100 MG/1
50 TABLET ORAL
Qty: 90 TABLET | Refills: 0 | Status: SHIPPED | OUTPATIENT
Start: 2024-01-17

## 2024-01-17 RX ORDER — ATORVASTATIN CALCIUM 10 MG/1
10 TABLET, FILM COATED ORAL
Qty: 90 TABLET | Refills: 0 | Status: SHIPPED | OUTPATIENT
Start: 2024-01-17

## 2024-01-17 RX ORDER — LOSARTAN POTASSIUM 50 MG/1
50 TABLET ORAL DAILY
Qty: 90 TABLET | Refills: 0 | Status: SHIPPED | OUTPATIENT
Start: 2024-01-17

## 2024-01-17 RX ORDER — BUTALBITAL, ACETAMINOPHEN AND CAFFEINE 300; 40; 50 MG/1; MG/1; MG/1
50 CAPSULE ORAL EVERY 6 HOURS PRN
Qty: 30 CAPSULE | Refills: 0 | Status: SHIPPED | OUTPATIENT
Start: 2024-01-17

## 2024-01-17 RX ORDER — LOSARTAN POTASSIUM 50 MG/1
50 TABLET ORAL DAILY
Qty: 90 TABLET | Refills: 0 | Status: SHIPPED | OUTPATIENT
Start: 2024-01-17 | End: 2025-01-16

## 2024-01-18 ENCOUNTER — OFFICE VISIT (OUTPATIENT)
Dept: NEUROLOGY | Facility: CLINIC | Age: 64
End: 2024-01-18
Payer: COMMERCIAL

## 2024-01-18 VITALS
SYSTOLIC BLOOD PRESSURE: 120 MMHG | DIASTOLIC BLOOD PRESSURE: 80 MMHG | BODY MASS INDEX: 26.49 KG/M2 | WEIGHT: 159 LBS | HEIGHT: 65 IN | HEART RATE: 62 BPM

## 2024-01-18 DIAGNOSIS — Q04.6 PORENCEPHALIC CYST (HCC): ICD-10-CM

## 2024-01-18 DIAGNOSIS — G43.009 MIGRAINE WITHOUT AURA AND WITHOUT STATUS MIGRAINOSUS, NOT INTRACTABLE: Primary | ICD-10-CM

## 2024-01-18 DIAGNOSIS — I67.1 CEREBRAL ANEURYSM, NONRUPTURED: ICD-10-CM

## 2024-01-18 PROCEDURE — 99213 OFFICE O/P EST LOW 20 MIN: CPT | Performed by: STUDENT IN AN ORGANIZED HEALTH CARE EDUCATION/TRAINING PROGRAM

## 2024-01-18 NOTE — PATIENT INSTRUCTIONS
Patient Instructions:  -can continue using fioricet as needed for migraine headaches  -follow up with neurology as needed
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
## 2024-01-22 ENCOUNTER — HOSPITAL ENCOUNTER (OUTPATIENT)
Dept: MAMMOGRAPHY | Facility: HOSPITAL | Age: 64
Discharge: HOME | End: 2024-01-22
Attending: OBSTETRICS & GYNECOLOGY
Payer: COMMERCIAL

## 2024-01-22 VITALS — WEIGHT: 155 LBS | HEIGHT: 65 IN | BODY MASS INDEX: 25.83 KG/M2

## 2024-01-22 DIAGNOSIS — Z12.31 ENCOUNTER FOR SCREENING MAMMOGRAM FOR MALIGNANT NEOPLASM OF BREAST: ICD-10-CM

## 2024-01-22 PROCEDURE — 77067 SCR MAMMO BI INCL CAD: CPT

## 2024-01-22 PROCEDURE — 77063 BREAST TOMOSYNTHESIS BI: CPT

## 2024-01-24 ENCOUNTER — HOSPITAL ENCOUNTER (OUTPATIENT)
Dept: MAMMOGRAPHY | Facility: CLINIC | Age: 64
Discharge: HOME | End: 2024-01-24

## 2024-01-24 ENCOUNTER — HOSPITAL ENCOUNTER (OUTPATIENT)
Dept: ULTRASOUND IMAGING | Facility: CLINIC | Age: 64
Discharge: HOME | End: 2024-01-24
Admitting: RADIOLOGY
Payer: COMMERCIAL

## 2024-01-24 ENCOUNTER — HOSPITAL ENCOUNTER (OUTPATIENT)
Dept: ULTRASOUND IMAGING | Facility: CLINIC | Age: 64
Discharge: HOME | End: 2024-01-24
Payer: COMMERCIAL

## 2024-01-24 VITALS — HEART RATE: 72 BPM | SYSTOLIC BLOOD PRESSURE: 121 MMHG | DIASTOLIC BLOOD PRESSURE: 85 MMHG

## 2024-01-24 DIAGNOSIS — R92.8 ABNORMAL FINDING ON BREAST IMAGING: ICD-10-CM

## 2024-01-24 DIAGNOSIS — R93.89 ABNORMAL ULTRASOUND: ICD-10-CM

## 2024-01-24 DIAGNOSIS — R92.8 ABNORMAL MAMMOGRAM: ICD-10-CM

## 2024-01-24 PROCEDURE — 76942 ECHO GUIDE FOR BIOPSY: CPT

## 2024-01-24 PROCEDURE — 19000 PUNCTURE ASPIR CYST BREAST: CPT

## 2024-01-24 PROCEDURE — 76642 ULTRASOUND BREAST LIMITED: CPT

## 2024-01-24 RX ORDER — LIDOCAINE HYDROCHLORIDE 10 MG/ML
5 INJECTION, SOLUTION EPIDURAL; INFILTRATION; INTRACAUDAL; PERINEURAL ONCE
Status: COMPLETED | OUTPATIENT
Start: 2024-01-24 | End: 2024-01-24

## 2024-01-24 RX ADMIN — LIDOCAINE HYDROCHLORIDE 5 ML: 10 INJECTION, SOLUTION EPIDURAL; INFILTRATION; INTRACAUDAL; PERINEURAL at 14:16

## 2024-01-24 NOTE — PROGRESS NOTES
Met with patient and Dr. Tyler Whitman regarding recommendation for;    _____ RIGHT ___X___LEFT      ___X__Ultrasound guided  ______Stereotactic cyst aspiration.      __X___Verbalized understanding.      Blood thinners:  No: __X___ Yes: ______ What:            Pt to have same day procedure, pt with no questions at this time.

## 2024-01-24 NOTE — PROGRESS NOTES
Procedure type:    ___X__ultrasound guided _____stereotactic    Breast:    __X___Left _____Right, cyst aspiration    Location:10:00 4cmfn    Needle: 18G    # of passes:1    Clip:none    Performed by:Dr. Tyler Whitman    Pressure held for 5 minutes by:Letty Bruce RN    Steri Strips:    ____yes ___X__no    Band aid:    __X___yes_____no    Tolerated procedure:    __X___yes _____no

## 2024-01-25 NOTE — PROGRESS NOTES
Post procedure call completed    Bleeding: _____yes __X___no, pt denies    Pain: _____yes ___X___no, pt denies    Redness/Swelling: ______yes ___X___no, pt denies, used ice pack as directed    Band aid removed: ___X__yes _____no      Pt with no questions at this time, adv to call with any questions or concerns, has name/# for contact

## 2024-02-02 DIAGNOSIS — J06.9 VIRAL UPPER RESPIRATORY TRACT INFECTION: ICD-10-CM

## 2024-02-02 RX ORDER — ALBUTEROL SULFATE 90 UG/1
AEROSOL, METERED RESPIRATORY (INHALATION)
Qty: 18 G | Refills: 3 | Status: SHIPPED | OUTPATIENT
Start: 2024-02-02

## 2024-02-07 DIAGNOSIS — J06.9 VIRAL UPPER RESPIRATORY TRACT INFECTION: ICD-10-CM

## 2024-02-07 RX ORDER — FLUTICASONE PROPIONATE 50 MCG
2 SPRAY, SUSPENSION (ML) NASAL DAILY
Qty: 16 ML | Refills: 3 | Status: SHIPPED | OUTPATIENT
Start: 2024-02-07

## 2024-02-16 ENCOUNTER — TELEPHONE (OUTPATIENT)
Age: 64
End: 2024-02-16

## 2024-02-16 DIAGNOSIS — R51.9 NONINTRACTABLE HEADACHE, UNSPECIFIED CHRONICITY PATTERN, UNSPECIFIED HEADACHE TYPE: ICD-10-CM

## 2024-02-16 NOTE — TELEPHONE ENCOUNTER
Saw you 1/11/24  Still has persistant cough, has to sleep with cough drops, wants to know what you recommend for her to do?    No appts left for today

## 2024-02-19 RX ORDER — BUTALBITAL, ACETAMINOPHEN AND CAFFEINE 300; 40; 50 MG/1; MG/1; MG/1
50 CAPSULE ORAL EVERY 6 HOURS PRN
Qty: 30 CAPSULE | Refills: 0 | Status: SHIPPED | OUTPATIENT
Start: 2024-02-19

## 2024-02-22 ENCOUNTER — CONSULT (OUTPATIENT)
Dept: CARDIOLOGY CLINIC | Facility: CLINIC | Age: 64
End: 2024-02-22
Payer: COMMERCIAL

## 2024-02-22 VITALS
HEIGHT: 65 IN | SYSTOLIC BLOOD PRESSURE: 132 MMHG | OXYGEN SATURATION: 99 % | DIASTOLIC BLOOD PRESSURE: 80 MMHG | HEART RATE: 72 BPM | WEIGHT: 155.6 LBS | BODY MASS INDEX: 25.92 KG/M2

## 2024-02-22 DIAGNOSIS — E78.2 MIXED HYPERLIPIDEMIA: ICD-10-CM

## 2024-02-22 DIAGNOSIS — I10 PRIMARY HYPERTENSION: ICD-10-CM

## 2024-02-22 DIAGNOSIS — I51.7 LVH (LEFT VENTRICULAR HYPERTROPHY): Primary | ICD-10-CM

## 2024-02-22 PROCEDURE — 99243 OFF/OP CNSLTJ NEW/EST LOW 30: CPT | Performed by: INTERNAL MEDICINE

## 2024-02-22 RX ORDER — SULFAMETHOXAZOLE AND TRIMETHOPRIM 400; 80 MG/1; MG/1
2 TABLET ORAL EVERY 12 HOURS SCHEDULED
COMMUNITY

## 2024-02-22 RX ORDER — BENZONATATE 100 MG/1
100 CAPSULE ORAL 3 TIMES DAILY PRN
COMMUNITY
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
## 2024-02-27 ENCOUNTER — HOSPITAL ENCOUNTER (OUTPATIENT)
Dept: CT IMAGING | Facility: HOSPITAL | Age: 64
Discharge: HOME | End: 2024-02-27
Attending: INTERNAL MEDICINE
Payer: COMMERCIAL

## 2024-02-27 DIAGNOSIS — I51.7 LVH (LEFT VENTRICULAR HYPERTROPHY): ICD-10-CM

## 2024-02-27 DIAGNOSIS — E78.2 MIXED HYPERLIPIDEMIA: ICD-10-CM

## 2024-02-27 PROCEDURE — G1004 CDSM NDSC: HCPCS

## 2024-02-27 PROCEDURE — 75571 CT HRT W/O DYE W/CA TEST: CPT

## 2024-03-01 ENCOUNTER — TELEPHONE (OUTPATIENT)
Age: 64
End: 2024-03-01

## 2024-03-01 NOTE — TELEPHONE ENCOUNTER
S/w pt, she would like to schedule a repeat R L4-5 TFESI, last one was 10/4/23. Pt said symptoms (right leg heaviness/numbness) returned one week ago and last injection provided 98% improvement. Pain in same area. OK to repeat?    Pt denies blood thinners

## 2024-03-01 NOTE — TELEPHONE ENCOUNTER
Caller: Odalys     Doctor: Dr Dhaliwal    Reason for call: Patient calling would like to schedule procedure please advise     Call back#: [phone]

## 2024-03-05 NOTE — TELEPHONE ENCOUNTER
Left message for patient to contact the scheduling office at [phone] to schedule their procedure.

## 2024-03-06 ENCOUNTER — TELEPHONE (OUTPATIENT)
Age: 64
End: 2024-03-06

## 2024-03-06 DIAGNOSIS — G43.009 MIGRAINE WITHOUT AURA AND WITHOUT STATUS MIGRAINOSUS, NOT INTRACTABLE: ICD-10-CM

## 2024-03-06 NOTE — TELEPHONE ENCOUNTER
Rep from a law firm calling to check the status of medical records that were requested by her office for the patient. Rep has been given the number to our Medical Records Department for further assistance

## 2024-03-26 ENCOUNTER — HOSPITAL ENCOUNTER (OUTPATIENT)
Dept: RADIOLOGY | Facility: CLINIC | Age: 64
Discharge: HOME | End: 2024-03-26
Payer: COMMERCIAL

## 2024-03-26 VITALS
TEMPERATURE: 98.6 F | DIASTOLIC BLOOD PRESSURE: 64 MMHG | SYSTOLIC BLOOD PRESSURE: 100 MMHG | OXYGEN SATURATION: 98 % | HEART RATE: 66 BPM | RESPIRATION RATE: 20 BRPM

## 2024-03-26 DIAGNOSIS — M51.16 INTERVERTEBRAL DISC DISORDER WITH RADICULOPATHY OF LUMBAR REGION: ICD-10-CM

## 2024-03-26 PROCEDURE — 64483 NJX AA&/STRD TFRM EPI L/S 1: CPT | Performed by: ANESTHESIOLOGY

## 2024-03-26 PROCEDURE — 64484 NJX AA&/STRD TFRM EPI L/S EA: CPT | Performed by: ANESTHESIOLOGY

## 2024-03-26 RX ORDER — METHYLPREDNISOLONE ACETATE 80 MG/ML
80 INJECTION, SUSPENSION INTRA-ARTICULAR; INTRALESIONAL; INTRAMUSCULAR; PARENTERAL; SOFT TISSUE ONCE
Status: COMPLETED | OUTPATIENT
Start: 2024-03-26 | End: 2024-03-26

## 2024-03-26 RX ORDER — BUPIVACAINE HCL/PF 2.5 MG/ML
2 VIAL (ML) INJECTION ONCE
Status: COMPLETED | OUTPATIENT
Start: 2024-03-26 | End: 2024-03-26

## 2024-03-26 RX ORDER — 0.9 % SODIUM CHLORIDE 0.9 %
4 VIAL (ML) INJECTION ONCE
Status: DISCONTINUED | OUTPATIENT
Start: 2024-03-26 | End: 2024-03-26

## 2024-03-26 RX ORDER — LIDOCAINE HYDROCHLORIDE 10 MG/ML
8 INJECTION, SOLUTION EPIDURAL; INFILTRATION; INTRACAUDAL; PERINEURAL ONCE
Status: COMPLETED | OUTPATIENT
Start: 2024-03-26 | End: 2024-03-26

## 2024-03-26 RX ADMIN — BUPIVACAINE HYDROCHLORIDE 2 ML: 2.5 INJECTION, SOLUTION EPIDURAL; INFILTRATION; INTRACAUDAL at 10:46

## 2024-03-26 RX ADMIN — LIDOCAINE HYDROCHLORIDE 8 ML: 10 INJECTION, SOLUTION EPIDURAL; INFILTRATION; INTRACAUDAL; PERINEURAL at 10:44

## 2024-03-26 RX ADMIN — METHYLPREDNISOLONE ACETATE 80 MG: 80 INJECTION, SUSPENSION INTRA-ARTICULAR; INTRALESIONAL; INTRAMUSCULAR; PARENTERAL; SOFT TISSUE at 10:46

## 2024-03-26 RX ADMIN — IOHEXOL 1 ML: 300 INJECTION, SOLUTION INTRAVENOUS at 10:46
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
## 2024-03-26 NOTE — DISCHARGE INSTR - LAB
Epidural Steroid Injection   WHAT YOU NEED TO KNOW:   An epidural steroid injection (DINORAH) is a procedure to inject steroid medicine into the epidural space. The epidural space is between your spinal cord and vertebrae. Steroids reduce inflammation and fluid buildup in your spine that may be causing pain. You may be given pain medicine along with the steroids.          ACTIVITY  Do not drive or operate machinery today.  No strenuous activity today - bending, lifting, etc.  You may resume normal activites starting tomorrow - start slowly and as tolerated.  You may shower today, but no tub baths or hot tubs.  You may have numbness for several hours from the local anesthetic. Please use caution and common sense, especially with weight-bearing activities.    CARE OF THE INJECTION SITE  If you have soreness or pain, apply ice to the area today (20 minutes on/20 minutes off).  Starting tomorrow, you may use warm, moist heat or ice if needed.  You may have an increase or change in your discomfort for 36-48 hours after your treatment.  Apply ice and continue with any pain medication you have been prescribed.  Notify the [facility] if you have any of the following: redness, drainage, swelling, headache, stiff neck or fever above 100°F.    SPECIAL INSTRUCTIONS  Our office will contact you in approximately 7 days for a progress report.    MEDICATIONS  Continue to take all routine medications.  Our office may have instructed you to hold some medications.    As no general anesthesia was used in today's procedure, you should not experience any side effects related to anesthesia.     If you are diabetic, the steroids used in today's injection may temporarily increase your blood sugar levels after the first few days after your injection. Please keep a close eye on your sugars and alert the doctor who manages your diabetes if your sugars are significantly high from your baseline or you are symptomatic.     If you have a  problem specifically related to your procedure, please call our office at [phone].  Problems not related to your procedure should be directed to your primary care physician.

## 2024-04-01 ENCOUNTER — TELEPHONE (OUTPATIENT)
Age: 64
End: 2024-04-01

## 2024-04-01 ENCOUNTER — PROCEDURE VISIT (OUTPATIENT)
Age: 64
End: 2024-04-01
Payer: COMMERCIAL

## 2024-04-01 VITALS
HEART RATE: 70 BPM | DIASTOLIC BLOOD PRESSURE: 69 MMHG | BODY MASS INDEX: 25.83 KG/M2 | SYSTOLIC BLOOD PRESSURE: 107 MMHG | WEIGHT: 155 LBS | HEIGHT: 65 IN

## 2024-04-01 DIAGNOSIS — M54.2 NECK PAIN: Primary | ICD-10-CM

## 2024-04-01 DIAGNOSIS — M79.18 MYOFASCIAL PAIN: ICD-10-CM

## 2024-04-01 DIAGNOSIS — M54.41 ACUTE BILATERAL LOW BACK PAIN WITH RIGHT-SIDED SCIATICA: ICD-10-CM

## 2024-04-01 PROCEDURE — 98941 CHIROPRACT MANJ 3-4 REGIONS: CPT | Performed by: CHIROPRACTOR

## 2024-04-02 ENCOUNTER — TELEPHONE (OUTPATIENT)
Dept: RADIOLOGY | Facility: MEDICAL CENTER | Age: 64
End: 2024-04-02

## 2024-04-04 ENCOUNTER — TELEPHONE (OUTPATIENT)
Age: 64
End: 2024-04-04

## 2024-04-08 NOTE — PROGRESS NOTES
"      HPI:    Pat is in for treatment of neck, upper back pain, and mid back spasm.  In addition she has low back pain and a \"weakness feeling into the right leg\".  She has recently had a lumbar epidural injection.  She has some headaches as well.    VPAS  3 Neck  VPAS  5  Right leg    The following portions of the patient's history were reviewed and updated as appropriate: allergies, current medications, past family history, past medical history, past social history, past surgical history, and problem list.    Review of Systems    Physical Exam:  There is  segmental dysfunction T6-T7    Active triggers of myofascial involvement bilateral trapezii, rhomboid, levator scapula musculature joint dysfunction C5-C6    Pelvic obliquity is noted today with an elevated right versus left innominate internal rotation of the right innominate on sacrum paralumbar hypertonicity noted remaining exam stable joint dysfunction right SI L5-S1 motion unit.    Assessment:   Diagnosis ICD-10-CM Associated Orders   1. Neck pain  M54.2       2. Myofascial pain  M79.18       3. Acute bilateral low back pain with right-sided sciatica  M54.41                 Treatment: 89012      Manipulation provided to the C5 level via seated stairstepping technique well-tolerated.  Manipulation right innominate, sacrum, L5 via Velasquez drop maneuver this did alleviate pelvic obliquity and was well-tolerated.  Manipulation to T4, well tolerated.      Discussion:  Continue flexion-based stretching daily icing see her back for follow-up.  "

## 2024-04-15 ENCOUNTER — PROCEDURE VISIT (OUTPATIENT)
Age: 64
End: 2024-04-15
Payer: COMMERCIAL

## 2024-04-15 VITALS
HEART RATE: 68 BPM | SYSTOLIC BLOOD PRESSURE: 107 MMHG | HEIGHT: 65 IN | WEIGHT: 155 LBS | DIASTOLIC BLOOD PRESSURE: 74 MMHG | BODY MASS INDEX: 25.83 KG/M2

## 2024-04-15 DIAGNOSIS — M54.41 ACUTE BILATERAL LOW BACK PAIN WITH RIGHT-SIDED SCIATICA: ICD-10-CM

## 2024-04-15 DIAGNOSIS — M54.2 NECK PAIN: Primary | ICD-10-CM

## 2024-04-15 DIAGNOSIS — M79.18 MYOFASCIAL PAIN: ICD-10-CM

## 2024-04-15 PROCEDURE — 98940 CHIROPRACT MANJ 1-2 REGIONS: CPT | Performed by: CHIROPRACTOR

## 2024-04-15 NOTE — PROGRESS NOTES
HPI:    Pat is in for treatment of neck, upper back pain, and mid back spasm.  Feels better than last visit low back feels well without pain.    VPAS  4-5       The following portions of the patient's history were reviewed and updated as appropriate: allergies, current medications, past family history, past medical history, past social history, past surgical history, and problem list.    Review of Systems    Physical Exam:  There is  segmental dysfunction T6-T7    Active triggers of myofascial involvement bilateral trapezii, rhomboid, levator scapula musculature joint dysfunction C5-C6        Assessment:   Diagnosis ICD-10-CM Associated Orders   1. Neck pain  M54.2       2. Myofascial pain  M79.18       3. Acute bilateral low back pain with right-sided sciatica  M54.41                 Treatment: 95800      Manipulation provided to the C5 level via seated stairstepping technique well-tolerated.   Manipulation to T4, well tolerated.      Discussion:  Continue flexion-based stretching daily icing see her back for follow-up.

## 2024-04-18 ENCOUNTER — TELEPHONE (OUTPATIENT)
Dept: PAIN MEDICINE | Facility: CLINIC | Age: 64
End: 2024-04-18

## 2024-04-18 NOTE — TELEPHONE ENCOUNTER
Spoke with patient, she would like to schedule a repeat Rt L4-L5 TFESI in August prior to her daughter's wedding.  Ok to schedule?

## 2024-04-25 DIAGNOSIS — E78.2 MIXED HYPERLIPIDEMIA: ICD-10-CM

## 2024-04-25 RX ORDER — ATORVASTATIN CALCIUM 10 MG/1
10 TABLET, FILM COATED ORAL
Qty: 90 TABLET | Refills: 3 | Status: SHIPPED | OUTPATIENT
Start: 2024-04-25

## 2024-04-25 NOTE — TELEPHONE ENCOUNTER
"----- Message from Christina Dangelo sent at 4/25/2024  9:11 AM EDT -----  Regarding: atorvastatin (LIPITOR) 10 mg tablet  Ina    Odalys called in needing to refill the following medication: atorvastatin (LIPITOR) 10 mg tablet     When attempting to refill, I received an error message of:   \"Order can not be E-prescribed; fix the stated issue or change the class to print Invalid items: Provider\"    I'm not able to select any provider or edit within the requested script.    Odalys informed me that she has been without this medication for over a week and needs the script to be filled.     Please assist in getting her medication refilled.     Thank you!      "

## 2024-04-29 ENCOUNTER — PROCEDURE VISIT (OUTPATIENT)
Age: 64
End: 2024-04-29
Payer: COMMERCIAL

## 2024-04-29 VITALS
HEIGHT: 65 IN | SYSTOLIC BLOOD PRESSURE: 110 MMHG | DIASTOLIC BLOOD PRESSURE: 74 MMHG | BODY MASS INDEX: 25.83 KG/M2 | HEART RATE: 72 BPM | WEIGHT: 155 LBS

## 2024-04-29 DIAGNOSIS — M54.6 ACUTE BILATERAL THORACIC BACK PAIN: ICD-10-CM

## 2024-04-29 DIAGNOSIS — M79.18 MYOFASCIAL PAIN: ICD-10-CM

## 2024-04-29 DIAGNOSIS — M54.41 ACUTE BILATERAL LOW BACK PAIN WITH RIGHT-SIDED SCIATICA: ICD-10-CM

## 2024-04-29 DIAGNOSIS — M54.2 NECK PAIN: Primary | ICD-10-CM

## 2024-04-29 DIAGNOSIS — M54.12 CERVICAL RADICULOPATHY: ICD-10-CM

## 2024-04-29 PROCEDURE — 97140 MANUAL THERAPY 1/> REGIONS: CPT | Performed by: CHIROPRACTOR

## 2024-04-29 PROCEDURE — 98941 CHIROPRACT MANJ 3-4 REGIONS: CPT | Performed by: CHIROPRACTOR

## 2024-04-29 NOTE — PROGRESS NOTES
HPI:    Pat is in for treatment of neck, upper back pain, and mid back spasm.  Feels better than last visit low back feels well with some right leg pain..    VPAS  3-5       The following portions of the patient's history were reviewed and updated as appropriate: allergies, current medications, past family history, past medical history, past social history, past surgical history, and problem list.    Review of Systems    Physical Exam:  There is  segmental dysfunction T6-T7    Active triggers of myofascial involvement bilateral trapezii, rhomboid, levator scapula musculature joint dysfunction C5-C6    Pelvic obliquity and leg length inequality biomechanical joint dysfunction present right SI L5-S1 motion units.        Assessment:   Diagnosis ICD-10-CM Associated Orders   1. Neck pain  M54.2       2. Myofascial pain  M79.18       3. Acute bilateral low back pain with right-sided sciatica  M54.41       4. Acute bilateral thoracic back pain  M54.6       5. Cervical radiculopathy  M54.12                 Treatment: 33852      Manipulation provided to the C5 level via seated stairstepping technique well-tolerated.   Manipulation to T4, well tolerated.  Manipulation to the right innominate, sacrum, L5 via Velasquez drop.  Well-tolerated.      Therapeutic myofascial release utilizing Graston technique to the bilateral shoulder girdles.  I used GT 2, GT 3, and GT for instruments.  Scap thoracic mobilizations performed patient tolerated procedure well 12-minute procedure.      Discussion:  Continue flexion-based stretching daily icing see her back for follow-up.

## 2024-04-30 ENCOUNTER — TELEPHONE (OUTPATIENT)
Age: 64
End: 2024-04-30

## 2024-04-30 NOTE — TELEPHONE ENCOUNTER
PT called in inquiring if her medical records have been provided to her [employer] rep?     Found note dated: 03/26/24 that we have issued the rep the medical records number to contact.     Issued medical records number to pt.

## 2024-05-13 ENCOUNTER — TELEPHONE (OUTPATIENT)
Age: 64
End: 2024-05-13

## 2024-05-13 NOTE — TELEPHONE ENCOUNTER
Patient was calling, she was originally was scheduled for a 6mnt follow up today. She did cancel it, she is questioning if the 6 mnt follow up was needed or if she would be okay to wait to come later in the year for her physical?     She is also wondering when she should get her bloodwork drawn?     She would like a call back about when to schedule or if she should reschedule.     Thank you.

## 2024-05-13 NOTE — DISCHARGE INSTR - LAB
Epidural Steroid Injection   WHAT YOU NEED TO KNOW:   An epidural steroid injection (DINORAH) is a procedure to inject steroid medicine into the epidural space  The epidural space is between your spinal cord and vertebrae  Steroids reduce inflammation and fluid buildup in your spine that may be causing pain  You may be given pain medicine along with the steroids  ACTIVITY  Do not drive or operate machinery today  No strenuous activity today - bending, lifting, etc   You may resume normal activites starting tomorrow - start slowly and as tolerated  You may shower today, but no tub baths or hot tubs  You may have numbness for several hours from the local anesthetic  Please use caution and common sense, especially with weight-bearing activities  CARE OF THE INJECTION SITE  If you have soreness or pain, apply ice to the area today (20 minutes on/20 minutes off)  Starting tomorrow, you may use warm, moist heat or ice if needed  You may have an increase or change in your discomfort for 36-48 hours after your treatment  Apply ice and continue with any pain medication you have been prescribed  Notify the [facility] if you have any of the following: redness, drainage, swelling, headache, stiff neck or fever above 100°F     SPECIAL INSTRUCTIONS  Our office will contact you in approximately 7 days for a progress report  MEDICATIONS  Continue to take all routine medications  Our office may have instructed you to hold some medications  As no general anesthesia was used in today's procedure, you should not experience any side effects related to anesthesia  If you are diabetic, the steroids used in today's injection may temporarily increase your blood sugar levels after the first few days after your injection  Please keep a close eye on your sugars and alert the doctor who manages your diabetes if your sugars are significantly high from your baseline or you are symptomatic       If you have a problem specifically related to your procedure, please call our office at [phone]  Problems not related to your procedure should be directed to your primary care physician  Name band;

## 2024-05-23 ENCOUNTER — PROCEDURE VISIT (OUTPATIENT)
Age: 64
End: 2024-05-23
Payer: COMMERCIAL

## 2024-05-23 VITALS
HEART RATE: 80 BPM | SYSTOLIC BLOOD PRESSURE: 103 MMHG | HEIGHT: 65 IN | BODY MASS INDEX: 25.83 KG/M2 | WEIGHT: 155 LBS | DIASTOLIC BLOOD PRESSURE: 81 MMHG

## 2024-05-23 DIAGNOSIS — M54.2 NECK PAIN: Primary | ICD-10-CM

## 2024-05-23 DIAGNOSIS — M54.6 ACUTE BILATERAL THORACIC BACK PAIN: ICD-10-CM

## 2024-05-23 DIAGNOSIS — M79.18 MYOFASCIAL PAIN: ICD-10-CM

## 2024-05-23 DIAGNOSIS — M54.41 ACUTE BILATERAL LOW BACK PAIN WITH RIGHT-SIDED SCIATICA: ICD-10-CM

## 2024-05-23 PROCEDURE — 98941 CHIROPRACT MANJ 3-4 REGIONS: CPT | Performed by: CHIROPRACTOR

## 2024-05-23 PROCEDURE — 97140 MANUAL THERAPY 1/> REGIONS: CPT | Performed by: CHIROPRACTOR
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
## 2024-05-31 DIAGNOSIS — G43.009 MIGRAINE WITHOUT AURA AND WITHOUT STATUS MIGRAINOSUS, NOT INTRACTABLE: ICD-10-CM

## 2024-05-31 DIAGNOSIS — R51.9 NONINTRACTABLE HEADACHE, UNSPECIFIED CHRONICITY PATTERN, UNSPECIFIED HEADACHE TYPE: ICD-10-CM

## 2024-06-03 RX ORDER — BUTALBITAL, ACETAMINOPHEN AND CAFFEINE 300; 40; 50 MG/1; MG/1; MG/1
50 CAPSULE ORAL EVERY 6 HOURS PRN
Qty: 30 CAPSULE | Refills: 0 | Status: SHIPPED | OUTPATIENT
Start: 2024-06-03

## 2024-06-11 ENCOUNTER — TELEPHONE (OUTPATIENT)
Dept: NEUROLOGY | Facility: CLINIC | Age: 64
End: 2024-06-11
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
## 2024-06-28 ENCOUNTER — TELEPHONE (OUTPATIENT)
Age: 64
End: 2024-06-28

## 2024-06-28 ENCOUNTER — APPOINTMENT (OUTPATIENT)
Age: 64
End: 2024-06-28
Payer: COMMERCIAL

## 2024-06-28 DIAGNOSIS — Z00.00 WELL ADULT EXAM: ICD-10-CM

## 2024-06-28 DIAGNOSIS — R35.0 URINARY FREQUENCY: Primary | ICD-10-CM

## 2024-06-28 DIAGNOSIS — R35.0 URINARY FREQUENCY: ICD-10-CM

## 2024-06-28 DIAGNOSIS — E55.9 VITAMIN D INSUFFICIENCY: ICD-10-CM

## 2024-06-28 LAB
25(OH)D3 SERPL-MCNC: 38.7 NG/ML (ref 30–100)
ALBUMIN SERPL BCG-MCNC: 4.5 G/DL (ref 3.5–5)
ALP SERPL-CCNC: 58 U/L (ref 34–104)
ALT SERPL W P-5'-P-CCNC: 21 U/L (ref 7–52)
ANION GAP SERPL CALCULATED.3IONS-SCNC: 6 MMOL/L (ref 4–13)
AST SERPL W P-5'-P-CCNC: 20 U/L (ref 13–39)
BASOPHILS # BLD AUTO: 0.03 THOUSANDS/ÂΜL (ref 0–0.1)
BASOPHILS NFR BLD AUTO: 1 % (ref 0–1)
BILIRUB SERPL-MCNC: 0.38 MG/DL (ref 0.2–1)
BILIRUB UR QL STRIP: NEGATIVE
BUN SERPL-MCNC: 15 MG/DL (ref 5–25)
CALCIUM SERPL-MCNC: 9.1 MG/DL (ref 8.4–10.2)
CHLORIDE SERPL-SCNC: 102 MMOL/L (ref 96–108)
CHOLEST SERPL-MCNC: 167 MG/DL
CLARITY UR: CLEAR
CO2 SERPL-SCNC: 29 MMOL/L (ref 21–32)
COLOR UR: COLORLESS
CREAT SERPL-MCNC: 0.75 MG/DL (ref 0.6–1.3)
EOSINOPHIL # BLD AUTO: 0.18 THOUSAND/ÂΜL (ref 0–0.61)
EOSINOPHIL NFR BLD AUTO: 4 % (ref 0–6)
ERYTHROCYTE [DISTWIDTH] IN BLOOD BY AUTOMATED COUNT: 12.2 % (ref 11.6–15.1)
EST. AVERAGE GLUCOSE BLD GHB EST-MCNC: 103 MG/DL
GFR SERPL CREATININE-BSD FRML MDRD: 84 ML/MIN/1.73SQ M
GLUCOSE P FAST SERPL-MCNC: 92 MG/DL (ref 65–99)
GLUCOSE UR STRIP-MCNC: NEGATIVE MG/DL
HBA1C MFR BLD: 5.2 %
HCT VFR BLD AUTO: 42.3 % (ref 34.8–46.1)
HDLC SERPL-MCNC: 63 MG/DL
HGB BLD-MCNC: 14 G/DL (ref 11.5–15.4)
HGB UR QL STRIP.AUTO: NEGATIVE
IMM GRANULOCYTES # BLD AUTO: 0.01 THOUSAND/UL (ref 0–0.2)
IMM GRANULOCYTES NFR BLD AUTO: 0 % (ref 0–2)
KETONES UR STRIP-MCNC: NEGATIVE MG/DL
LDLC SERPL CALC-MCNC: 89 MG/DL (ref 0–100)
LEUKOCYTE ESTERASE UR QL STRIP: NEGATIVE
LYMPHOCYTES # BLD AUTO: 2.12 THOUSANDS/ÂΜL (ref 0.6–4.47)
LYMPHOCYTES NFR BLD AUTO: 52 % (ref 14–44)
MCH RBC QN AUTO: 30.6 PG (ref 26.8–34.3)
MCHC RBC AUTO-ENTMCNC: 33.1 G/DL (ref 31.4–37.4)
MCV RBC AUTO: 93 FL (ref 82–98)
MONOCYTES # BLD AUTO: 0.49 THOUSAND/ÂΜL (ref 0.17–1.22)
MONOCYTES NFR BLD AUTO: 12 % (ref 4–12)
NEUTROPHILS # BLD AUTO: 1.26 THOUSANDS/ÂΜL (ref 1.85–7.62)
NEUTS SEG NFR BLD AUTO: 31 % (ref 43–75)
NITRITE UR QL STRIP: NEGATIVE
NONHDLC SERPL-MCNC: 104 MG/DL
NRBC BLD AUTO-RTO: 0 /100 WBCS
PH UR STRIP.AUTO: 7.5 [PH]
PLATELET # BLD AUTO: 282 THOUSANDS/UL (ref 149–390)
PMV BLD AUTO: 9.9 FL (ref 8.9–12.7)
POTASSIUM SERPL-SCNC: 4 MMOL/L (ref 3.5–5.3)
PROT SERPL-MCNC: 7.1 G/DL (ref 6.4–8.4)
PROT UR STRIP-MCNC: NEGATIVE MG/DL
RBC # BLD AUTO: 4.57 MILLION/UL (ref 3.81–5.12)
SODIUM SERPL-SCNC: 137 MMOL/L (ref 135–147)
SP GR UR STRIP.AUTO: 1.01 (ref 1–1.03)
TRIGL SERPL-MCNC: 74 MG/DL
TSH SERPL DL<=0.05 MIU/L-ACNC: 2.78 UIU/ML (ref 0.45–4.5)
UROBILINOGEN UR STRIP-ACNC: <2 MG/DL
WBC # BLD AUTO: 4.09 THOUSAND/UL (ref 4.31–10.16)

## 2024-06-28 PROCEDURE — 81003 URINALYSIS AUTO W/O SCOPE: CPT

## 2024-06-28 PROCEDURE — 80053 COMPREHEN METABOLIC PANEL: CPT

## 2024-06-28 PROCEDURE — 82306 VITAMIN D 25 HYDROXY: CPT

## 2024-06-28 PROCEDURE — 85025 COMPLETE CBC W/AUTO DIFF WBC: CPT

## 2024-06-28 PROCEDURE — 80061 LIPID PANEL: CPT

## 2024-06-28 PROCEDURE — 83036 HEMOGLOBIN GLYCOSYLATED A1C: CPT

## 2024-06-28 PROCEDURE — 84443 ASSAY THYROID STIM HORMONE: CPT

## 2024-06-28 PROCEDURE — 36415 COLL VENOUS BLD VENIPUNCTURE: CPT

## 2024-06-28 NOTE — TELEPHONE ENCOUNTER
Patient called stating she went for blood work today and included urine. She said every time she goes for blood work they have always ordered urine as well. Please upload in MyChart and notify patient

## 2024-06-28 NOTE — TELEPHONE ENCOUNTER
Duplicate request, patients my chart message sent to you also. Are you able to order urine testing please? Thanks!

## 2024-07-01 ENCOUNTER — TELEPHONE (OUTPATIENT)
Age: 64
End: 2024-07-01

## 2024-07-01 DIAGNOSIS — D72.819 LEUKOPENIA, UNSPECIFIED TYPE: Primary | ICD-10-CM

## 2024-07-01 NOTE — TELEPHONE ENCOUNTER
----- Message from Melanie Quintana PA-C sent at 7/1/2024  9:08 AM EDT -----  Labs look pretty good, but your white count is low.  Are you sick?  I am going to place an order to repeat your CBC in 3 weeks to ensure your white count has come back up.

## 2024-07-02 ENCOUNTER — TELEPHONE (OUTPATIENT)
Age: 64
End: 2024-07-02

## 2024-07-22 DIAGNOSIS — R51.9 NONINTRACTABLE HEADACHE, UNSPECIFIED CHRONICITY PATTERN, UNSPECIFIED HEADACHE TYPE: ICD-10-CM

## 2024-07-22 DIAGNOSIS — I10 HYPERTENSION, ESSENTIAL: ICD-10-CM

## 2024-07-22 DIAGNOSIS — E78.2 MIXED HYPERLIPIDEMIA: ICD-10-CM

## 2024-07-22 RX ORDER — ATORVASTATIN CALCIUM 10 MG/1
10 TABLET, FILM COATED ORAL
Qty: 90 TABLET | Refills: 3 | Status: SHIPPED | OUTPATIENT
Start: 2024-07-22

## 2024-07-22 RX ORDER — LOSARTAN POTASSIUM 50 MG/1
50 TABLET ORAL DAILY
Qty: 90 TABLET | Refills: 0 | Status: SHIPPED | OUTPATIENT
Start: 2024-07-22

## 2024-07-22 RX ORDER — BUTALBITAL, ACETAMINOPHEN AND CAFFEINE 300; 40; 50 MG/1; MG/1; MG/1
50 CAPSULE ORAL EVERY 6 HOURS PRN
Qty: 30 CAPSULE | Refills: 0 | Status: SHIPPED | OUTPATIENT
Start: 2024-07-22

## 2024-07-23 ENCOUNTER — PROCEDURE VISIT (OUTPATIENT)
Age: 64
End: 2024-07-23
Payer: COMMERCIAL

## 2024-07-23 VITALS
WEIGHT: 155 LBS | DIASTOLIC BLOOD PRESSURE: 75 MMHG | BODY MASS INDEX: 25.83 KG/M2 | SYSTOLIC BLOOD PRESSURE: 111 MMHG | HEART RATE: 78 BPM | HEIGHT: 65 IN

## 2024-07-23 DIAGNOSIS — M54.6 ACUTE BILATERAL THORACIC BACK PAIN: ICD-10-CM

## 2024-07-23 DIAGNOSIS — M79.18 MYOFASCIAL PAIN: ICD-10-CM

## 2024-07-23 DIAGNOSIS — M54.2 NECK PAIN: Primary | ICD-10-CM

## 2024-07-23 DIAGNOSIS — M54.41 ACUTE BILATERAL LOW BACK PAIN WITH RIGHT-SIDED SCIATICA: ICD-10-CM

## 2024-07-23 DIAGNOSIS — M54.12 CERVICAL RADICULOPATHY: ICD-10-CM

## 2024-07-23 PROCEDURE — 98941 CHIROPRACT MANJ 3-4 REGIONS: CPT | Performed by: CHIROPRACTOR

## 2024-07-23 NOTE — PROGRESS NOTES
HPI:    Pat is in for treatment of neck, upper back pain, and mid back spasm.  Has low back pain on the right.    VPAS  4-5      The following portions of the patient's history were reviewed and updated as appropriate: allergies, current medications, past family history, past medical history, past social history, past surgical history, and problem list.    Review of Systems    Physical Exam:  There is  segmental dysfunction T6-T7    Active triggers of myofascial involvement bilateral trapezii, rhomboid, levator scapula musculature joint dysfunction C5-C6    Pelvic obliquity and leg length inequality biomechanical joint dysfunction present right SI L5-S1 motion units.        Assessment:   Diagnosis ICD-10-CM Associated Orders   1. Neck pain  M54.2       2. Myofascial pain  M79.18       3. Acute bilateral low back pain with right-sided sciatica  M54.41       4. Acute bilateral thoracic back pain  M54.6       5. Cervical radiculopathy  M54.12                 Treatment: 19271      Manipulation provided to the C5 level via seated stairstepping technique well-tolerated.   Manipulation to T4, well tolerated.  Manipulation to the right innominate, sacrum, L5 via Velasquez drop.  Well-tolerated.      Discussion:  Continue flexion-based stretching daily icing see her back for follow-up.

## 2024-08-13 ENCOUNTER — HOSPITAL ENCOUNTER (OUTPATIENT)
Dept: RADIOLOGY | Facility: CLINIC | Age: 64
Discharge: HOME | End: 2024-08-13
Payer: COMMERCIAL

## 2024-08-13 VITALS
TEMPERATURE: 98.1 F | RESPIRATION RATE: 20 BRPM | OXYGEN SATURATION: 96 % | DIASTOLIC BLOOD PRESSURE: 64 MMHG | HEART RATE: 65 BPM | SYSTOLIC BLOOD PRESSURE: 98 MMHG

## 2024-08-13 DIAGNOSIS — M51.16 INTERVERTEBRAL DISC DISORDER WITH RADICULOPATHY OF LUMBAR REGION: ICD-10-CM

## 2024-08-13 PROBLEM — I67.1 CEREBRAL ANEURYSM, NONRUPTURED: Status: ACTIVE | Noted: 2023-07-11

## 2024-08-13 PROCEDURE — 64484 NJX AA&/STRD TFRM EPI L/S EA: CPT | Performed by: ANESTHESIOLOGY

## 2024-08-13 PROCEDURE — 64483 NJX AA&/STRD TFRM EPI L/S 1: CPT | Performed by: ANESTHESIOLOGY

## 2024-08-13 RX ORDER — BUPIVACAINE HCL/PF 2.5 MG/ML
2 VIAL (ML) INJECTION ONCE
Status: COMPLETED | OUTPATIENT
Start: 2024-08-13 | End: 2024-08-13

## 2024-08-13 RX ORDER — 0.9 % SODIUM CHLORIDE 0.9 %
4 VIAL (ML) INJECTION ONCE
Status: COMPLETED | OUTPATIENT
Start: 2024-08-13 | End: 2024-08-13

## 2024-08-13 RX ORDER — METHYLPREDNISOLONE ACETATE 80 MG/ML
80 INJECTION, SUSPENSION INTRA-ARTICULAR; INTRALESIONAL; INTRAMUSCULAR; PARENTERAL; SOFT TISSUE ONCE
Status: COMPLETED | OUTPATIENT
Start: 2024-08-13 | End: 2024-08-13

## 2024-08-13 RX ADMIN — Medication 4 ML: at 09:44

## 2024-08-13 RX ADMIN — IOHEXOL 1 ML: 300 INJECTION, SOLUTION INTRAVENOUS at 09:46

## 2024-08-13 RX ADMIN — METHYLPREDNISOLONE ACETATE 80 MG: 80 INJECTION, SUSPENSION INTRA-ARTICULAR; INTRALESIONAL; INTRAMUSCULAR; PARENTERAL; SOFT TISSUE at 09:46

## 2024-08-13 RX ADMIN — BUPIVACAINE HYDROCHLORIDE 2 ML: 2.5 INJECTION, SOLUTION EPIDURAL; INFILTRATION; INTRACAUDAL at 09:46

## 2024-08-13 NOTE — DISCHARGE INSTR - LAB
Epidural Steroid Injection   WHAT YOU NEED TO KNOW:   An epidural steroid injection (DINORAH) is a procedure to inject steroid medicine into the epidural space. The epidural space is between your spinal cord and vertebrae. Steroids reduce inflammation and fluid buildup in your spine that may be causing pain. You may be given pain medicine along with the steroids.          ACTIVITY  Do not drive or operate machinery today.  No strenuous activity today - bending, lifting, etc.  You may resume normal activites starting tomorrow - start slowly and as tolerated.  You may shower today, but no tub baths or hot tubs.  You may have numbness for several hours from the local anesthetic. Please use caution and common sense, especially with weight-bearing activities.    CARE OF THE INJECTION SITE  If you have soreness or pain, apply ice to the area today (20 minutes on/20 minutes off).  Starting tomorrow, you may use warm, moist heat or ice if needed.  You may have an increase or change in your discomfort for 36-48 hours after your treatment.  Apply ice and continue with any pain medication you have been prescribed.  Notify the [facility] if you have any of the following: redness, drainage, swelling, headache, stiff neck or fever above 100°F.    SPECIAL INSTRUCTIONS  Our office will contact you in approximately 14 days for a progress report.    MEDICATIONS  Continue to take all routine medications.  Our office may have instructed you to hold some medications.    As no general anesthesia was used in today's procedure, you should not experience any side effects related to anesthesia.     If you are diabetic, the steroids used in today's injection may temporarily increase your blood sugar levels after the first few days after your injection. Please keep a close eye on your sugars and alert the doctor who manages your diabetes if your sugars are significantly high from your baseline or you are symptomatic.     If you have a  problem specifically related to your procedure, please call our office at [phone].  Problems not related to your procedure should be directed to your primary care physician.

## 2024-09-03 ENCOUNTER — APPOINTMENT (OUTPATIENT)
Dept: LAB | Facility: HOSPITAL | Age: 64
End: 2024-09-03
Payer: COMMERCIAL

## 2024-09-03 DIAGNOSIS — D72.819 LEUKOPENIA, UNSPECIFIED TYPE: ICD-10-CM

## 2024-09-03 LAB
BASOPHILS # BLD AUTO: 0.03 THOUSANDS/ÂΜL (ref 0–0.1)
BASOPHILS NFR BLD AUTO: 1 % (ref 0–1)
EOSINOPHIL # BLD AUTO: 0.21 THOUSAND/ÂΜL (ref 0–0.61)
EOSINOPHIL NFR BLD AUTO: 4 % (ref 0–6)
ERYTHROCYTE [DISTWIDTH] IN BLOOD BY AUTOMATED COUNT: 11.9 % (ref 11.6–15.1)
HCT VFR BLD AUTO: 42.3 % (ref 34.8–46.1)
HGB BLD-MCNC: 14 G/DL (ref 11.5–15.4)
IMM GRANULOCYTES # BLD AUTO: 0 THOUSAND/UL (ref 0–0.2)
IMM GRANULOCYTES NFR BLD AUTO: 0 % (ref 0–2)
LYMPHOCYTES # BLD AUTO: 2.74 THOUSANDS/ÂΜL (ref 0.6–4.47)
LYMPHOCYTES NFR BLD AUTO: 46 % (ref 14–44)
MCH RBC QN AUTO: 30.7 PG (ref 26.8–34.3)
MCHC RBC AUTO-ENTMCNC: 33.1 G/DL (ref 31.4–37.4)
MCV RBC AUTO: 93 FL (ref 82–98)
MONOCYTES # BLD AUTO: 0.6 THOUSAND/ÂΜL (ref 0.17–1.22)
MONOCYTES NFR BLD AUTO: 10 % (ref 4–12)
NEUTROPHILS # BLD AUTO: 2.25 THOUSANDS/ÂΜL (ref 1.85–7.62)
NEUTS SEG NFR BLD AUTO: 39 % (ref 43–75)
NRBC BLD AUTO-RTO: 0 /100 WBCS
PLATELET # BLD AUTO: 234 THOUSANDS/UL (ref 149–390)
PMV BLD AUTO: 8.8 FL (ref 8.9–12.7)
RBC # BLD AUTO: 4.56 MILLION/UL (ref 3.81–5.12)
WBC # BLD AUTO: 5.83 THOUSAND/UL (ref 4.31–10.16)

## 2024-09-03 PROCEDURE — 85025 COMPLETE CBC W/AUTO DIFF WBC: CPT

## 2024-09-03 PROCEDURE — 36415 COLL VENOUS BLD VENIPUNCTURE: CPT

## 2024-09-04 ENCOUNTER — TELEPHONE (OUTPATIENT)
Age: 64
End: 2024-09-04

## 2024-09-04 NOTE — TELEPHONE ENCOUNTER
----- Message from Melanie Quintana PA-C sent at 9/4/2024  8:26 AM EDT -----  White blood cells have corrected themselves.

## 2024-09-06 ENCOUNTER — TELEPHONE (OUTPATIENT)
Age: 64
End: 2024-09-06

## 2024-09-06 NOTE — TELEPHONE ENCOUNTER
Contacted patient for Talk Therapy  to verify needs of services in attempts to offer patient an appointment at Available Office. Writer verified Full Name, , Callback Number, Address, and Insurance. Writer spoke with pt who is interested in being scheduled.  Writer offered soonest available appts to pt, who decided locations were quite a distance from pt and requested to be removed from wait list.    1st call attempt

## 2024-10-07 DIAGNOSIS — E78.2 MIXED HYPERLIPIDEMIA: ICD-10-CM

## 2024-10-07 DIAGNOSIS — I10 HYPERTENSION, ESSENTIAL: ICD-10-CM

## 2024-10-08 ENCOUNTER — TELEPHONE (OUTPATIENT)
Age: 64
End: 2024-10-08

## 2024-10-08 DIAGNOSIS — M79.18 MYOFASCIAL PAIN SYNDROME: ICD-10-CM

## 2024-10-08 RX ORDER — METHOCARBAMOL 500 MG/1
500 TABLET, FILM COATED ORAL 3 TIMES DAILY PRN
Qty: 90 TABLET | Refills: 3 | Status: SHIPPED | OUTPATIENT
Start: 2024-10-08

## 2024-10-08 RX ORDER — LOSARTAN POTASSIUM 50 MG/1
50 TABLET ORAL DAILY
Qty: 90 TABLET | Refills: 1 | Status: SHIPPED | OUTPATIENT
Start: 2024-10-08

## 2024-10-08 RX ORDER — ATORVASTATIN CALCIUM 10 MG/1
10 TABLET, FILM COATED ORAL
Qty: 90 TABLET | Refills: 1 | Status: SHIPPED | OUTPATIENT
Start: 2024-10-08

## 2024-10-08 NOTE — TELEPHONE ENCOUNTER
Pt contacted Call Center requested refill of their medication.        Doctor Name: Isra      Medication Name:   methocarbamol (ROBAXIN)       Dosage of Med: 500 mg       Frequency of Med: Take 1 tablet (500 mg total) by mouth 3 (three) times a day as needed for muscle spasms       Remaining Medication: 0 left       Pharmacy and Location: [facility]         Pt. Preferred Callback Phone Number: [phone]      Thank you.

## 2024-10-08 NOTE — TELEPHONE ENCOUNTER
S/w pt, states she takes robaxin sparingly, typically at bedtime and it is helpful. States she has been more active recently and is experiencing tightness in neck and mid back. No medication remaining at this time, uses [facility]. Please advise.

## 2024-10-31 ENCOUNTER — TELEPHONE (OUTPATIENT)
Age: 64
End: 2024-10-31

## 2024-11-01 NOTE — TELEPHONE ENCOUNTER
Patient's  called to switch their appointments as it worked better for their schedules.  Thiago is now scheduled on 11/4 at 3:20 p.m., patient is now scheduled on 11/4 at 1pm, however, time slot is for a TCM, could someone please change that as I am blocked from doing so?    Thank you!

## 2024-11-04 ENCOUNTER — TELEPHONE (OUTPATIENT)
Age: 64
End: 2024-11-04

## 2024-11-04 ENCOUNTER — OFFICE VISIT (OUTPATIENT)
Age: 64
End: 2024-11-04
Payer: COMMERCIAL

## 2024-11-04 VITALS
HEIGHT: 65 IN | SYSTOLIC BLOOD PRESSURE: 130 MMHG | HEART RATE: 78 BPM | WEIGHT: 153 LBS | OXYGEN SATURATION: 98 % | RESPIRATION RATE: 16 BRPM | BODY MASS INDEX: 25.49 KG/M2 | DIASTOLIC BLOOD PRESSURE: 76 MMHG

## 2024-11-04 DIAGNOSIS — E78.2 MIXED HYPERLIPIDEMIA: ICD-10-CM

## 2024-11-04 DIAGNOSIS — I51.7 LVH (LEFT VENTRICULAR HYPERTROPHY): ICD-10-CM

## 2024-11-04 DIAGNOSIS — Q04.6 PORENCEPHALIC CYST (HCC): ICD-10-CM

## 2024-11-04 DIAGNOSIS — I10 HYPERTENSION, ESSENTIAL: ICD-10-CM

## 2024-11-04 DIAGNOSIS — M51.16 INTERVERTEBRAL DISC DISORDER WITH RADICULOPATHY OF LUMBAR REGION: ICD-10-CM

## 2024-11-04 DIAGNOSIS — I67.1 CEREBRAL ANEURYSM, NONRUPTURED: ICD-10-CM

## 2024-11-04 DIAGNOSIS — J32.9 CHRONIC SINUSITIS, UNSPECIFIED LOCATION: ICD-10-CM

## 2024-11-04 DIAGNOSIS — Z23 ENCOUNTER FOR IMMUNIZATION: ICD-10-CM

## 2024-11-04 DIAGNOSIS — G47.09 OTHER INSOMNIA: ICD-10-CM

## 2024-11-04 DIAGNOSIS — Z00.00 WELL ADULT EXAM: Primary | ICD-10-CM

## 2024-11-04 PROCEDURE — 90472 IMMUNIZATION ADMIN EACH ADD: CPT | Performed by: INTERNAL MEDICINE

## 2024-11-04 PROCEDURE — 99397 PER PM REEVAL EST PAT 65+ YR: CPT | Performed by: INTERNAL MEDICINE

## 2024-11-04 PROCEDURE — 90471 IMMUNIZATION ADMIN: CPT | Performed by: INTERNAL MEDICINE

## 2024-11-04 PROCEDURE — 90662 IIV NO PRSV INCREASED AG IM: CPT | Performed by: INTERNAL MEDICINE

## 2024-11-04 PROCEDURE — 90677 PCV20 VACCINE IM: CPT | Performed by: INTERNAL MEDICINE

## 2024-11-04 RX ORDER — TRAZODONE HYDROCHLORIDE 100 MG/1
50 TABLET ORAL
Qty: 90 TABLET | Refills: 0 | Status: SHIPPED | OUTPATIENT
Start: 2024-11-04

## 2024-11-04 NOTE — PROGRESS NOTES
Assessment/Plan:    Diagnoses and all orders for this visit:    Well adult exam  -     Basic metabolic panel; Future  -     CBC and differential; Future  -     Lipid panel; Future  -     Hemoglobin A1C; Future  -     Hepatic function panel; Future  -     TSH, 3rd generation with Free T4 reflex; Future    Encounter for immunization  -     influenza vaccine, high-dose, PF 0.5 mL (Fluzone High Dose)  -     Pneumococcal Conjugate Vaccine 20-valent (Pcv20)    Other insomnia  -     traZODone (DESYREL) 100 mg tablet; Take 0.5 tablets (50 mg total) by mouth daily at bedtime as needed for sleep    Hypertension, essential    Cerebral aneurysm, nonruptured    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HCC)    Intervertebral disc disorder with radiculopathy of lumbar region    Mixed hyperlipidemia    LVH (left ventricular hypertrophy)  -     Echo complete w/ contrast if indicated; Future    Chronic sinusitis, unspecified location  -     Ambulatory Referral to Otolaryngology; Future              Patient Instructions   Lab data reviewed in detail and compared to prior    Hyperlipidemia at goal on atorvastatin    Hypertension at goal on losartan    LVH-likely related to hypertension.  Will check echo in December and follow accordingly.    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  No need for COVID-vaccine given recent infection    Routine follow-up after labs in 6 months, sooner as needed.    Subjective:      Patient ID: Odalys Salinas is a 64 y.o. female    Yearly well visit  Feeling well  Had covid in August, mild sx.  Retired, working per [employer]  Planning to ski [place]  Swimming 1 mi tiw, elliptical, core exercises, walking, something at least 6 d per week.    HTN/LVH-taking losartan, f/b Dr. Dixon  Noted exertional chest tightness/sob walking stairs at altitude, but no sx w/ daily  exercise.   HPL-tolerating atorvastatin.   Allergy/chronic sinusitis-needs new ENT referral.  Probable porencephalic cyst-f/b neurosurg w/ MRI q 2 yrs.  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           Current Outpatient Medications:     albuterol (PROVENTIL HFA,VENTOLIN HFA) 90 mcg/act inhaler, INHALE 2 PUFFS EVERY 6 HOURS AS NEEDED FOR WHEEZING, Disp: 18 g, Rfl: 3    atorvastatin (LIPITOR) 10 mg tablet, Take 1 tablet (10 mg total) by mouth daily at bedtime, Disp: 90 tablet, Rfl: 1    b complex vitamins capsule, Take 1 capsule by mouth daily, Disp: , Rfl:     Butalbital-APAP-Caffeine (Fioricet) -40 MG CAPS, Take 50 mg by mouth every 6 (six) hours as needed (headache), Disp: 30 capsule, Rfl: 0    cholecalciferol (VITAMIN D3) 1,000 units tablet, Take 1,000 Units by mouth daily, Disp: , Rfl:     fluticasone (FLONASE) 50 mcg/act nasal spray, 2 SPRAYS INTO EACH NOSTRIL DAILY, Disp: 16 mL, Rfl: 3    losartan (COZAAR) 50 mg tablet, Take 1 tablet (50 mg total) by mouth daily, Disp: 90 tablet, Rfl: 1    methocarbamol (ROBAXIN) 500 mg tablet, Take 1 tablet (500 mg total) by mouth 3 (three) times a day as needed for muscle spasms, Disp: 90 tablet, Rfl: 3    montelukast (SINGULAIR) 10 mg tablet, Take 10 mg by mouth daily at bedtime, Disp: , Rfl:     Multiple Vitamin (multivitamin) tablet, Take 1 tablet by mouth daily, Disp: , Rfl:     Omega-3 Fatty Acids (fish oil) 1,000 mg, Take 1,000 mg by mouth daily, Disp: , Rfl:     Probiotic Product (PROBIOTIC-10 PO), Take 1 tablet by mouth in the morning, Disp: , Rfl:     traZODone (DESYREL) 100 mg tablet, Take 0.5 tablets (50 mg total) by mouth daily at bedtime as needed for sleep, Disp: 90 tablet, Rfl: 0    zinc gluconate 50 mg tablet, Take 50 mg by mouth daily, Disp: , Rfl:     calcium gluconate 500 mg tablet, Take 1,000 mg by mouth daily, Disp: , Rfl:     No results found for this or any previous visit (from the past 1008  hour(s)).    The following portions of the patient's history were reviewed and updated as appropriate: allergies, current medications, past family history, past medical history, past social history, past surgical history and problem list.     Review of Systems   Constitutional:  Negative for appetite change, chills, diaphoresis, fatigue, fever and unexpected weight change.   HENT:  Negative for congestion, hearing loss and rhinorrhea.    Eyes:  Negative for visual disturbance.   Respiratory:  Negative for cough, chest tightness, shortness of breath and wheezing.    Cardiovascular:  Negative for chest pain, palpitations and leg swelling.   Gastrointestinal:  Negative for abdominal pain and blood in stool.   Endocrine: Negative for cold intolerance, heat intolerance, polydipsia and polyuria.   Genitourinary:  Negative for difficulty urinating, dysuria, frequency and urgency.   Musculoskeletal:  Negative for arthralgias and myalgias.   Skin:  Negative for rash.   Neurological:  Negative for dizziness, weakness, light-headedness and headaches.   Hematological:  Does not bruise/bleed easily.   Psychiatric/Behavioral:  Negative for dysphoric mood and sleep disturbance.          Objective:      Vitals:    11/04/24 1254   BP: 130/76   Pulse: 78   Resp: 16   SpO2: 98%          Physical Exam  Constitutional:       Appearance: She is well-developed.   HENT:      Head: Normocephalic and atraumatic.      Nose: Nose normal.   Eyes:      General: No scleral icterus.     Conjunctiva/sclera: Conjunctivae normal.      Pupils: Pupils are equal, round, and reactive to light.   Neck:      Thyroid: No thyromegaly.      Vascular: No JVD.      Trachea: No tracheal deviation.   Cardiovascular:      Rate and Rhythm: Normal rate and regular rhythm.      Heart sounds: No murmur heard.     No friction rub. No gallop.   Pulmonary:      Effort: Pulmonary effort is normal. No respiratory distress.      Breath sounds: Normal breath sounds. No  wheezing or rales.   Musculoskeletal:         General: No deformity.      Cervical back: Normal range of motion and neck supple.   Lymphadenopathy:      Cervical: No cervical adenopathy.   Skin:     General: Skin is warm and dry.      Coloration: Skin is not pale.      Findings: No erythema or rash.   Neurological:      Mental Status: She is alert and oriented to person, place, and time.      Cranial Nerves: No cranial nerve deficit.   Psychiatric:         Behavior: Behavior normal.         Thought Content: Thought content normal.         Judgment: Judgment normal.

## 2024-11-04 NOTE — PATIENT INSTRUCTIONS
Lab data reviewed in detail and compared to prior    Hyperlipidemia at goal on atorvastatin    Hypertension at goal on losartan    LVH-likely related to hypertension.  Will check echo in December and follow accordingly.    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  No need for COVID-vaccine given recent infection    Routine follow-up after labs in 6 months, sooner as needed.

## 2024-11-11 ENCOUNTER — TELEPHONE (OUTPATIENT)
Age: 64
End: 2024-11-11

## 2024-11-11 NOTE — TELEPHONE ENCOUNTER
Pt reports insurance company wants PCP to send last office visit notes and why pt is getting an echo done. Pt wasn't sure if this was normal and if PCP would receive this request from the insurance company or not via fax.  PCP please call pt back to further advise on this matter.

## 2024-11-27 ENCOUNTER — TELEPHONE (OUTPATIENT)
Age: 64
End: 2024-11-27

## 2024-11-27 NOTE — TELEPHONE ENCOUNTER
Caller: Isidoro    Doctor: Isra    Reason for call: Pt has injection in August and would like to schedule another one if possible.     Please assist.     Call back#: [phone]

## 2024-12-02 ENCOUNTER — HOSPITAL ENCOUNTER (OUTPATIENT)
Dept: NON INVASIVE DIAGNOSTICS | Facility: CLINIC | Age: 64
Discharge: HOME | End: 2024-12-02
Payer: COMMERCIAL

## 2024-12-02 ENCOUNTER — RESULTS FOLLOW-UP (OUTPATIENT)
Dept: OTHER | Facility: HOSPITAL | Age: 64
End: 2024-12-02

## 2024-12-02 VITALS
BODY MASS INDEX: 25.49 KG/M2 | DIASTOLIC BLOOD PRESSURE: 76 MMHG | WEIGHT: 153 LBS | SYSTOLIC BLOOD PRESSURE: 130 MMHG | HEART RATE: 58 BPM | HEIGHT: 65 IN

## 2024-12-02 DIAGNOSIS — I51.7 LVH (LEFT VENTRICULAR HYPERTROPHY): ICD-10-CM

## 2024-12-02 LAB
AORTIC ROOT: 3.2 CM
APICAL FOUR CHAMBER EJECTION FRACTION: 59 %
ASCENDING AORTA: 3.4 CM
AV LVOT PEAK GRADIENT: 4 MMHG
AV PEAK GRADIENT: 7 MMHG
BSA FOR ECHO PROCEDURE: 1.77 M2
E WAVE DECELERATION TIME: 155 MS
E/A RATIO: 0.88
FRACTIONAL SHORTENING: 34 (ref 28–44)
INTERVENTRICULAR SEPTUM IN DIASTOLE (PARASTERNAL SHORT AXIS VIEW): 1.1 CM
INTERVENTRICULAR SEPTUM: 1.1 CM (ref 0.6–1.1)
LAAS-AP2: 11.9 CM2
LAAS-AP4: 14.7 CM2
LEFT ATRIUM AREA SYSTOLE SINGLE PLANE A4C: 14.6 CM2
LEFT ATRIUM SIZE: 3.1 CM
LEFT ATRIUM VOLUME (MOD BIPLANE): 37 ML
LEFT ATRIUM VOLUME INDEX (MOD BIPLANE): 20.9 ML/M2
LEFT INTERNAL DIMENSION IN SYSTOLE: 3.1 CM (ref 2.1–4)
LEFT VENTRICULAR INTERNAL DIMENSION IN DIASTOLE: 4.7 CM (ref 3.5–6)
LEFT VENTRICULAR POSTERIOR WALL IN END DIASTOLE: 0.9 CM
LEFT VENTRICULAR STROKE VOLUME: 61 ML
LVSV (TEICH): 61 ML
MV E'TISSUE VEL-SEP: 8 CM/S
MV PEAK A VEL: 0.84 M/S
MV PEAK E VEL: 74 CM/S
MV STENOSIS PRESSURE HALF TIME: 45 MS
MV VALVE AREA P 1/2 METHOD: 4.89
RIGHT ATRIUM AREA SYSTOLE A4C: 14.6 CM2
RIGHT VENTRICLE ID DIMENSION: 2.7 CM
SL CV LEFT ATRIUM LENGTH A2C: 3.7 CM
SL CV LV EF: 55
SL CV PED ECHO LEFT VENTRICLE DIASTOLIC VOLUME (MOD BIPLANE) 2D: 100 ML
SL CV PED ECHO LEFT VENTRICLE SYSTOLIC VOLUME (MOD BIPLANE) 2D: 39 ML
TR MAX PG: 26 MMHG
TR PEAK VELOCITY: 2.6 M/S
TRICUSPID ANNULAR PLANE SYSTOLIC EXCURSION: 2.3 CM
TRICUSPID VALVE PEAK REGURGITATION VELOCITY: 2.57 M/S

## 2024-12-02 PROCEDURE — 93306 TTE W/DOPPLER COMPLETE: CPT

## 2024-12-02 PROCEDURE — 93306 TTE W/DOPPLER COMPLETE: CPT | Performed by: INTERNAL MEDICINE

## 2024-12-20 ENCOUNTER — OFFICE VISIT (OUTPATIENT)
Dept: PAIN MEDICINE | Facility: CLINIC | Age: 64
End: 2024-12-20
Payer: COMMERCIAL

## 2024-12-20 VITALS — WEIGHT: 153.44 LBS | BODY MASS INDEX: 25.56 KG/M2 | HEIGHT: 65 IN

## 2024-12-20 DIAGNOSIS — M47.816 LUMBAR SPONDYLOSIS: ICD-10-CM

## 2024-12-20 DIAGNOSIS — M48.062 SPINAL STENOSIS OF LUMBAR REGION WITH NEUROGENIC CLAUDICATION: ICD-10-CM

## 2024-12-20 DIAGNOSIS — G89.4 CHRONIC PAIN SYNDROME: ICD-10-CM

## 2024-12-20 DIAGNOSIS — M51.16 INTERVERTEBRAL DISC DISORDER WITH RADICULOPATHY OF LUMBAR REGION: Primary | ICD-10-CM

## 2024-12-20 PROCEDURE — 99214 OFFICE O/P EST MOD 30 MIN: CPT
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
## 2025-01-14 ENCOUNTER — HOSPITAL ENCOUNTER (OUTPATIENT)
Dept: RADIOLOGY | Facility: CLINIC | Age: 65
Discharge: HOME | End: 2025-01-14
Payer: COMMERCIAL

## 2025-01-14 ENCOUNTER — TELEPHONE (OUTPATIENT)
Dept: RADIOLOGY | Facility: CLINIC | Age: 65
End: 2025-01-14

## 2025-01-14 VITALS
SYSTOLIC BLOOD PRESSURE: 111 MMHG | HEART RATE: 60 BPM | TEMPERATURE: 97.3 F | RESPIRATION RATE: 20 BRPM | DIASTOLIC BLOOD PRESSURE: 73 MMHG | OXYGEN SATURATION: 98 %

## 2025-01-14 DIAGNOSIS — M79.18 MYOFASCIAL PAIN SYNDROME: ICD-10-CM

## 2025-01-14 DIAGNOSIS — M51.16 INTERVERTEBRAL DISC DISORDER WITH RADICULOPATHY OF LUMBAR REGION: ICD-10-CM

## 2025-01-14 DIAGNOSIS — M54.16 LUMBAR RADICULOPATHY: Primary | ICD-10-CM

## 2025-01-14 PROCEDURE — 64483 NJX AA&/STRD TFRM EPI L/S 1: CPT | Performed by: ANESTHESIOLOGY

## 2025-01-14 PROCEDURE — 64484 NJX AA&/STRD TFRM EPI L/S EA: CPT | Performed by: ANESTHESIOLOGY

## 2025-01-14 RX ORDER — METHYLPREDNISOLONE ACETATE 80 MG/ML
80 INJECTION, SUSPENSION INTRA-ARTICULAR; INTRALESIONAL; INTRAMUSCULAR; PARENTERAL; SOFT TISSUE ONCE
Status: COMPLETED | OUTPATIENT
Start: 2025-01-14 | End: 2025-01-14

## 2025-01-14 RX ORDER — TIZANIDINE 2 MG/1
2 TABLET ORAL 2 TIMES DAILY PRN
Qty: 60 TABLET | Refills: 1 | Status: SHIPPED | OUTPATIENT
Start: 2025-01-14

## 2025-01-14 RX ORDER — BUPIVACAINE HCL/PF 2.5 MG/ML
2 VIAL (ML) INJECTION ONCE
Status: COMPLETED | OUTPATIENT
Start: 2025-01-14 | End: 2025-01-14

## 2025-01-14 RX ORDER — 0.9 % SODIUM CHLORIDE 0.9 %
4 VIAL (ML) INJECTION ONCE
Status: COMPLETED | OUTPATIENT
Start: 2025-01-14 | End: 2025-01-14

## 2025-01-14 RX ADMIN — METHYLPREDNISOLONE ACETATE 80 MG: 80 INJECTION, SUSPENSION INTRA-ARTICULAR; INTRALESIONAL; INTRAMUSCULAR; SOFT TISSUE at 09:18

## 2025-01-14 RX ADMIN — BUPIVACAINE HYDROCHLORIDE 2 ML: 2.5 INJECTION, SOLUTION EPIDURAL; INFILTRATION; INTRACAUDAL at 09:18

## 2025-01-14 RX ADMIN — IOHEXOL 1 ML: 300 INJECTION, SOLUTION INTRAVENOUS at 09:18

## 2025-01-14 RX ADMIN — LIDOCAINE HYDROCHLORIDE 4 ML: 20 INJECTION, SOLUTION EPIDURAL; INFILTRATION; INTRACAUDAL at 09:14

## 2025-01-14 RX ADMIN — Medication 4 ML: at 09:14
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
## 2025-01-14 NOTE — DISCHARGE INSTR - LAB
Epidural Steroid Injection   WHAT YOU NEED TO KNOW:   An epidural steroid injection (DINORAH) is a procedure to inject steroid medicine into the epidural space. The epidural space is between your spinal cord and vertebrae. Steroids reduce inflammation and fluid buildup in your spine that may be causing pain. You may be given pain medicine along with the steroids.          ACTIVITY  Do not drive or operate machinery today.  No strenuous activity today - bending, lifting, etc.  You may resume normal activites starting tomorrow - start slowly and as tolerated.  You may shower today, but no tub baths or hot tubs.  You may have numbness for several hours from the local anesthetic. Please use caution and common sense, especially with weight-bearing activities.    CARE OF THE INJECTION SITE  If you have soreness or pain, apply ice to the area today (20 minutes on/20 minutes off).  Starting tomorrow, you may use warm, moist heat or ice if needed.  You may have an increase or change in your discomfort for 36-48 hours after your treatment.  Apply ice and continue with any pain medication you have been prescribed.  Notify the [facility] if you have any of the following: redness, drainage, swelling, headache, stiff neck or fever above 100°F.    SPECIAL INSTRUCTIONS  Our office will contact you in approximately 14 days for a progress report.    MEDICATIONS  Continue to take all routine medications.  Our office may have instructed you to hold some medications.    As no general anesthesia was used in today's procedure, you should not experience any side effects related to anesthesia.     If you are diabetic, the steroids used in today's injection may temporarily increase your blood sugar levels after the first few days after your injection. Please keep a close eye on your sugars and alert the doctor who manages your diabetes if your sugars are significantly high from your baseline or you are symptomatic.     If you have a  problem specifically related to your procedure, please call our office at [phone].  Problems not related to your procedure should be directed to your primary care physician.

## 2025-01-23 ENCOUNTER — HOSPITAL ENCOUNTER (OUTPATIENT)
Age: 65
Discharge: HOME | End: 2025-01-23
Payer: COMMERCIAL

## 2025-01-23 VITALS — BODY MASS INDEX: 25.49 KG/M2 | WEIGHT: 153 LBS | HEIGHT: 65 IN

## 2025-01-23 DIAGNOSIS — I10 HYPERTENSION, ESSENTIAL: ICD-10-CM

## 2025-01-23 DIAGNOSIS — E78.2 MIXED HYPERLIPIDEMIA: ICD-10-CM

## 2025-01-23 DIAGNOSIS — Z12.31 ENCOUNTER FOR SCREENING MAMMOGRAM FOR MALIGNANT NEOPLASM OF BREAST: ICD-10-CM

## 2025-01-23 PROCEDURE — 77063 BREAST TOMOSYNTHESIS BI: CPT

## 2025-01-23 PROCEDURE — 77067 SCR MAMMO BI INCL CAD: CPT

## 2025-01-23 RX ORDER — LOSARTAN POTASSIUM 50 MG/1
50 TABLET ORAL DAILY
Qty: 90 TABLET | Refills: 0 | Status: SHIPPED | OUTPATIENT
Start: 2025-01-23

## 2025-01-23 RX ORDER — ATORVASTATIN CALCIUM 10 MG/1
10 TABLET, FILM COATED ORAL
Qty: 90 TABLET | Refills: 0 | Status: SHIPPED | OUTPATIENT
Start: 2025-01-23

## 2025-01-28 ENCOUNTER — TELEPHONE (OUTPATIENT)
Dept: OBGYN CLINIC | Facility: MEDICAL CENTER | Age: 65
End: 2025-01-28

## 2025-02-05 ENCOUNTER — HOSPITAL ENCOUNTER (OUTPATIENT)
Dept: MAMMOGRAPHY | Facility: CLINIC | Age: 65
Discharge: HOME | End: 2025-02-05
Payer: COMMERCIAL

## 2025-02-05 ENCOUNTER — HOSPITAL ENCOUNTER (OUTPATIENT)
Dept: ULTRASOUND IMAGING | Facility: CLINIC | Age: 65
Discharge: HOME | End: 2025-02-05
Payer: COMMERCIAL

## 2025-02-05 VITALS — BODY MASS INDEX: 25.49 KG/M2 | HEIGHT: 65 IN | WEIGHT: 153 LBS

## 2025-02-05 DIAGNOSIS — R92.8 ABNORMAL MAMMOGRAM: ICD-10-CM

## 2025-02-05 PROCEDURE — 76642 ULTRASOUND BREAST LIMITED: CPT

## 2025-02-05 PROCEDURE — G0279 TOMOSYNTHESIS, MAMMO: HCPCS

## 2025-02-05 PROCEDURE — 77066 DX MAMMO INCL CAD BI: CPT

## 2025-03-15 DIAGNOSIS — R51.9 NONINTRACTABLE HEADACHE, UNSPECIFIED CHRONICITY PATTERN, UNSPECIFIED HEADACHE TYPE: ICD-10-CM

## 2025-03-17 RX ORDER — BUTALBITAL, ACETAMINOPHEN AND CAFFEINE 300; 40; 50 MG/1; MG/1; MG/1
50 CAPSULE ORAL EVERY 6 HOURS PRN
Qty: 30 CAPSULE | Refills: 0 | Status: SHIPPED | OUTPATIENT
Start: 2025-03-17

## 2025-03-21 ENCOUNTER — APPOINTMENT (OUTPATIENT)
Age: 65
End: 2025-03-21
Payer: COMMERCIAL

## 2025-03-21 DIAGNOSIS — E78.2 MIXED HYPERLIPIDEMIA: ICD-10-CM

## 2025-03-21 DIAGNOSIS — Z00.00 WELL ADULT EXAM: ICD-10-CM

## 2025-03-21 LAB
ALBUMIN SERPL BCG-MCNC: 4.8 G/DL (ref 3.5–5)
ALP SERPL-CCNC: 59 U/L (ref 34–104)
ALT SERPL W P-5'-P-CCNC: 18 U/L (ref 7–52)
ANION GAP SERPL CALCULATED.3IONS-SCNC: 9 MMOL/L (ref 4–13)
AST SERPL W P-5'-P-CCNC: 18 U/L (ref 13–39)
BASOPHILS # BLD AUTO: 0.02 THOUSANDS/ÂΜL (ref 0–0.1)
BASOPHILS NFR BLD AUTO: 1 % (ref 0–1)
BILIRUB DIRECT SERPL-MCNC: 0.14 MG/DL (ref 0–0.2)
BILIRUB SERPL-MCNC: 0.72 MG/DL (ref 0.2–1)
BUN SERPL-MCNC: 17 MG/DL (ref 5–25)
CALCIUM SERPL-MCNC: 9.6 MG/DL (ref 8.4–10.2)
CHLORIDE SERPL-SCNC: 102 MMOL/L (ref 96–108)
CHOLEST SERPL-MCNC: 185 MG/DL (ref ?–200)
CO2 SERPL-SCNC: 28 MMOL/L (ref 21–32)
CREAT SERPL-MCNC: 0.73 MG/DL (ref 0.6–1.3)
EOSINOPHIL # BLD AUTO: 0.2 THOUSAND/ÂΜL (ref 0–0.61)
EOSINOPHIL NFR BLD AUTO: 5 % (ref 0–6)
ERYTHROCYTE [DISTWIDTH] IN BLOOD BY AUTOMATED COUNT: 11.9 % (ref 11.6–15.1)
EST. AVERAGE GLUCOSE BLD GHB EST-MCNC: 103 MG/DL
GFR SERPL CREATININE-BSD FRML MDRD: 86 ML/MIN/1.73SQ M
GLUCOSE P FAST SERPL-MCNC: 97 MG/DL (ref 65–99)
HBA1C MFR BLD: 5.2 %
HCT VFR BLD AUTO: 43.6 % (ref 34.8–46.1)
HDLC SERPL-MCNC: 79 MG/DL
HGB BLD-MCNC: 13.9 G/DL (ref 11.5–15.4)
IMM GRANULOCYTES # BLD AUTO: 0 THOUSAND/UL (ref 0–0.2)
IMM GRANULOCYTES NFR BLD AUTO: 0 % (ref 0–2)
LDLC SERPL CALC-MCNC: 91 MG/DL (ref 0–100)
LYMPHOCYTES # BLD AUTO: 2.22 THOUSANDS/ÂΜL (ref 0.6–4.47)
LYMPHOCYTES NFR BLD AUTO: 56 % (ref 14–44)
MCH RBC QN AUTO: 29.8 PG (ref 26.8–34.3)
MCHC RBC AUTO-ENTMCNC: 31.9 G/DL (ref 31.4–37.4)
MCV RBC AUTO: 93 FL (ref 82–98)
MONOCYTES # BLD AUTO: 0.45 THOUSAND/ÂΜL (ref 0.17–1.22)
MONOCYTES NFR BLD AUTO: 12 % (ref 4–12)
NEUTROPHILS # BLD AUTO: 1.02 THOUSANDS/ÂΜL (ref 1.85–7.62)
NEUTS SEG NFR BLD AUTO: 26 % (ref 43–75)
NONHDLC SERPL-MCNC: 106 MG/DL
NRBC BLD AUTO-RTO: 0 /100 WBCS
PLATELET # BLD AUTO: 244 THOUSANDS/UL (ref 149–390)
PMV BLD AUTO: 10.4 FL (ref 8.9–12.7)
POTASSIUM SERPL-SCNC: 4.3 MMOL/L (ref 3.5–5.3)
PROT SERPL-MCNC: 7.3 G/DL (ref 6.4–8.4)
RBC # BLD AUTO: 4.67 MILLION/UL (ref 3.81–5.12)
SODIUM SERPL-SCNC: 139 MMOL/L (ref 135–147)
TRIGL SERPL-MCNC: 77 MG/DL (ref ?–150)
TSH SERPL DL<=0.05 MIU/L-ACNC: 3.44 UIU/ML (ref 0.45–4.5)
WBC # BLD AUTO: 3.91 THOUSAND/UL (ref 4.31–10.16)

## 2025-03-21 PROCEDURE — 83695 ASSAY OF LIPOPROTEIN(A): CPT

## 2025-03-21 PROCEDURE — 80076 HEPATIC FUNCTION PANEL: CPT

## 2025-03-21 PROCEDURE — 80048 BASIC METABOLIC PNL TOTAL CA: CPT

## 2025-03-21 PROCEDURE — 84443 ASSAY THYROID STIM HORMONE: CPT

## 2025-03-21 PROCEDURE — 36415 COLL VENOUS BLD VENIPUNCTURE: CPT

## 2025-03-21 PROCEDURE — 85025 COMPLETE CBC W/AUTO DIFF WBC: CPT

## 2025-03-21 PROCEDURE — 83036 HEMOGLOBIN GLYCOSYLATED A1C: CPT

## 2025-03-21 PROCEDURE — 80061 LIPID PANEL: CPT

## 2025-03-24 ENCOUNTER — OFFICE VISIT (OUTPATIENT)
Age: 65
End: 2025-03-24
Payer: COMMERCIAL

## 2025-03-24 VITALS
WEIGHT: 155 LBS | OXYGEN SATURATION: 98 % | SYSTOLIC BLOOD PRESSURE: 114 MMHG | BODY MASS INDEX: 25.83 KG/M2 | DIASTOLIC BLOOD PRESSURE: 78 MMHG | HEIGHT: 65 IN | HEART RATE: 70 BPM

## 2025-03-24 DIAGNOSIS — I51.7 LVH (LEFT VENTRICULAR HYPERTROPHY): ICD-10-CM

## 2025-03-24 DIAGNOSIS — M79.605 PAIN IN BOTH LOWER EXTREMITIES: ICD-10-CM

## 2025-03-24 DIAGNOSIS — F32.5 MAJOR DEPRESSIVE DISORDER WITH SINGLE EPISODE, IN REMISSION (HCC): ICD-10-CM

## 2025-03-24 DIAGNOSIS — I10 HYPERTENSION, ESSENTIAL: Primary | ICD-10-CM

## 2025-03-24 DIAGNOSIS — J32.9 CHRONIC SINUSITIS, UNSPECIFIED LOCATION: ICD-10-CM

## 2025-03-24 DIAGNOSIS — M79.604 PAIN IN BOTH LOWER EXTREMITIES: ICD-10-CM

## 2025-03-24 DIAGNOSIS — E78.2 MIXED HYPERLIPIDEMIA: ICD-10-CM

## 2025-03-24 LAB — LPA SERPL-SCNC: 83.8 NMOL/L

## 2025-03-24 PROCEDURE — 99214 OFFICE O/P EST MOD 30 MIN: CPT | Performed by: INTERNAL MEDICINE

## 2025-03-24 RX ORDER — ATORVASTATIN CALCIUM 20 MG/1
20 TABLET, FILM COATED ORAL
Qty: 90 TABLET | Refills: 1 | Status: SHIPPED | OUTPATIENT
Start: 2025-03-24

## 2025-03-24 RX ORDER — CETIRIZINE HYDROCHLORIDE 10 MG/1
10 TABLET, CHEWABLE ORAL DAILY
COMMUNITY

## 2025-03-24 RX ORDER — MONTELUKAST SODIUM 10 MG/1
10 TABLET ORAL
Qty: 90 TABLET | Refills: 1 | Status: SHIPPED | OUTPATIENT
Start: 2025-03-24

## 2025-03-24 RX ORDER — GABAPENTIN 300 MG/1
300 CAPSULE ORAL DAILY
Qty: 90 CAPSULE | Refills: 1 | Status: SHIPPED | OUTPATIENT
Start: 2025-03-24

## 2025-03-24 NOTE — PROGRESS NOTES
Name: Odalys Salinas      : 1960      MRN: [id number]  Encounter Provider: Chandrakant Fleming MD  Encounter Date: 3/24/2025   Encounter department: [facility] [address]  :  Assessment & Plan  LVH (left ventricular hypertrophy)       history of LVH  Echo completed in December  EF55% w/ mild concentric hypertrophy, normal SF, and grade 1 diastolic dysfunction   Continue to follow with cardiology  Will increase Atorvastatin to 20 mg qd  Hypertension, essential    Orders:    Comprehensive metabolic panel; Future    CBC and differential; Future    BP stable  Continuing with Losartan 50 mg qd    Major depressive disorder with single episode, in remission (HCC)         Denies any depression  Will continue off medications at current time    Mixed hyperlipidemia    Orders:    TSH, 3rd generation with Free T4 reflex; Future    Comprehensive metabolic panel; Future    Lipid panel; Future    CBC and differential; Future    atorvastatin (LIPITOR) 20 mg tablet; Take 1 tablet (20 mg total) by mouth daily at bedtime    Lab Results   Component Value Date    CHOLESTEROL 185 2025    TRIG 77 2025    HDL 79 2025    LDLCALC 91 2025     Currently on Atorvastatin 10 mg  Increase to 20 mg qd    Pain in both lower extremities    Orders:    gabapentin (Neurontin) 300 mg capsule; Take 1 capsule (300 mg total) by mouth daily    She describes an ache and pain in her BLE.  States she needs to get up into many different yoga positions to feel better before she gets back into bed  Uses Tizanidine currently  Differential includes restless leg syndrome  Will recommend her to discontinue Tizanidine   Instead, will recommend her to start on Gabapentin 300 mg daily      Chronic sinusitis, unspecified location    Orders:    montelukast (SINGULAIR) 10 mg tablet; Take 1 tablet (10 mg total) by mouth daily at bedtime           History of Present Illness   Yearly well visit  Feeling well  Retired,  "working per [employer]  Went skiing in [place] earlier this year  Moving to [place] later this summer  Swimming 1 mi tiw, elliptical, core exercises, walking, something at least 6 d per week.    HTN/LVH-taking losartan, f/b Dr. Dixon  Noted exertional chest tightness/sob walking stairs at altitude, but no sx w/ daily exercise.   HPL-tolerating atorvastatin.   Allergy/chronic sinusitis-Continuing with flonase and zyrtec  Probable porencephalic cyst-f/b neurosurg w/ MRI q 2 yrs.  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       Review of Systems   Constitutional:  Negative for chills, fatigue and fever.   Respiratory:  Negative for cough, shortness of breath and wheezing.    Cardiovascular:  Negative for chest pain, palpitations and leg swelling.   Gastrointestinal:  Negative for abdominal pain, blood in stool, constipation, diarrhea, nausea and vomiting.   Genitourinary:  Negative for dysuria, frequency, hematuria and urgency.   Musculoskeletal:  Positive for back pain. Negative for myalgias and neck pain.   Skin:  Negative for rash.   Neurological:  Positive for light-headedness (orthostatic). Negative for dizziness and headaches.   Psychiatric/Behavioral:  Negative for confusion.        Objective   /78 (BP Location: Left arm, Patient Position: Sitting, Cuff Size: Adult)   Pulse 70   Ht 5' 5\" (1.651 m)   Wt 70.3 kg (155 lb)   LMP 01/01/2015 (Within Years)   SpO2 98%   BMI 25.79 kg/m²      Physical Exam  Vitals reviewed.   Constitutional:       General: She is not in acute distress.     Appearance: Normal appearance. She is not ill-appearing, toxic-appearing or diaphoretic.   HENT:      Head: Normocephalic and atraumatic.      Mouth/Throat:      Mouth: Mucous membranes are moist.   Eyes:      General: No scleral icterus.     Conjunctiva/sclera: Conjunctivae normal.   Cardiovascular:      Rate and Rhythm: Normal rate and regular rhythm.      Pulses: Normal pulses. "      Heart sounds: No murmur heard.     No friction rub. No gallop.   Pulmonary:      Effort: Pulmonary effort is normal. No respiratory distress.      Breath sounds: No wheezing, rhonchi or rales.   Abdominal:      General: Bowel sounds are normal. There is no distension.      Tenderness: There is no abdominal tenderness. There is no guarding or rebound.   Musculoskeletal:         General: No swelling. Normal range of motion.      Cervical back: Normal range of motion and neck supple.      Right lower leg: No edema.      Left lower leg: No edema.   Skin:     General: Skin is warm.      Coloration: Skin is not jaundiced.   Neurological:      General: No focal deficit present.      Mental Status: She is alert and oriented to person, place, and time.      Cranial Nerves: No cranial nerve deficit.   Psychiatric:         Mood and Affect: Mood normal.         Behavior: Behavior normal.         Thought Content: Thought content normal.         Judgment: Judgment normal.

## 2025-03-24 NOTE — ASSESSMENT & PLAN NOTE
Orders:    Comprehensive metabolic panel; Future    CBC and differential; Future    BP stable  Continuing with Losartan 50 mg qd

## 2025-03-24 NOTE — ASSESSMENT & PLAN NOTE
Orders:    TSH, 3rd generation with Free T4 reflex; Future    Comprehensive metabolic panel; Future    Lipid panel; Future    CBC and differential; Future    atorvastatin (LIPITOR) 20 mg tablet; Take 1 tablet (20 mg total) by mouth daily at bedtime    Lab Results   Component Value Date    CHOLESTEROL 185 03/21/2025    TRIG 77 03/21/2025    HDL 79 03/21/2025    LDLCALC 91 03/21/2025     Currently on Atorvastatin 10 mg  Increase to 20 mg qd

## 2025-03-24 NOTE — ASSESSMENT & PLAN NOTE
history of LVH  Echo completed in December  EF55% w/ mild concentric hypertrophy, normal SF, and grade 1 diastolic dysfunction   Continue to follow with cardiology  Will increase Atorvastatin to 20 mg qd

## 2025-03-25 ENCOUNTER — TELEPHONE (OUTPATIENT)
Age: 65
End: 2025-03-25

## 2025-03-25 DIAGNOSIS — I67.1 CEREBRAL ANEURYSM, NONRUPTURED: Primary | ICD-10-CM

## 2025-03-25 DIAGNOSIS — Q04.6 PORENCEPHALIC CYST (HCC): ICD-10-CM

## 2025-03-25 NOTE — TELEPHONE ENCOUNTER
PT CALLED STATING SHE IS IN THE PROCESS OF RELOCATING TO [place] AND WILL NEED TO MOVE HER MEDICAL RECORDS WITH [facility] AS WELL AS HAVE HER CURRENT IMAGING ORDERS FOR 2 YEAR F/U SENT TO [facility] FOR COMPLETION.    PLEASE FAX UPDATED IMAGING STUDY ORDERS:    MRI BRAIN W WO  MRA HEAD WO  CTA HEAD W WO    TO [facility], [phone]    PT STATES SHE ALSO NEEDS A REFERRAL FROM hospitals FOR A TRANSFER OF CARE SENT TO [facility] [phone].    CONFIRMED MEDICAL RECORDS PHONE NUMBER [phone] AND ADVISED PT TO CALL TO REQUEST IMAGING DISCS AND TO HAVE RECORDS RELEASED TO [facility].    PT WAS APPRECIATIVE

## 2025-03-27 NOTE — TELEPHONE ENCOUNTER
Patient relocating [place] in need of referral to establish with Neurosurgeon locally. Placed referral, MR [facility] will fax as requested.

## 2025-04-08 ENCOUNTER — APPOINTMENT (EMERGENCY)
Dept: CT IMAGING | Facility: HOSPITAL | Age: 65
End: 2025-04-08
Payer: COMMERCIAL

## 2025-04-08 ENCOUNTER — TELEPHONE (OUTPATIENT)
Age: 65
End: 2025-04-08

## 2025-04-08 ENCOUNTER — HOSPITAL ENCOUNTER (EMERGENCY)
Facility: HOSPITAL | Age: 65
Discharge: HOME | End: 2025-04-08
Attending: EMERGENCY MEDICINE
Payer: COMMERCIAL

## 2025-04-08 VITALS
RESPIRATION RATE: 21 BRPM | OXYGEN SATURATION: 99 % | HEART RATE: 67 BPM | TEMPERATURE: 97.5 F | SYSTOLIC BLOOD PRESSURE: 118 MMHG | DIASTOLIC BLOOD PRESSURE: 74 MMHG

## 2025-04-08 DIAGNOSIS — R42 VERTIGO: Primary | ICD-10-CM

## 2025-04-08 LAB
ALBUMIN SERPL BCG-MCNC: 4.7 G/DL (ref 3.5–5)
ALP SERPL-CCNC: 63 U/L (ref 34–104)
ALT SERPL W P-5'-P-CCNC: 17 U/L (ref 7–52)
ANION GAP SERPL CALCULATED.3IONS-SCNC: 6 MMOL/L (ref 4–13)
AST SERPL W P-5'-P-CCNC: 18 U/L (ref 13–39)
ATRIAL RATE: 51 BPM
BASOPHILS # BLD AUTO: 0.02 THOUSANDS/ÂΜL (ref 0–0.1)
BASOPHILS NFR BLD AUTO: 0 % (ref 0–1)
BILIRUB SERPL-MCNC: 0.62 MG/DL (ref 0.2–1)
BUN SERPL-MCNC: 14 MG/DL (ref 5–25)
CALCIUM SERPL-MCNC: 9.2 MG/DL (ref 8.4–10.2)
CARDIAC TROPONIN I PNL SERPL HS: 3 NG/L (ref ?–50)
CHLORIDE SERPL-SCNC: 103 MMOL/L (ref 96–108)
CO2 SERPL-SCNC: 29 MMOL/L (ref 21–32)
CREAT SERPL-MCNC: 0.7 MG/DL (ref 0.6–1.3)
EOSINOPHIL # BLD AUTO: 0.1 THOUSAND/ÂΜL (ref 0–0.61)
EOSINOPHIL NFR BLD AUTO: 2 % (ref 0–6)
ERYTHROCYTE [DISTWIDTH] IN BLOOD BY AUTOMATED COUNT: 11.9 % (ref 11.6–15.1)
GFR SERPL CREATININE-BSD FRML MDRD: 91 ML/MIN/1.73SQ M
GLUCOSE SERPL-MCNC: 117 MG/DL (ref 65–140)
HCT VFR BLD AUTO: 41.6 % (ref 34.8–46.1)
HGB BLD-MCNC: 13.9 G/DL (ref 11.5–15.4)
IMM GRANULOCYTES # BLD AUTO: 0.01 THOUSAND/UL (ref 0–0.2)
IMM GRANULOCYTES NFR BLD AUTO: 0 % (ref 0–2)
LYMPHOCYTES # BLD AUTO: 1.51 THOUSANDS/ÂΜL (ref 0.6–4.47)
LYMPHOCYTES NFR BLD AUTO: 31 % (ref 14–44)
MAGNESIUM SERPL-MCNC: 2.1 MG/DL (ref 1.9–2.7)
MCH RBC QN AUTO: 30.5 PG (ref 26.8–34.3)
MCHC RBC AUTO-ENTMCNC: 33.4 G/DL (ref 31.4–37.4)
MCV RBC AUTO: 91 FL (ref 82–98)
MONOCYTES # BLD AUTO: 0.42 THOUSAND/ÂΜL (ref 0.17–1.22)
MONOCYTES NFR BLD AUTO: 9 % (ref 4–12)
NEUTROPHILS # BLD AUTO: 2.79 THOUSANDS/ÂΜL (ref 1.85–7.62)
NEUTS SEG NFR BLD AUTO: 58 % (ref 43–75)
NRBC BLD AUTO-RTO: 0 /100 WBCS
P AXIS: 73 DEGREES
PLATELET # BLD AUTO: 229 THOUSANDS/UL (ref 149–390)
PMV BLD AUTO: 10 FL (ref 8.9–12.7)
POTASSIUM SERPL-SCNC: 4 MMOL/L (ref 3.5–5.3)
PR INTERVAL: 174 MS
PROT SERPL-MCNC: 7.3 G/DL (ref 6.4–8.4)
QRS AXIS: 70 DEGREES
QRSD INTERVAL: 90 MS
QT INTERVAL: 470 MS
QTC INTERVAL: 433 MS
RBC # BLD AUTO: 4.56 MILLION/UL (ref 3.81–5.12)
SODIUM SERPL-SCNC: 138 MMOL/L (ref 135–147)
T WAVE AXIS: 63 DEGREES
VENTRICULAR RATE: 51 BPM
WBC # BLD AUTO: 4.85 THOUSAND/UL (ref 4.31–10.16)

## 2025-04-08 PROCEDURE — 93005 ELECTROCARDIOGRAM TRACING: CPT

## 2025-04-08 PROCEDURE — 70496 CT ANGIOGRAPHY HEAD: CPT

## 2025-04-08 PROCEDURE — 93010 ELECTROCARDIOGRAM REPORT: CPT | Performed by: INTERNAL MEDICINE

## 2025-04-08 PROCEDURE — 96375 TX/PRO/DX INJ NEW DRUG ADDON: CPT

## 2025-04-08 PROCEDURE — 83735 ASSAY OF MAGNESIUM: CPT | Performed by: EMERGENCY MEDICINE

## 2025-04-08 PROCEDURE — 96374 THER/PROPH/DIAG INJ IV PUSH: CPT

## 2025-04-08 PROCEDURE — 36415 COLL VENOUS BLD VENIPUNCTURE: CPT | Performed by: EMERGENCY MEDICINE

## 2025-04-08 PROCEDURE — 84484 ASSAY OF TROPONIN QUANT: CPT | Performed by: EMERGENCY MEDICINE

## 2025-04-08 PROCEDURE — 96376 TX/PRO/DX INJ SAME DRUG ADON: CPT

## 2025-04-08 PROCEDURE — 80053 COMPREHEN METABOLIC PANEL: CPT | Performed by: EMERGENCY MEDICINE

## 2025-04-08 PROCEDURE — 99285 EMERGENCY DEPT VISIT HI MDM: CPT | Performed by: EMERGENCY MEDICINE

## 2025-04-08 PROCEDURE — 99284 EMERGENCY DEPT VISIT MOD MDM: CPT

## 2025-04-08 PROCEDURE — 96361 HYDRATE IV INFUSION ADD-ON: CPT

## 2025-04-08 PROCEDURE — 85025 COMPLETE CBC W/AUTO DIFF WBC: CPT | Performed by: EMERGENCY MEDICINE

## 2025-04-08 PROCEDURE — 70498 CT ANGIOGRAPHY NECK: CPT

## 2025-04-08 RX ORDER — BUTALBITAL, ACETAMINOPHEN AND CAFFEINE 50; 325; 40 MG/1; MG/1; MG/1
1 TABLET ORAL ONCE
Status: COMPLETED | OUTPATIENT
Start: 2025-04-08 | End: 2025-04-08

## 2025-04-08 RX ORDER — MECLIZINE HYDROCHLORIDE 25 MG/1
25 TABLET ORAL ONCE
Status: COMPLETED | OUTPATIENT
Start: 2025-04-08 | End: 2025-04-08

## 2025-04-08 RX ORDER — DIAZEPAM 10 MG/2ML
2.5 INJECTION, SOLUTION INTRAMUSCULAR; INTRAVENOUS ONCE
Status: COMPLETED | OUTPATIENT
Start: 2025-04-08 | End: 2025-04-08

## 2025-04-08 RX ORDER — ONDANSETRON 2 MG/ML
4 INJECTION INTRAMUSCULAR; INTRAVENOUS ONCE
Status: COMPLETED | OUTPATIENT
Start: 2025-04-08 | End: 2025-04-08

## 2025-04-08 RX ORDER — MECLIZINE HYDROCHLORIDE 25 MG/1
25 TABLET ORAL 3 TIMES DAILY PRN
Qty: 30 TABLET | Refills: 0 | Status: SHIPPED | OUTPATIENT
Start: 2025-04-08

## 2025-04-08 RX ORDER — ONDANSETRON 4 MG/1
4 TABLET, ORALLY DISINTEGRATING ORAL EVERY 8 HOURS PRN
Qty: 12 TABLET | Refills: 0 | Status: SHIPPED | OUTPATIENT
Start: 2025-04-08 | End: 2025-05-08

## 2025-04-08 RX ORDER — DIAZEPAM 5 MG/1
5 TABLET ORAL 2 TIMES DAILY
Qty: 12 TABLET | Refills: 0 | Status: SHIPPED | OUTPATIENT
Start: 2025-04-08 | End: 2025-04-14

## 2025-04-08 RX ADMIN — BUTALBITAL, ACETAMINOPHEN AND CAFFEINE 1 TABLET: 325; 50; 40 TABLET ORAL at 14:18

## 2025-04-08 RX ADMIN — MECLIZINE HYDROCHLORIDE 25 MG: 25 TABLET ORAL at 13:51

## 2025-04-08 RX ADMIN — DIAZEPAM 2.5 MG: 10 INJECTION, SOLUTION INTRAMUSCULAR; INTRAVENOUS at 14:19

## 2025-04-08 RX ADMIN — SODIUM CHLORIDE 1000 ML: 0.9 INJECTION, SOLUTION INTRAVENOUS at 11:35

## 2025-04-08 RX ADMIN — ONDANSETRON 4 MG: 2 INJECTION INTRAMUSCULAR; INTRAVENOUS at 11:38

## 2025-04-08 RX ADMIN — DIAZEPAM 2.5 MG: 10 INJECTION, SOLUTION INTRAMUSCULAR; INTRAVENOUS at 11:38

## 2025-04-08 RX ADMIN — IOHEXOL 85 ML: 350 INJECTION, SOLUTION INTRAVENOUS at 12:45
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
## 2025-04-14 ENCOUNTER — OFFICE VISIT (OUTPATIENT)
Dept: PAIN MEDICINE | Facility: CLINIC | Age: 65
End: 2025-04-14
Payer: COMMERCIAL

## 2025-04-14 VITALS
HEART RATE: 76 BPM | HEIGHT: 65 IN | SYSTOLIC BLOOD PRESSURE: 105 MMHG | DIASTOLIC BLOOD PRESSURE: 73 MMHG | BODY MASS INDEX: 25.83 KG/M2 | WEIGHT: 155 LBS

## 2025-04-14 DIAGNOSIS — M51.16 INTERVERTEBRAL DISC DISORDER WITH RADICULOPATHY OF LUMBAR REGION: Primary | ICD-10-CM

## 2025-04-14 DIAGNOSIS — M48.062 SPINAL STENOSIS OF LUMBAR REGION WITH NEUROGENIC CLAUDICATION: ICD-10-CM

## 2025-04-14 PROCEDURE — 99214 OFFICE O/P EST MOD 30 MIN: CPT | Performed by: ANESTHESIOLOGY
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
## 2025-04-15 ENCOUNTER — HOSPITAL ENCOUNTER (OUTPATIENT)
Dept: RADIOLOGY | Facility: HOSPITAL | Age: 65
Discharge: HOME | End: 2025-04-15
Payer: COMMERCIAL

## 2025-04-15 DIAGNOSIS — M51.16 INTERVERTEBRAL DISC DISORDER WITH RADICULOPATHY OF LUMBAR REGION: ICD-10-CM

## 2025-04-15 PROCEDURE — 73521 X-RAY EXAM HIPS BI 2 VIEWS: CPT

## 2025-04-16 ENCOUNTER — HOSPITAL ENCOUNTER (OUTPATIENT)
Dept: MRI IMAGING | Facility: CLINIC | Age: 65
Discharge: HOME | End: 2025-04-16
Attending: ANESTHESIOLOGY
Payer: COMMERCIAL

## 2025-04-16 DIAGNOSIS — M51.16 INTERVERTEBRAL DISC DISORDER WITH RADICULOPATHY OF LUMBAR REGION: ICD-10-CM

## 2025-04-16 PROCEDURE — 72148 MRI LUMBAR SPINE W/O DYE: CPT

## 2025-04-21 ENCOUNTER — TELEPHONE (OUTPATIENT)
Dept: PAIN MEDICINE | Facility: CLINIC | Age: 65
End: 2025-04-21

## 2025-04-21 NOTE — TELEPHONE ENCOUNTER
Pt informed of MRI results per task and plan is to proceed with scheduled Rt L4-L5 TFESI on 5/6.   Pt asking if it is possible for Mushtaq to move her 5/6 inj from the afternoon to the morning? Told pt will send message to Mushtaq to call her.     Pt said she read in her MRI about a bulge at T10. She does have some thoracic symptoms. Pt said she will discuss with FQ at her upcoming appt for the injection.

## 2025-04-21 NOTE — TELEPHONE ENCOUNTER
Please let patient know that I reviewed the updated MRI images and her disc bulging at L4-5 and L5-S1 has gotten slightly worse with foraminal narrowing more prominent L5-S1.  Continue with plan for right L4-5 TFESI as scheduled.  Her hip xrays were essentially normal

## 2025-04-22 DIAGNOSIS — I10 HYPERTENSION, ESSENTIAL: ICD-10-CM

## 2025-04-23 RX ORDER — LOSARTAN POTASSIUM 50 MG/1
50 TABLET ORAL DAILY
Qty: 90 TABLET | Refills: 1 | Status: SHIPPED | OUTPATIENT
Start: 2025-04-23

## 2025-04-30 ENCOUNTER — TELEPHONE (OUTPATIENT)
Age: 65
End: 2025-04-30

## 2025-04-30 NOTE — TELEPHONE ENCOUNTER
Pt called, will be moving down to [place] and would like us to fax down her orders for her mammo diagnostic and US breast as well. Fax #[phone] to the attn of Melissa. If that does not work, pt said please give her a call back and she will try to get another fax number. Please advise.

## 2025-05-06 ENCOUNTER — HOSPITAL ENCOUNTER (OUTPATIENT)
Dept: RADIOLOGY | Facility: CLINIC | Age: 65
Discharge: HOME | End: 2025-05-06
Attending: ANESTHESIOLOGY
Payer: COMMERCIAL

## 2025-05-06 VITALS
OXYGEN SATURATION: 97 % | RESPIRATION RATE: 18 BRPM | SYSTOLIC BLOOD PRESSURE: 108 MMHG | HEART RATE: 69 BPM | DIASTOLIC BLOOD PRESSURE: 71 MMHG | TEMPERATURE: 97.2 F

## 2025-05-06 DIAGNOSIS — M51.16 INTERVERTEBRAL DISC DISORDER WITH RADICULOPATHY OF LUMBAR REGION: ICD-10-CM

## 2025-05-06 PROCEDURE — 64483 NJX AA&/STRD TFRM EPI L/S 1: CPT | Performed by: ANESTHESIOLOGY

## 2025-05-06 PROCEDURE — 64484 NJX AA&/STRD TFRM EPI L/S EA: CPT | Performed by: ANESTHESIOLOGY

## 2025-05-06 RX ORDER — METHYLPREDNISOLONE ACETATE 80 MG/ML
80 INJECTION, SUSPENSION INTRA-ARTICULAR; INTRALESIONAL; INTRAMUSCULAR; PARENTERAL; SOFT TISSUE ONCE
Status: COMPLETED | OUTPATIENT
Start: 2025-05-06 | End: 2025-05-06

## 2025-05-06 RX ORDER — BUPIVACAINE HCL/PF 2.5 MG/ML
2 VIAL (ML) INJECTION ONCE
Status: COMPLETED | OUTPATIENT
Start: 2025-05-06 | End: 2025-05-06

## 2025-05-06 RX ORDER — 0.9 % SODIUM CHLORIDE 0.9 %
4 VIAL (ML) INJECTION ONCE
Status: COMPLETED | OUTPATIENT
Start: 2025-05-06 | End: 2025-05-06

## 2025-05-06 RX ADMIN — METHYLPREDNISOLONE ACETATE 80 MG: 80 INJECTION, SUSPENSION INTRA-ARTICULAR; INTRALESIONAL; INTRAMUSCULAR; SOFT TISSUE at 10:24

## 2025-05-06 RX ADMIN — LIDOCAINE HYDROCHLORIDE 4 ML: 20 INJECTION, SOLUTION EPIDURAL; INFILTRATION; INTRACAUDAL at 10:21

## 2025-05-06 RX ADMIN — BUPIVACAINE HYDROCHLORIDE 2 ML: 2.5 INJECTION, SOLUTION EPIDURAL; INFILTRATION; INTRACAUDAL at 10:24

## 2025-05-06 RX ADMIN — Medication 4 ML: at 10:21

## 2025-05-06 RX ADMIN — IOHEXOL 1 ML: 300 INJECTION, SOLUTION INTRAVENOUS at 10:24

## 2025-05-06 NOTE — DISCHARGE INSTR - LAB
Epidural Steroid Injection   WHAT YOU NEED TO KNOW:   An epidural steroid injection (DINORAH) is a procedure to inject steroid medicine into the epidural space. The epidural space is between your spinal cord and vertebrae. Steroids reduce inflammation and fluid buildup in your spine that may be causing pain. You may be given pain medicine along with the steroids.          ACTIVITY  Do not drive or operate machinery today.  No strenuous activity today - bending, lifting, etc.  You may resume normal activites starting tomorrow - start slowly and as tolerated.  You may shower today, but no tub baths or hot tubs.  You may have numbness for several hours from the local anesthetic. Please use caution and common sense, especially with weight-bearing activities.    CARE OF THE INJECTION SITE  If you have soreness or pain, apply ice to the area today (20 minutes on/20 minutes off).  Starting tomorrow, you may use warm, moist heat or ice if needed.  You may have an increase or change in your discomfort for 36-48 hours after your treatment.  Apply ice and continue with any pain medication you have been prescribed.  Notify the [facility] if you have any of the following: redness, drainage, swelling, headache, stiff neck or fever above 100°F.    SPECIAL INSTRUCTIONS  Our office will contact you in approximately 14 days for a progress report.    MEDICATIONS  Continue to take all routine medications.  Our office may have instructed you to hold some medications.    As no general anesthesia was used in today's procedure, you should not experience any side effects related to anesthesia.     If you are diabetic, the steroids used in today's injection may temporarily increase your blood sugar levels after the first few days after your injection. Please keep a close eye on your sugars and alert the doctor who manages your diabetes if your sugars are significantly high from your baseline or you are symptomatic.     If you have a  problem specifically related to your procedure, please call our office at [phone].  Problems not related to your procedure should be directed to your primary care physician.
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
## 2025-05-07 ENCOUNTER — CLINICAL SUPPORT (OUTPATIENT)
Dept: BARIATRICS | Facility: CLINIC | Age: 65
End: 2025-05-07

## 2025-05-07 DIAGNOSIS — R63.5 ABNORMAL WEIGHT GAIN: Primary | ICD-10-CM

## 2025-05-07 PROCEDURE — WMDI30

## 2025-05-07 PROCEDURE — RECHECK

## 2025-05-07 NOTE — PROGRESS NOTES
Weight Management Medical Nutrition Assessment  Odalys was here today for meal planning. Today she weighs 155.8 lbs.  She has not been seen for awhile, and wanted to come in for accountability and to be able to purchase products  Reviewed her calorie carb and protein needs.  Provided her with a sample meal plan as well.  She also placed an order for our products.     Patient seen by Medical Provider in past 6 months:  yes  Requested to schedule appointment with Medical Provider: No        Anthropometric Measurements  Start Weight (#):181.6  (8/22/22)  Current Weight (#): 155.8  TBW % Change from start weight:14%  Ideal Body Weight (#): 130   Goal Weight (#):155 - 160  Highest:181  Lowest:  148 - 150     Weight Loss History  Previous weight loss attempts: keto     Food and Nutrition Related History  Wake up:  9:30              Bed Time:4:45 am     Food Recall  Breakfast: 1/2 english muffin, banana or bar or shake            Snack:not usually  Lunch:salad  Snack:apple  Dinner: lean meat, veg, starch  (not always a starch)  Snack: not now         Beverages: water  Volume of beverage intake: 60 - 64  sometimes more     Weekends: Same  Cravings: salty  Trouble area of day: night time     Frequency of Eating out: 3 times per week  Food restrictions:none  Cooking: self   Food Shopping: self     Physical Activity Intake  Activity:Swimming, walking and weights  Frequency:1/2 hour 3 days per week swimming  Physical limitations/barriers to exercise: spinal stenosis     Estimated Needs  Energy     Gianfranco Stringer Energy Needs: BMR : 1253     .5-1# loss weekly sedentary:  1003 - 1253         1# loss weekly lightly active:1222  Maintenance calories for sedentary activity level: 1003  Protein:71 - 89      (1.2-1.5g/kg IBW)  Fluid Requirement Calculator   Total Fluid: 84.99   oz  (Meg-Segar Method)  Free Fluid: 68 oz (Meg-Segar Method - 20%)    Nutrition Diagnosis     Nutrition Diagnosis  Yes;    Overweight/obesity   related to Excess energy intake as evidenced by  BMI more than normative standard for age and sex (overweight 25-29.9)     Nutrition Intervention     Nutrition Prescription  Calories:1200   Protein: 71 - 89  Fluid:68      Nutrition Education:    Calorie controlled menu  Lean protein food choices  Healthy snack options  Food journaling tips        Nutrition Counseling:  Strategies: meal planning, portion sizes, healthy snack choices, hydration, fiber intake, protein intake, exercise, food journal        Monitoring and Evaluation:  Evaluation criteria:  Energy Intake  Meet protein needs  Maintain adequate hydration  Monitor weekly weight  Meal planning/preparation  Food journal   Decreased portions at mealtimes and snacks  Physical activity      Barriers to learning:none  Readiness to change: Action:  (Changing behavior)  Comprehension: good  Expected Compliance: good

## 2025-05-19 ENCOUNTER — TELEPHONE (OUTPATIENT)
Dept: PAIN MEDICINE | Facility: CLINIC | Age: 65
End: 2025-05-19

## 2025-05-23 ENCOUNTER — APPOINTMENT (OUTPATIENT)
Dept: LAB | Facility: CLINIC | Age: 65
End: 2025-05-23
Payer: COMMERCIAL

## 2025-05-23 ENCOUNTER — OFFICE VISIT (OUTPATIENT)
Age: 65
End: 2025-05-23
Payer: COMMERCIAL

## 2025-05-23 VITALS
HEIGHT: 65 IN | SYSTOLIC BLOOD PRESSURE: 124 MMHG | TEMPERATURE: 97.9 F | WEIGHT: 155 LBS | OXYGEN SATURATION: 100 % | DIASTOLIC BLOOD PRESSURE: 70 MMHG | HEART RATE: 71 BPM | BODY MASS INDEX: 25.83 KG/M2

## 2025-05-23 DIAGNOSIS — J02.9 SORE THROAT: ICD-10-CM

## 2025-05-23 DIAGNOSIS — E78.2 MIXED HYPERLIPIDEMIA: ICD-10-CM

## 2025-05-23 DIAGNOSIS — J02.9 SORE THROAT: Primary | ICD-10-CM

## 2025-05-23 DIAGNOSIS — I10 HYPERTENSION, ESSENTIAL: ICD-10-CM

## 2025-05-23 DIAGNOSIS — J02.0 STREP PHARYNGITIS: Primary | ICD-10-CM

## 2025-05-23 LAB
BASOPHILS # BLD AUTO: 0.03 THOUSANDS/ÂΜL (ref 0–0.1)
BASOPHILS NFR BLD AUTO: 0 % (ref 0–1)
CRP SERPL QL: 14.7 MG/L
EOSINOPHIL # BLD AUTO: 0.26 THOUSAND/ÂΜL (ref 0–0.61)
EOSINOPHIL NFR BLD AUTO: 3 % (ref 0–6)
ERYTHROCYTE [DISTWIDTH] IN BLOOD BY AUTOMATED COUNT: 11.9 % (ref 11.6–15.1)
ERYTHROCYTE [SEDIMENTATION RATE] IN BLOOD: 17 MM/HOUR (ref 0–29)
HCT VFR BLD AUTO: 45.4 % (ref 34.8–46.1)
HGB BLD-MCNC: 14.4 G/DL (ref 11.5–15.4)
IMM GRANULOCYTES # BLD AUTO: 0.04 THOUSAND/UL (ref 0–0.2)
IMM GRANULOCYTES NFR BLD AUTO: 0 % (ref 0–2)
LYMPHOCYTES # BLD AUTO: 2.28 THOUSANDS/ÂΜL (ref 0.6–4.47)
LYMPHOCYTES NFR BLD AUTO: 22 % (ref 14–44)
MCH RBC QN AUTO: 29.9 PG (ref 26.8–34.3)
MCHC RBC AUTO-ENTMCNC: 31.7 G/DL (ref 31.4–37.4)
MCV RBC AUTO: 94 FL (ref 82–98)
MONOCYTES # BLD AUTO: 1.04 THOUSAND/ÂΜL (ref 0.17–1.22)
MONOCYTES NFR BLD AUTO: 10 % (ref 4–12)
NEUTROPHILS # BLD AUTO: 6.78 THOUSANDS/ÂΜL (ref 1.85–7.62)
NEUTS SEG NFR BLD AUTO: 65 % (ref 43–75)
NRBC BLD AUTO-RTO: 0 /100 WBCS
PLATELET # BLD AUTO: 243 THOUSANDS/UL (ref 149–390)
PMV BLD AUTO: 10.4 FL (ref 8.9–12.7)
RBC # BLD AUTO: 4.81 MILLION/UL (ref 3.81–5.12)
S PYO AG THROAT QL: NEGATIVE
SARS-COV-2 AG UPPER RESP QL IA: NEGATIVE
VALID CONTROL: NORMAL
WBC # BLD AUTO: 10.43 THOUSAND/UL (ref 4.31–10.16)

## 2025-05-23 PROCEDURE — 99214 OFFICE O/P EST MOD 30 MIN: CPT

## 2025-05-23 PROCEDURE — 87811 SARS-COV-2 COVID19 W/OPTIC: CPT

## 2025-05-23 PROCEDURE — 87880 STREP A ASSAY W/OPTIC: CPT

## 2025-05-23 PROCEDURE — 85652 RBC SED RATE AUTOMATED: CPT

## 2025-05-23 PROCEDURE — 36415 COLL VENOUS BLD VENIPUNCTURE: CPT

## 2025-05-23 PROCEDURE — 86140 C-REACTIVE PROTEIN: CPT

## 2025-05-23 PROCEDURE — 85025 COMPLETE CBC W/AUTO DIFF WBC: CPT

## 2025-05-23 RX ORDER — AMOXICILLIN 875 MG/1
875 TABLET, COATED ORAL 2 TIMES DAILY
Qty: 20 TABLET | Refills: 0 | Status: SHIPPED | OUTPATIENT
Start: 2025-05-23 | End: 2025-06-02

## 2025-05-23 RX ORDER — ATORVASTATIN CALCIUM 20 MG/1
20 TABLET, FILM COATED ORAL
Qty: 90 TABLET | Refills: 0 | Status: SHIPPED | OUTPATIENT
Start: 2025-05-23

## 2025-05-23 RX ORDER — DIPHENHYDRAMINE HYDROCHLORIDE AND LIDOCAINE HYDROCHLORIDE AND ALUMINUM HYDROXIDE AND MAGNESIUM HYDRO
10 KIT EVERY 4 HOURS PRN
Qty: 237 ML | Refills: 1 | Status: SHIPPED | OUTPATIENT
Start: 2025-05-23

## 2025-05-23 RX ORDER — LOSARTAN POTASSIUM 50 MG/1
50 TABLET ORAL DAILY
Qty: 90 TABLET | Refills: 0 | Status: SHIPPED | OUTPATIENT
Start: 2025-05-23

## 2025-05-23 NOTE — PROGRESS NOTES
"Name: Odalys Salinas      : 1960      MRN: [id number]  Encounter Provider: Lynda Cade PA-C  Encounter Date: 2025   Encounter department: [facility] [address]  :  Assessment & Plan  Sore throat  Rapid COVID and strep negative in office.  Similar symptoms to her .  Unclear etiology.  No fever, no palpable lymphadenopathy, no pharyngeal erythema/exudate.  Possible etiologies-viral illness, mono, allergies, GERD, anxiety, thyroid condition.  Discussed workup that could be pursued but patient would like to wait with blood work first before considering any neck steps.  Orders:    POCT rapid ANTIGEN strepA    POCT Rapid Covid Ag    CBC and differential; Future    Sedimentation rate, automated    C-reactive protein           History of Present Illness   Patient is a 64 year old female presenting for acute visit  -endorses similar symptoms as   -was recently on a trip to [facility]--had symptoms on the trip  -symptoms have started to get worse since yesterday  -still has sore throat, ear pain, difficulty swallowing, nonproductive cough-feels like she needs to clear throat a lot  -denies SOB, difficulty breathing, CP  -pain with swallowing so hasn't eaten much or drank much  -knife like pain with swallowing  -feels like pill gets stuck when swallowing      Review of Systems   Constitutional:  Negative for fatigue and fever.   HENT:  Positive for postnasal drip, sore throat and trouble swallowing. Negative for congestion, ear pain, rhinorrhea and sinus pain.    Respiratory:  Positive for cough. Negative for chest tightness, shortness of breath and wheezing.    Cardiovascular:  Negative for chest pain and palpitations.   Gastrointestinal:  Negative for diarrhea, nausea and vomiting.   Musculoskeletal:  Negative for myalgias.   Neurological:  Negative for headaches.       Objective   /70   Pulse 71   Temp 97.9 °F (36.6 °C)   Ht 5' 5\" (1.651 m)   Wt 70.3 kg " (155 lb)   LMP 01/01/2015 (Within Years)   SpO2 100%   BMI 25.79 kg/m²      Physical Exam  Vitals and nursing note reviewed.   Constitutional:       General: She is awake. She is not in acute distress.     Appearance: Normal appearance. She is well-developed, well-groomed and normal weight.   HENT:      Head: Normocephalic and atraumatic.      Right Ear: Hearing and external ear normal.      Left Ear: Hearing and external ear normal.      Nose: Nose normal.      Mouth/Throat:      Lips: Pink.      Mouth: Mucous membranes are moist. No oral lesions.      Pharynx: Uvula midline. No pharyngeal swelling, oropharyngeal exudate or posterior oropharyngeal erythema.      Tonsils: No tonsillar exudate. 0 on the right. 0 on the left.     Eyes:      General: Lids are normal. Vision grossly intact. Gaze aligned appropriately.      Conjunctiva/sclera: Conjunctivae normal.     Neck:      Thyroid: No thyromegaly.      Vascular: No carotid bruit.      Trachea: Trachea and phonation normal.     Cardiovascular:      Rate and Rhythm: Normal rate and regular rhythm.      Heart sounds: Normal heart sounds, S1 normal and S2 normal. No murmur heard.     No friction rub. No gallop.   Pulmonary:      Effort: Pulmonary effort is normal. No respiratory distress.      Breath sounds: Normal breath sounds and air entry. No decreased breath sounds, wheezing, rhonchi or rales.   Abdominal:      General: Abdomen is flat.     Musculoskeletal:         General: No swelling.      Cervical back: Neck supple.      Right lower leg: No edema.      Left lower leg: No edema.   Lymphadenopathy:      Cervical: No cervical adenopathy.     Skin:     General: Skin is warm.      Capillary Refill: Capillary refill takes less than 2 seconds.     Neurological:      Mental Status: She is alert.     Psychiatric:         Attention and Perception: Attention and perception normal.         Mood and Affect: Mood and affect normal.         Speech: Speech normal.          Behavior: Behavior normal. Behavior is cooperative.         Thought Content: Thought content normal.         Cognition and Memory: Cognition and memory normal.         Judgment: Judgment normal.

## 2025-05-27 ENCOUNTER — RESULTS FOLLOW-UP (OUTPATIENT)
Age: 65
End: 2025-05-27

## 2025-07-15 ENCOUNTER — TELEPHONE (OUTPATIENT)
Age: 65
End: 2025-07-15